# Patient Record
Sex: FEMALE | Race: WHITE | NOT HISPANIC OR LATINO | Employment: UNEMPLOYED | URBAN - METROPOLITAN AREA
[De-identification: names, ages, dates, MRNs, and addresses within clinical notes are randomized per-mention and may not be internally consistent; named-entity substitution may affect disease eponyms.]

---

## 2017-08-24 ENCOUNTER — HISTORICAL (OUTPATIENT)
Dept: INTERNAL MEDICINE | Facility: CLINIC | Age: 37
End: 2017-08-24

## 2017-08-24 LAB
ABS NEUT (OLG): 6.94 X10(3)/MCL (ref 2.1–9.2)
ALBUMIN SERPL-MCNC: 3.4 GM/DL (ref 3.4–5)
ALBUMIN/GLOB SERPL: 1 RATIO (ref 1–2)
ALP SERPL-CCNC: 89 UNIT/L (ref 45–117)
ALT SERPL-CCNC: 25 UNIT/L (ref 12–78)
AST SERPL-CCNC: 13 UNIT/L (ref 15–37)
BASOPHILS # BLD AUTO: 0.03 X10(3)/MCL
BASOPHILS NFR BLD AUTO: 0 % (ref 0–1)
BILIRUB SERPL-MCNC: 0.3 MG/DL (ref 0.2–1)
BILIRUBIN DIRECT+TOT PNL SERPL-MCNC: <0.1 MG/DL
BILIRUBIN DIRECT+TOT PNL SERPL-MCNC: >0.2 MG/DL
BUN SERPL-MCNC: 11 MG/DL (ref 7–18)
CALCIUM SERPL-MCNC: 9.4 MG/DL (ref 8.5–10.1)
CHLORIDE SERPL-SCNC: 103 MMOL/L (ref 98–107)
CHOLEST SERPL-MCNC: 172 MG/DL
CHOLEST/HDLC SERPL: 3.4 {RATIO} (ref 0–4.4)
CO2 SERPL-SCNC: 30 MMOL/L (ref 21–32)
CREAT SERPL-MCNC: 0.8 MG/DL (ref 0.6–1.3)
EOSINOPHIL # BLD AUTO: 0.83 X10(3)/MCL
EOSINOPHIL NFR BLD AUTO: 8 % (ref 0–5)
ERYTHROCYTE [DISTWIDTH] IN BLOOD BY AUTOMATED COUNT: 14.6 % (ref 11.5–14.5)
EST. AVERAGE GLUCOSE BLD GHB EST-MCNC: 111 MG/DL
GLOBULIN SER-MCNC: 4.1 GM/ML (ref 2.3–3.5)
GLUCOSE SERPL-MCNC: 102 MG/DL (ref 74–106)
HBA1C MFR BLD: 5.5 % (ref 4.2–6.3)
HCT VFR BLD AUTO: 40 % (ref 35–46)
HDLC SERPL-MCNC: 50 MG/DL
HGB BLD-MCNC: 12.8 GM/DL (ref 12–16)
IMM GRANULOCYTES # BLD AUTO: 0.02 10*3/UL
IMM GRANULOCYTES NFR BLD AUTO: 0 %
LDLC SERPL CALC-MCNC: 107 MG/DL (ref 0–130)
LYMPHOCYTES # BLD AUTO: 2.51 X10(3)/MCL
LYMPHOCYTES NFR BLD AUTO: 24 % (ref 15–40)
MCH RBC QN AUTO: 23.9 PG (ref 26–34)
MCHC RBC AUTO-ENTMCNC: 32 GM/DL (ref 31–37)
MCV RBC AUTO: 74.8 FL (ref 80–100)
MONOCYTES # BLD AUTO: 0.35 X10(3)/MCL
MONOCYTES NFR BLD AUTO: 3 % (ref 4–12)
NEUTROPHILS # BLD AUTO: 6.94 X10(3)/MCL
NEUTROPHILS NFR BLD AUTO: 65 X10(3)/MCL
PLATELET # BLD AUTO: 303 X10(3)/MCL (ref 130–400)
PMV BLD AUTO: 8.9 FL (ref 7.4–10.4)
POTASSIUM SERPL-SCNC: 3.7 MMOL/L (ref 3.5–5.1)
PROT SERPL-MCNC: 7.5 GM/DL (ref 6.4–8.2)
RBC # BLD AUTO: 5.35 X10(6)/MCL (ref 4–5.2)
SODIUM SERPL-SCNC: 142 MMOL/L (ref 136–145)
T4 FREE SERPL-MCNC: 1.25 NG/DL (ref 0.76–1.46)
TRIGL SERPL-MCNC: 74 MG/DL
TSH SERPL-ACNC: 6.06 MIU/L (ref 0.36–3.74)
VLDLC SERPL CALC-MCNC: 15 MG/DL
WBC # SPEC AUTO: 10.7 X10(3)/MCL (ref 4.5–11)

## 2017-11-17 ENCOUNTER — HISTORICAL (OUTPATIENT)
Dept: LAB | Facility: HOSPITAL | Age: 37
End: 2017-11-17

## 2017-11-17 LAB
ABS NEUT (OLG): 10.22
ALBUMIN SERPL-MCNC: 3.7 GM/DL (ref 3.4–5)
ALBUMIN/GLOB SERPL: 0.9 RATIO (ref 1.1–2)
ALP SERPL-CCNC: 88 UNIT/L (ref 46–116)
ALT SERPL-CCNC: 35 UNIT/L (ref 12–78)
AST SERPL-CCNC: 23 UNIT/L (ref 10–37)
BASOPHILS # BLD AUTO: 0.05 X10(3)/MCL
BASOPHILS NFR BLD AUTO: 0.3 %
BILIRUB SERPL-MCNC: 0.4 MG/DL (ref 0.2–1)
BILIRUBIN DIRECT+TOT PNL SERPL-MCNC: 0.1 MG/DL (ref 0–0.2)
BILIRUBIN DIRECT+TOT PNL SERPL-MCNC: 0.28 MG/DL
BUN SERPL-MCNC: 10 MG/DL (ref 7–18)
CALCIUM SERPL-MCNC: 9.3 MG/DL (ref 8.5–10.1)
CHLORIDE SERPL-SCNC: 102 MMOL/L (ref 98–107)
CHOLEST SERPL-MCNC: 189 MG/DL (ref 50–200)
CHOLEST/HDLC SERPL: 3 {RATIO} (ref 0–5)
CO2 SERPL-SCNC: 30.9 MMOL/L (ref 21–32)
CREAT SERPL-MCNC: 0.76 MG/DL (ref 0.55–1.02)
EOSINOPHIL # BLD AUTO: 1.58 X10(3)/MCL
EOSINOPHIL NFR BLD AUTO: 10.3 %
ERYTHROCYTE [DISTWIDTH] IN BLOOD BY AUTOMATED COUNT: 16 %
EST. AVERAGE GLUCOSE BLD GHB EST-MCNC: 111 MG/DL
GLOBULIN SER-MCNC: 4.1 GM/DL (ref 2.4–3.5)
GLUCOSE SERPL-MCNC: 99 MG/DL (ref 74–106)
HBA1C MFR BLD: 5.5 % (ref 4.5–6.2)
HCT VFR BLD AUTO: 43.8 % (ref 34–46)
HDLC SERPL-MCNC: 59 MG/DL (ref 35–60)
HGB BLD-MCNC: 14.1 GM/DL (ref 11.3–15.4)
IMM GRANULOCYTES # BLD AUTO: 0.04 10*3/UL (ref 0–0.1)
IMM GRANULOCYTES NFR BLD AUTO: 0.3 % (ref 0–1)
LDLC SERPL CALC-MCNC: 111 MG/DL (ref 50–140)
LYMPHOCYTES # BLD AUTO: 2.9 X10(3)/MCL
LYMPHOCYTES NFR BLD AUTO: 18.9 %
MCH RBC QN AUTO: 24.6 PG (ref 27–33)
MCHC RBC AUTO-ENTMCNC: 32.2 GM/DL (ref 32–35)
MCV RBC AUTO: 76.3 FL (ref 81–97)
MONOCYTES # BLD AUTO: 0.52 X10(3)/MCL
MONOCYTES NFR BLD AUTO: 3.4 %
NEUTROPHILS # BLD AUTO: 10.22 X10(3)/MCL
NEUTROPHILS NFR BLD AUTO: 66.8 %
PLATELET # BLD AUTO: 289 X10(3)/MCL (ref 151–368)
PMV BLD AUTO: 9 FL
POTASSIUM SERPL-SCNC: 4.4 MMOL/L (ref 3.5–5.1)
PROT SERPL-MCNC: 7.8 GM/DL (ref 6.4–8.2)
RBC # BLD AUTO: 5.74 X10(6)/MCL (ref 3.9–5)
SODIUM SERPL-SCNC: 140 MMOL/L (ref 136–145)
TRIGL SERPL-MCNC: 95 MG/DL (ref 30–150)
TSH SERPL-ACNC: 2.96 MIU/ML (ref 0.35–3.75)
VLDLC SERPL CALC-MCNC: 19 MG/DL
WBC # SPEC AUTO: 15.31 X10(3)/MCL (ref 3.4–9.2)

## 2017-12-27 ENCOUNTER — HISTORICAL (OUTPATIENT)
Dept: RADIOLOGY | Facility: HOSPITAL | Age: 37
End: 2017-12-27

## 2018-02-23 ENCOUNTER — HISTORICAL (OUTPATIENT)
Dept: ADMINISTRATIVE | Facility: HOSPITAL | Age: 38
End: 2018-02-23

## 2018-02-23 LAB
BUN SERPL-MCNC: 8 MG/DL (ref 7–18)
CALCIUM SERPL-MCNC: 9.5 MG/DL (ref 8.5–10.1)
CHLORIDE SERPL-SCNC: 103 MMOL/L (ref 98–107)
CO2 SERPL-SCNC: 27 MMOL/L (ref 21–32)
CREAT SERPL-MCNC: 0.7 MG/DL (ref 0.6–1.3)
ERYTHROCYTE [DISTWIDTH] IN BLOOD BY AUTOMATED COUNT: 14.6 % (ref 11.5–14.5)
GLUCOSE SERPL-MCNC: 156 MG/DL (ref 74–106)
HCT VFR BLD AUTO: 40.3 % (ref 35–46)
HGB BLD-MCNC: 13 GM/DL (ref 12–16)
MCH RBC QN AUTO: 25.3 PG (ref 26–34)
MCHC RBC AUTO-ENTMCNC: 32.3 GM/DL (ref 31–37)
MCV RBC AUTO: 78.4 FL (ref 80–100)
PLATELET # BLD AUTO: 334 X10(3)/MCL (ref 130–400)
PMV BLD AUTO: 9.6 FL (ref 7.4–10.4)
POTASSIUM SERPL-SCNC: 3.6 MMOL/L (ref 3.5–5.1)
RBC # BLD AUTO: 5.14 X10(6)/MCL (ref 4–5.2)
SODIUM SERPL-SCNC: 138 MMOL/L (ref 136–145)
WBC # SPEC AUTO: 16 X10(3)/MCL (ref 4.5–11)

## 2018-02-26 ENCOUNTER — HISTORICAL (OUTPATIENT)
Dept: SURGERY | Facility: HOSPITAL | Age: 38
End: 2018-02-26

## 2018-02-26 LAB
B-HCG SERPL QL: NEGATIVE
POTASSIUM SERPL-SCNC: 3.7 MMOL/L (ref 3.5–5.1)

## 2019-06-05 ENCOUNTER — HISTORICAL (OUTPATIENT)
Dept: LAB | Facility: HOSPITAL | Age: 39
End: 2019-06-05

## 2019-06-05 LAB
ABS NEUT (OLG): 7.25
ALBUMIN SERPL-MCNC: 3.2 GM/DL (ref 3.4–5)
ALBUMIN/GLOB SERPL: 0.8 RATIO (ref 1.1–2)
ALP SERPL-CCNC: 94 UNIT/L (ref 46–116)
ALT SERPL-CCNC: 28 UNIT/L (ref 12–78)
AST SERPL-CCNC: 14 UNIT/L (ref 10–37)
BASOPHILS # BLD AUTO: 0.03 X10(3)/MCL
BASOPHILS NFR BLD AUTO: 0.3 %
BILIRUB SERPL-MCNC: 0.2 MG/DL (ref 0.2–1)
BILIRUBIN DIRECT+TOT PNL SERPL-MCNC: 0.07 MG/DL (ref 0–0.2)
BILIRUBIN DIRECT+TOT PNL SERPL-MCNC: 0.13 MG/DL
BUN SERPL-MCNC: 9 MG/DL (ref 7–18)
CALCIUM SERPL-MCNC: 9.3 MG/DL (ref 8.5–10.1)
CHLORIDE SERPL-SCNC: 103 MMOL/L (ref 98–107)
CHOLEST SERPL-MCNC: 182 MG/DL (ref 50–200)
CHOLEST/HDLC SERPL: 4 {RATIO} (ref 0–5)
CO2 SERPL-SCNC: 32.7 MMOL/L (ref 21–32)
CREAT SERPL-MCNC: 0.86 MG/DL (ref 0.55–1.02)
EOSINOPHIL # BLD AUTO: 0.54 X10(3)/MCL
EOSINOPHIL NFR BLD AUTO: 5.1 %
ERYTHROCYTE [DISTWIDTH] IN BLOOD BY AUTOMATED COUNT: 15 %
EST. AVERAGE GLUCOSE BLD GHB EST-MCNC: 134 MG/DL
GLOBULIN SER-MCNC: 4.1 GM/DL (ref 2.4–3.5)
GLUCOSE SERPL-MCNC: 118 MG/DL (ref 74–106)
HBA1C MFR BLD: 6.3 % (ref 4.5–6.2)
HCT VFR BLD AUTO: 41.3 % (ref 34–46)
HDLC SERPL-MCNC: 43 MG/DL (ref 35–60)
HGB BLD-MCNC: 12.7 GM/DL (ref 11.3–15.4)
IMM GRANULOCYTES # BLD AUTO: 0.03 10*3/UL (ref 0–0.1)
IMM GRANULOCYTES NFR BLD AUTO: 0.3 % (ref 0–1)
LDLC SERPL CALC-MCNC: 123 MG/DL (ref 50–140)
LYMPHOCYTES # BLD AUTO: 2.33 X10(3)/MCL
LYMPHOCYTES NFR BLD AUTO: 21.8 %
MCH RBC QN AUTO: 23.9 PG (ref 27–33)
MCHC RBC AUTO-ENTMCNC: 30.8 GM/DL (ref 32–35)
MCV RBC AUTO: 77.8 FL (ref 81–97)
MONOCYTES # BLD AUTO: 0.51 X10(3)/MCL
MONOCYTES NFR BLD AUTO: 4.8 %
NEUTROPHILS # BLD AUTO: 7.25 X10(3)/MCL
NEUTROPHILS NFR BLD AUTO: 67.7 %
PLATELET # BLD AUTO: 312 X10(3)/MCL (ref 151–368)
PMV BLD AUTO: 8 FL
POTASSIUM SERPL-SCNC: 3.8 MMOL/L (ref 3.5–5.1)
PROT SERPL-MCNC: 7.3 GM/DL (ref 6.4–8.2)
RBC # BLD AUTO: 5.31 X10(6)/MCL (ref 3.9–5)
SODIUM SERPL-SCNC: 141 MMOL/L (ref 136–145)
TRIGL SERPL-MCNC: 81 MG/DL (ref 30–150)
TSH SERPL-ACNC: 2.85 MIU/ML (ref 0.35–3.75)
VLDLC SERPL CALC-MCNC: 16 MG/DL
WBC # SPEC AUTO: 10.69 X10(3)/MCL (ref 3.4–9.2)

## 2019-12-05 ENCOUNTER — HISTORICAL (OUTPATIENT)
Dept: LAB | Facility: HOSPITAL | Age: 39
End: 2019-12-05

## 2019-12-05 LAB
ABS NEUT (OLG): 9.21
ALBUMIN SERPL-MCNC: 3.5 GM/DL (ref 3.5–5.2)
ALBUMIN/GLOB SERPL: 0.9 RATIO (ref 1.1–2)
ALP SERPL-CCNC: 74 UNIT/L (ref 40–150)
ALT SERPL-CCNC: 31 UNIT/L (ref 0–55)
AST SERPL-CCNC: 28 UNIT/L (ref 5–34)
BASOPHILS # BLD AUTO: 0.02 X10(3)/MCL
BASOPHILS NFR BLD AUTO: 0.2 %
BILIRUB SERPL-MCNC: 0.3 MG/DL
BILIRUBIN DIRECT+TOT PNL SERPL-MCNC: 0.1 MG/DL (ref 0–0.5)
BILIRUBIN DIRECT+TOT PNL SERPL-MCNC: 0.2 MG/DL
BUN SERPL-MCNC: 7 MG/DL (ref 7–18.7)
CALCIUM SERPL-MCNC: 9.5 MG/DL (ref 8.4–10.2)
CHLORIDE SERPL-SCNC: 106 MMOL/L (ref 98–107)
CHOLEST SERPL-MCNC: 160 MG/DL
CHOLEST/HDLC SERPL: 5 {RATIO} (ref 0–5)
CO2 SERPL-SCNC: 27 MEQ/L (ref 22–29)
CREAT SERPL-MCNC: 0.69 MG/DL (ref 0.55–1.02)
EOSINOPHIL # BLD AUTO: 0.37 X10(3)/MCL
EOSINOPHIL NFR BLD AUTO: 2.9 %
ERYTHROCYTE [DISTWIDTH] IN BLOOD BY AUTOMATED COUNT: 16 %
EST. AVERAGE GLUCOSE BLD GHB EST-MCNC: 117 MG/DL
GLOBULIN SER-MCNC: 3.7 GM/DL (ref 2.4–3.5)
GLUCOSE SERPL-MCNC: 74 MG/DL (ref 74–100)
HBA1C MFR BLD: 5.7 % (ref 4–6)
HCT VFR BLD AUTO: 40.4 % (ref 34–46)
HDLC SERPL-MCNC: 35 MG/DL
HGB BLD-MCNC: 12.8 GM/DL (ref 11.3–15.4)
IMM GRANULOCYTES # BLD AUTO: 0.03 10*3/UL (ref 0–0.1)
IMM GRANULOCYTES NFR BLD AUTO: 0.2 % (ref 0–1)
LDLC SERPL CALC-MCNC: 87 MG/DL (ref 50–140)
LYMPHOCYTES # BLD AUTO: 2.36 X10(3)/MCL
LYMPHOCYTES NFR BLD AUTO: 18.6 %
MCH RBC QN AUTO: 25.1 PG (ref 27–33)
MCHC RBC AUTO-ENTMCNC: 31.7 GM/DL (ref 32–35)
MCV RBC AUTO: 79.2 FL (ref 81–97)
MONOCYTES # BLD AUTO: 0.68 X10(3)/MCL
MONOCYTES NFR BLD AUTO: 5.4 %
NEUTROPHILS # BLD AUTO: 9.21 X10(3)/MCL
NEUTROPHILS NFR BLD AUTO: 72.7 %
PLATELET # BLD AUTO: 304 X10(3)/MCL (ref 140–450)
PMV BLD AUTO: 9 FL
POTASSIUM SERPL-SCNC: 3.5 MMOL/L (ref 3.5–5.1)
PROT SERPL-MCNC: 7.2 GM/DL (ref 6.4–8.3)
RBC # BLD AUTO: 5.1 X10(6)/MCL (ref 3.9–5)
SODIUM SERPL-SCNC: 142 MMOL/L (ref 136–145)
TRIGL SERPL-MCNC: 189 MG/DL (ref 37–140)
TSH SERPL-ACNC: 1.72 UIU/ML (ref 0.35–4.94)
VLDLC SERPL CALC-MCNC: 38 MG/DL
WBC # SPEC AUTO: 12.67 X10(3)/MCL (ref 3.4–9.2)

## 2019-12-17 ENCOUNTER — HISTORICAL (OUTPATIENT)
Dept: RADIOLOGY | Facility: HOSPITAL | Age: 39
End: 2019-12-17

## 2019-12-23 ENCOUNTER — HISTORICAL (OUTPATIENT)
Dept: RESPIRATORY THERAPY | Facility: HOSPITAL | Age: 39
End: 2019-12-23

## 2020-04-30 ENCOUNTER — HISTORICAL (OUTPATIENT)
Dept: RADIOLOGY | Facility: HOSPITAL | Age: 40
End: 2020-04-30

## 2020-05-04 ENCOUNTER — HISTORICAL (OUTPATIENT)
Dept: RADIOLOGY | Facility: HOSPITAL | Age: 40
End: 2020-05-04

## 2020-09-08 ENCOUNTER — HISTORICAL (OUTPATIENT)
Dept: ADMINISTRATIVE | Facility: HOSPITAL | Age: 40
End: 2020-09-08

## 2020-09-08 LAB — TSH SERPL-ACNC: 2.37 MIU/L (ref 0.36–3.74)

## 2020-09-15 ENCOUNTER — HISTORICAL (OUTPATIENT)
Dept: RESPIRATORY THERAPY | Facility: HOSPITAL | Age: 40
End: 2020-09-15

## 2020-09-22 LAB
HUMAN PAPILLOMAVIRUS (HPV): NORMAL
PAP RECOMMENDATION EXT: NORMAL
PAP SMEAR: NORMAL

## 2020-11-10 ENCOUNTER — HISTORICAL (OUTPATIENT)
Dept: LAB | Facility: HOSPITAL | Age: 40
End: 2020-11-10

## 2020-11-10 LAB
ABS NEUT (OLG): 7.03
ALBUMIN SERPL-MCNC: 3.7 GM/DL (ref 3.5–5)
ALBUMIN/GLOB SERPL: 0.9 RATIO (ref 1.1–2)
ALP SERPL-CCNC: 87 UNIT/L (ref 40–150)
ALT SERPL-CCNC: 32 UNIT/L (ref 0–55)
AST SERPL-CCNC: 27 UNIT/L (ref 5–34)
BASOPHILS # BLD AUTO: 0.02 X10(3)/MCL
BASOPHILS NFR BLD AUTO: 0.2 %
BILIRUB SERPL-MCNC: 0.3 MG/DL
BILIRUBIN DIRECT+TOT PNL SERPL-MCNC: 0.1 MG/DL
BILIRUBIN DIRECT+TOT PNL SERPL-MCNC: 0.2 MG/DL (ref 0–0.5)
BUN SERPL-MCNC: 10 MG/DL (ref 7–18.7)
CALCIUM SERPL-MCNC: 9.6 MG/DL (ref 8.4–10.2)
CHLORIDE SERPL-SCNC: 106 MMOL/L (ref 98–107)
CHOLEST SERPL-MCNC: 164 MG/DL
CHOLEST/HDLC SERPL: 4 {RATIO} (ref 0–5)
CO2 SERPL-SCNC: 26 MEQ/L (ref 22–29)
CREAT SERPL-MCNC: 0.76 MG/DL (ref 0.55–1.02)
EOSINOPHIL # BLD AUTO: 0.3 X10(3)/MCL
EOSINOPHIL NFR BLD AUTO: 3 %
ERYTHROCYTE [DISTWIDTH] IN BLOOD BY AUTOMATED COUNT: 16 %
EST. AVERAGE GLUCOSE BLD GHB EST-MCNC: 114 MG/DL
GLOBULIN SER-MCNC: 4.1 GM/DL (ref 2.4–3.5)
GLUCOSE SERPL-MCNC: 110 MG/DL (ref 74–100)
HBA1C MFR BLD: 5.6 % (ref 4–6)
HCT VFR BLD AUTO: 38.1 % (ref 34–46)
HDLC SERPL-MCNC: 41 MG/DL (ref 35–60)
HGB BLD-MCNC: 11.8 GM/DL (ref 11.3–15.4)
IMM GRANULOCYTES # BLD AUTO: 0.03 10*3/UL (ref 0–0.1)
IMM GRANULOCYTES NFR BLD AUTO: 0.3 % (ref 0–1)
LDLC SERPL CALC-MCNC: 101 MG/DL (ref 50–140)
LYMPHOCYTES # BLD AUTO: 2 X10(3)/MCL
LYMPHOCYTES NFR BLD AUTO: 20.3 %
MCH RBC QN AUTO: 24.4 PG (ref 27–33)
MCHC RBC AUTO-ENTMCNC: 31 GM/DL (ref 32–35)
MCV RBC AUTO: 78.7 FL (ref 81–97)
MONOCYTES # BLD AUTO: 0.48 X10(3)/MCL
MONOCYTES NFR BLD AUTO: 4.9 %
NEUTROPHILS # BLD AUTO: 7.03 X10(3)/MCL
NEUTROPHILS NFR BLD AUTO: 71.3 %
PLATELET # BLD AUTO: 351 X10(3)/MCL (ref 140–450)
PMV BLD AUTO: 8 FL
POTASSIUM SERPL-SCNC: 4 MMOL/L (ref 3.5–5.1)
PROT SERPL-MCNC: 7.8 GM/DL (ref 6.4–8.3)
RBC # BLD AUTO: 4.84 X10(6)/MCL (ref 3.9–5)
SODIUM SERPL-SCNC: 142 MMOL/L (ref 136–145)
TRIGL SERPL-MCNC: 109 MG/DL (ref 37–140)
VLDLC SERPL CALC-MCNC: 22 MG/DL
WBC # SPEC AUTO: 9.86 X10(3)/MCL (ref 3.4–9.2)

## 2021-03-22 ENCOUNTER — HISTORICAL (OUTPATIENT)
Dept: LAB | Facility: HOSPITAL | Age: 41
End: 2021-03-22

## 2021-03-22 LAB
ABS NEUT (OLG): 10.21
ABS NEUT (OLG): 6.94
ALBUMIN SERPL-MCNC: 3.5 GM/DL (ref 3.5–5)
ALBUMIN/GLOB SERPL: 0.9 RATIO (ref 1.1–2)
ALP SERPL-CCNC: 86 UNIT/L (ref 40–150)
ALT SERPL-CCNC: 17 UNIT/L (ref 0–55)
AST SERPL-CCNC: 17 UNIT/L (ref 5–34)
BASOPHILS # BLD AUTO: 0.01 X10(3)/MCL
BASOPHILS # BLD AUTO: 0.02 X10(3)/MCL
BASOPHILS NFR BLD AUTO: 0.1 %
BASOPHILS NFR BLD AUTO: 0.2 %
BILIRUB SERPL-MCNC: 0.3 MG/DL
BILIRUBIN DIRECT+TOT PNL SERPL-MCNC: 0.1 MG/DL (ref 0–0.5)
BILIRUBIN DIRECT+TOT PNL SERPL-MCNC: 0.2 MG/DL
BUN SERPL-MCNC: 12 MG/DL (ref 7–18.7)
CALCIUM SERPL-MCNC: 9.5 MG/DL (ref 8.4–10.2)
CHLORIDE SERPL-SCNC: 104 MMOL/L (ref 98–107)
CHOLEST SERPL-MCNC: 165 MG/DL
CHOLEST/HDLC SERPL: 4 {RATIO} (ref 0–5)
CO2 SERPL-SCNC: 30 MEQ/L (ref 22–29)
CREAT SERPL-MCNC: 0.67 MG/DL (ref 0.55–1.02)
EOSINOPHIL # BLD AUTO: 0.22 X10(3)/MCL
EOSINOPHIL # BLD AUTO: 0.23 X10(3)/MCL
EOSINOPHIL NFR BLD AUTO: 1.6 %
EOSINOPHIL NFR BLD AUTO: 2.3 %
ERYTHROCYTE [DISTWIDTH] IN BLOOD BY AUTOMATED COUNT: 17 %
ERYTHROCYTE [DISTWIDTH] IN BLOOD BY AUTOMATED COUNT: 18 %
EST. AVERAGE GLUCOSE BLD GHB EST-MCNC: 114 MG/DL
FERRITIN SERPL-MCNC: 73.55 NG/ML (ref 4.63–204)
GLOBULIN SER-MCNC: 4.1 GM/DL (ref 2.4–3.5)
GLUCOSE SERPL-MCNC: 117 MG/DL (ref 74–100)
HBA1C MFR BLD: 5.6 % (ref 4–6)
HCT VFR BLD AUTO: 37.9 % (ref 34–46)
HCT VFR BLD AUTO: 40.2 % (ref 34–46)
HDLC SERPL-MCNC: 47 MG/DL (ref 35–60)
HGB BLD-MCNC: 11.4 GM/DL (ref 11.3–15.4)
HGB BLD-MCNC: 12.4 GM/DL (ref 11.3–15.4)
IMM GRANULOCYTES # BLD AUTO: 0.02 10*3/UL (ref 0–0.1)
IMM GRANULOCYTES # BLD AUTO: 0.03 10*3/UL (ref 0–0.1)
IMM GRANULOCYTES NFR BLD AUTO: 0.1 % (ref 0–1)
IMM GRANULOCYTES NFR BLD AUTO: 0.3 % (ref 0–1)
IRON SATN MFR SERPL: 8 % (ref 20–50)
IRON SERPL-MCNC: 22 UG/DL (ref 50–170)
LDLC SERPL CALC-MCNC: 98 MG/DL (ref 50–140)
LYMPHOCYTES # BLD AUTO: 2.2 X10(3)/MCL
LYMPHOCYTES # BLD AUTO: 2.46 X10(3)/MCL
LYMPHOCYTES NFR BLD AUTO: 18.3 %
LYMPHOCYTES NFR BLD AUTO: 22.3 %
MCH RBC QN AUTO: 23.1 PG (ref 27–33)
MCH RBC QN AUTO: 23.4 PG (ref 27–33)
MCHC RBC AUTO-ENTMCNC: 30.1 GM/DL (ref 32–35)
MCHC RBC AUTO-ENTMCNC: 30.8 GM/DL (ref 32–35)
MCV RBC AUTO: 76 FL (ref 81–97)
MCV RBC AUTO: 76.9 FL (ref 81–97)
MONOCYTES # BLD AUTO: 0.43 X10(3)/MCL
MONOCYTES # BLD AUTO: 0.52 X10(3)/MCL
MONOCYTES NFR BLD AUTO: 3.9 %
MONOCYTES NFR BLD AUTO: 4.4 %
NEUTROPHILS # BLD AUTO: 10.21 X10(3)/MCL
NEUTROPHILS # BLD AUTO: 6.94 X10(3)/MCL
NEUTROPHILS NFR BLD AUTO: 70.5 %
NEUTROPHILS NFR BLD AUTO: 76 %
PLATELET # BLD AUTO: 359 X10(3)/MCL (ref 140–450)
PLATELET # BLD AUTO: 360 X10(3)/MCL (ref 140–450)
PMV BLD AUTO: 8 FL
PMV BLD AUTO: 9 FL
POTASSIUM SERPL-SCNC: 4.3 MMOL/L (ref 3.5–5.1)
PROT SERPL-MCNC: 7.6 GM/DL (ref 6.4–8.3)
RBC # BLD AUTO: 4.93 X10(6)/MCL (ref 3.9–5)
RBC # BLD AUTO: 5.29 X10(6)/MCL (ref 3.9–5)
SODIUM SERPL-SCNC: 143 MMOL/L (ref 136–145)
TIBC SERPL-MCNC: 271 UG/DL (ref 70–310)
TIBC SERPL-MCNC: 293 UG/DL (ref 250–450)
TRANSFERRIN SERPL-MCNC: 247 MG/DL (ref 180–382)
TRIGL SERPL-MCNC: 102 MG/DL (ref 37–140)
VLDLC SERPL CALC-MCNC: 20 MG/DL
WBC # SPEC AUTO: 13.44 X10(3)/MCL (ref 3.4–9.2)
WBC # SPEC AUTO: 9.85 X10(3)/MCL (ref 3.4–9.2)

## 2021-07-15 LAB — CRC RECOMMENDATION EXT: NORMAL

## 2021-09-30 ENCOUNTER — HISTORICAL (OUTPATIENT)
Dept: LAB | Facility: HOSPITAL | Age: 41
End: 2021-09-30

## 2021-09-30 LAB
ABS NEUT (OLG): 9.49
ALBUMIN SERPL-MCNC: 3.5 GM/DL (ref 3.5–5)
ALBUMIN/GLOB SERPL: 0.9 RATIO (ref 1.1–2)
ALP SERPL-CCNC: 81 UNIT/L (ref 40–150)
ALT SERPL-CCNC: 18 UNIT/L (ref 0–55)
AST SERPL-CCNC: 10 UNIT/L (ref 5–34)
BASOPHILS # BLD AUTO: 0.02 X10(3)/MCL
BASOPHILS NFR BLD AUTO: 0.2 %
BILIRUB SERPL-MCNC: 0.2 MG/DL
BILIRUBIN DIRECT+TOT PNL SERPL-MCNC: 0.1 MG/DL
BILIRUBIN DIRECT+TOT PNL SERPL-MCNC: 0.1 MG/DL (ref 0–0.5)
BUN SERPL-MCNC: 11 MG/DL (ref 7–18.7)
CALCIUM SERPL-MCNC: 9.6 MG/DL (ref 8.4–10.2)
CHLORIDE SERPL-SCNC: 103 MMOL/L (ref 98–107)
CHOLEST SERPL-MCNC: 156 MG/DL
CHOLEST/HDLC SERPL: 3 {RATIO} (ref 0–5)
CO2 SERPL-SCNC: 28 MEQ/L (ref 22–29)
CREAT SERPL-MCNC: 0.72 MG/DL (ref 0.55–1.02)
EOSINOPHIL # BLD AUTO: 0.04 X10(3)/MCL
EOSINOPHIL NFR BLD AUTO: 0.3 %
ERYTHROCYTE [DISTWIDTH] IN BLOOD BY AUTOMATED COUNT: 18 %
EST. AVERAGE GLUCOSE BLD GHB EST-MCNC: 123 MG/DL
FERRITIN SERPL-MCNC: 91.96 NG/ML (ref 4.63–204)
GLOBULIN SER-MCNC: 4 GM/DL (ref 2.4–3.5)
GLUCOSE SERPL-MCNC: 177 MG/DL (ref 74–100)
HBA1C MFR BLD: 5.9 % (ref 4–6)
HCT VFR BLD AUTO: 40.6 % (ref 34–46)
HDLC SERPL-MCNC: 51 MG/DL (ref 35–60)
HGB BLD-MCNC: 12.5 GM/DL (ref 11.3–15.4)
IMM GRANULOCYTES # BLD AUTO: 0.07 10*3/UL (ref 0–0.1)
IMM GRANULOCYTES NFR BLD AUTO: 0.6 % (ref 0–1)
IRON SATN MFR SERPL: 9 % (ref 20–50)
IRON SERPL-MCNC: 23 UG/DL (ref 50–170)
LDLC SERPL CALC-MCNC: 77 MG/DL (ref 50–140)
LYMPHOCYTES # BLD AUTO: 1.57 X10(3)/MCL
LYMPHOCYTES NFR BLD AUTO: 13.5 %
MCH RBC QN AUTO: 24.6 PG (ref 27–33)
MCHC RBC AUTO-ENTMCNC: 30.8 GM/DL (ref 32–35)
MCV RBC AUTO: 79.8 FL (ref 81–97)
MONOCYTES # BLD AUTO: 0.47 X10(3)/MCL
MONOCYTES NFR BLD AUTO: 4 %
NEUTROPHILS # BLD AUTO: 9.49 X10(3)/MCL
NEUTROPHILS NFR BLD AUTO: 81.4 %
PLATELET # BLD AUTO: 348 X10(3)/MCL (ref 140–450)
PMV BLD AUTO: 8 FL
POTASSIUM SERPL-SCNC: 3.9 MMOL/L (ref 3.5–5.1)
PROT SERPL-MCNC: 7.5 GM/DL (ref 6.4–8.3)
RBC # BLD AUTO: 5.09 X10(6)/MCL (ref 3.9–5)
SODIUM SERPL-SCNC: 140 MMOL/L (ref 136–145)
TIBC SERPL-MCNC: 237 UG/DL (ref 70–310)
TIBC SERPL-MCNC: 260 UG/DL (ref 250–450)
TRANSFERRIN SERPL-MCNC: 234 MG/DL (ref 180–382)
TRIGL SERPL-MCNC: 141 MG/DL (ref 37–140)
VLDLC SERPL CALC-MCNC: 28 MG/DL
WBC # SPEC AUTO: 11.66 X10(3)/MCL (ref 3.4–9.2)

## 2021-10-11 ENCOUNTER — HISTORICAL (OUTPATIENT)
Dept: RADIOLOGY | Facility: HOSPITAL | Age: 41
End: 2021-10-11

## 2022-01-06 ENCOUNTER — HISTORICAL (OUTPATIENT)
Dept: LAB | Facility: HOSPITAL | Age: 42
End: 2022-01-06

## 2022-01-06 LAB
ABS NEUT (OLG): 6.99
BASOPHILS # BLD AUTO: 0.02 X10(3)/MCL
BASOPHILS NFR BLD AUTO: 0.2 %
EOSINOPHIL # BLD AUTO: 0.34 X10(3)/MCL
EOSINOPHIL NFR BLD AUTO: 3.4 %
ERYTHROCYTE [DISTWIDTH] IN BLOOD BY AUTOMATED COUNT: 17 %
FERRITIN SERPL-MCNC: 140.98 NG/ML (ref 4.63–204)
HCT VFR BLD AUTO: 39.9 % (ref 34–46)
HGB BLD-MCNC: 12.3 GM/DL (ref 11.3–15.4)
IMM GRANULOCYTES # BLD AUTO: 0.04 10*3/UL (ref 0–0.1)
IMM GRANULOCYTES NFR BLD AUTO: 0.4 % (ref 0–1)
IRON SATN MFR SERPL: 17 % (ref 20–50)
IRON SERPL-MCNC: 40 UG/DL (ref 50–170)
LYMPHOCYTES # BLD AUTO: 1.96 X10(3)/MCL
LYMPHOCYTES NFR BLD AUTO: 19.8 %
MCH RBC QN AUTO: 24.8 PG (ref 27–33)
MCHC RBC AUTO-ENTMCNC: 30.8 GM/DL (ref 32–35)
MCV RBC AUTO: 80.6 FL (ref 81–97)
MONOCYTES # BLD AUTO: 0.55 X10(3)/MCL
MONOCYTES NFR BLD AUTO: 5.6 %
NEUTROPHILS # BLD AUTO: 6.99 X10(3)/MCL
NEUTROPHILS NFR BLD AUTO: 70.6 %
PLATELET # BLD AUTO: 337 X10(3)/MCL (ref 140–450)
PMV BLD AUTO: 9 FL
RBC # BLD AUTO: 4.95 X10(6)/MCL (ref 3.9–5)
TIBC SERPL-MCNC: 195 UG/DL (ref 70–310)
TIBC SERPL-MCNC: 235 UG/DL (ref 250–450)
TRANSFERRIN SERPL-MCNC: 213 MG/DL (ref 180–382)
WBC # SPEC AUTO: 9.9 X10(3)/MCL (ref 3.4–9.2)

## 2022-04-04 ENCOUNTER — HISTORICAL (OUTPATIENT)
Dept: LAB | Facility: HOSPITAL | Age: 42
End: 2022-04-04

## 2022-04-04 LAB
ABS NEUT (OLG): 7.77
BASOPHILS # BLD AUTO: 0.01 10*3/UL
BASOPHILS NFR BLD AUTO: 0.1 %
BUN SERPL-MCNC: 7 MG/DL (ref 7–18.7)
CALCIUM SERPL-MCNC: 9.3 MG/DL (ref 8.7–10.5)
CHLORIDE SERPL-SCNC: 104 MMOL/L (ref 98–107)
CO2 SERPL-SCNC: 29 MMOL/L (ref 22–29)
CREAT SERPL-MCNC: 0.7 MG/DL (ref 0.55–1.02)
CREAT/UREA NIT SERPL: 10
EOSINOPHIL # BLD AUTO: 0.23 10*3/UL
EOSINOPHIL NFR BLD AUTO: 2.2 %
ERYTHROCYTE [DISTWIDTH] IN BLOOD BY AUTOMATED COUNT: 16 %
EST. AVERAGE GLUCOSE BLD GHB EST-MCNC: 131 MG/DL
GLUCOSE SERPL-MCNC: 101 MG/DL (ref 74–100)
HBA1C MFR BLD: 6.2 % (ref 4–6)
HCT VFR BLD AUTO: 38.2 % (ref 34–46)
HEMOLYSIS INTERF INDEX SERPL-ACNC: 2
HGB BLD-MCNC: 12.2 G/DL (ref 11.3–15.4)
ICTERIC INTERF INDEX SERPL-ACNC: 0
IMM GRANULOCYTES # BLD AUTO: 0.02 10*3/UL (ref 0–0.1)
IMM GRANULOCYTES NFR BLD AUTO: 0.2 % (ref 0–1)
IRON SATN MFR SERPL: 16 % (ref 20–50)
IRON SERPL-MCNC: 36 UG/DL (ref 50–170)
LIPEMIC INTERF INDEX SERPL-ACNC: -3
LYMPHOCYTES # BLD AUTO: 1.79 10*3/UL
LYMPHOCYTES NFR BLD AUTO: 17.2 %
MANUAL DIFF? (OHS): NO
MCH RBC QN AUTO: 26.1 PG (ref 27–33)
MCHC RBC AUTO-ENTMCNC: 31.9 G/DL (ref 32–35)
MCV RBC AUTO: 81.8 FL (ref 81–97)
MONOCYTES # BLD AUTO: 0.56 10*3/UL
MONOCYTES NFR BLD AUTO: 5.4 %
NEUTROPHILS # BLD AUTO: 7.77 10*3/UL
NEUTROPHILS NFR BLD AUTO: 74.9 %
PLATELET # BLD AUTO: 274 10*3/UL (ref 140–450)
PMV BLD AUTO: 9 FL
POTASSIUM SERPL-SCNC: 4.2 MMOL/L (ref 3.5–5.1)
RBC # BLD AUTO: 4.67 10*6/UL (ref 3.9–5)
SODIUM SERPL-SCNC: 143 MMOL/L (ref 136–145)
TIBC SERPL-MCNC: 189 UG/DL (ref 70–310)
TIBC SERPL-MCNC: 225 UG/DL (ref 250–450)
TRANSFERRIN SERPL-MCNC: 208 MG/DL (ref 180–382)
WBC # SPEC AUTO: 10.38 10*3/UL (ref 3.4–9.2)

## 2022-04-11 ENCOUNTER — HISTORICAL (OUTPATIENT)
Dept: ADMINISTRATIVE | Facility: HOSPITAL | Age: 42
End: 2022-04-11
Payer: MEDICAID

## 2022-04-25 DIAGNOSIS — D72.829 LEUKOCYTOSIS, UNSPECIFIED TYPE: Primary | ICD-10-CM

## 2022-04-27 VITALS
WEIGHT: 270.06 LBS | SYSTOLIC BLOOD PRESSURE: 106 MMHG | DIASTOLIC BLOOD PRESSURE: 67 MMHG | OXYGEN SATURATION: 98 % | HEIGHT: 60 IN | BODY MASS INDEX: 53.02 KG/M2

## 2022-04-29 NOTE — OP NOTE
DATE OF SURGERY:    02/26/2018    ATTENDING PHYSICIAN:  Kenyatta Workman MD    PREOPERATIVE DIAGNOSES:    1. Chronic ethmoidal sinusitis.  2. Chronic maxillary sinusitis.  3. Chronic frontal sinusitis.  4. Nasal polyposis.  5. Septal deviation.  6. Bilateral inferior turbinate hypertrophy.    PROCEDURES PERFORMED:    1. Septoplasty to include submucous resection of the bony cartilaginous septum.  2. Bilateral maxillary antrostomy with removal of contents.  3. Bilateral total ethmoidectomy.  4. Bilateral sphenoidotomy with removal of contents.  5. Bilateral frontal sinusotomy with placement of drug eluting stents.  6. Bilateral inferior turbinoplasty.    RESIDENT SURGEON: Trip Anthony MD    ASSISTANT SURGEON:  Cam Antoine MD    ANESTHESIA:  General endotracheal anesthesia.    ESTIMATED BLOOD LOSS:  200 cc    COMPLICATIONS:  None.    SPECIMENS:    1. Right nasal contents.  2. Left nasal contents.    FINDINGS:    1. There was diffuse nasal polyposis involving the right and left nasal cavities and extension up into the middle meatus.  2. Polyps were sent for routine histopathology, but there was no obvious evidence of other abnormal pathology.  3. There was a rightward septal deviation obstructing the right middle meatal access, and an endoscopic septoplasty was achieved.  4. Drug-eluting stents were placed in the bilateral frontal sinus outflow tracts.    INDICATION FOR PROCEDURE:  The patient is a 37-year-old female with a history of chronic sinus issues and a CT scan demonstrating diffuse nasal polyposis.  Of note, she has hypersensitivity reactions and has a known history of asthma which is poorly controlled.  Given these finding on her CT scan and endoscopic examination in the clinic, we discussed the options for management.  She has failed multiple medical management trials and has diffuse nasal polyps.  She is consented for operative intervention today.  All risks, benefits, and alternatives were  reviewed in great detail prior to proceeding in the holding area, and she elected to proceed.    PROCEDURE IN DETAIL:  After appropriate witnessed informed consent was obtained, the patient was taken down to the operating room and laid in the supine position.  General endotracheal anesthesia was induced without complication.  The bed remained straight.  Before proceeding any further, a full time-out was performed identifying the correct patient and the operative site.  Then we examined the nasal cavity.  We injected the nasal septum and the inferior turbinates with the use of 1% lidocaine with 1:100,000 epinephrine.  Bilateral nasal cavities were packed with epinephrine-soaked cottonoid pledgets.  We set up the navigation device and the Bell Boardz navigation system, and they were found to be functioning appropriately.  The patient was then prepped and draped in the usual sterile fashion.  We first began with examination of the right nasal cavity.  There were large nasal polyps extending out of the middle meatus and into the nasal cavity.  These were sampled with a Jennifer forceps and then sent for routine histopathology.  The same was performed on the left nasal cavity, again with large nasal polyps extending out of the middle meatus.  These were sampled again with a Jennifer forceps and sent for routine histopathology.  We first began with septoplasty given the limited access to the right nasal cavity.  We made a hemitransfixion incision on the left-hand side and dissected down to the level of the cartilage.  Then raising submucoperichondrial and submucoperiosteal flaps on the left-hand side, we identified an L strut about 1.5 cm to 2 cm from the caudal septum.  This was then incised with the use of a 15 blade, and a [place] elevator was used to complete the incision, then raising the flap on the other side submucoperichondrially and submucoperiosteally.  This was elevated all the way posteriorly to the vomer.  Then  a large piece of cartilage which was deviated to the right was removed using a Cony swivel knife.  Using gentle Luther-Abreu scissors, we then removed a large portion of the vomer and perpendicular plate of the ethmoid bone which was also deviated.  Once we evaluated this further, we had great reduction.  Luther-Abreu rongeurs were used to remove a little bit more of the dorsal septum caudally.  Then we turned our attention to the right nasal cavity.  The middle meatus was accessed after using a microdebrider to remove some of the nasal polyps in the nasal cavity.  Then we identified the uncinate process.  The uncinate was bisected with the use of a backbiter, and the superior portion of the uncinate was removed with the up-cutting instrument.  The remainder of the inferior portion of the uncinate was removed with microdebrider, and the posterior fontanelle was dilated and removed with straight-cutting instruments.  Ethmoid bulla or what remained of it was taken down with microdebrider until we identified the lamina papyracea laterally and the basal lamella of the middle turbinate posteriorly.  We then entered the basal lamella low and medial after identifying the roof of the maxillary sinus.  Then posterior ethmoidectomy was completed using up-cutting instrumentation as well as microdebrider.  We identified the superior turbinate, and the inferior 1/3 of the superior turbinate was removed to identify the sphenoid os.  The sphenoid was then entered with a curette and then downfractured.  The face of the sphenoid, although nondominant on this side, was removed with the Kerrison punch.  Then identifying the sphenoid skull base, we performed dissection from a posterior-to-anterior direction with the use of microdebrider, taking care not to violate the skull base.  Once this had been adequately achieved, we then packed the middle meatus using epinephrine-soaked cottonoid pledgets, and we turned our  attention to the opposite side.  The same procedure was performed to remove the nasal polyps in the nasal cavity up into the middle meatus.  We identified the uncinate process and took it down with a backbiter.  The posterior fontanelle was dilated and removed with a straight through-cutting instrument.  The remainder of the uncinate was removed using microdebrider.  We identified ethmoid bulla and took this down to the level of the medial orbital wall, and then we identified the basal lamella of the middle turbinate.  The basal lamella was then penetrated with the use of a curette, and a posterior ethmoidectomy was achieved using up-cutting instruments and microdebrider.  We identified the superior turbinate.  The inferior third of the superior turbinate was removed with a straight-cutting instrument.  We identified the sphenoid os.  This was entered with a J curette and downfractured.  The face of the sphenoid was removed with a Kerrison, and we identified the sphenoid skull base.  Then dissecting from a posterior-to-anterior direction, we removed all partitions up to the level of the skull base and out laterally toward the level of the lamina papyracea.  Now that this had been achieved, we then packed the middle meatus with the use of epinephrine-soaked cottonoid pledgets.  We then changed to the 70-degree telescope and turned our attention back to the right-hand side.  We identified the frontal sinus outflow tract and removed any polypoid mucosa with the use of the Rad 40.  Then using a mini-Hosemann, we took down the frontal sinus ostia more anteriorly at the frontal beak.  We took this down widely to make a large oval frontal sinusotomy, and then looking up into the frontal sinus we saw no evidence of polyps or further disease.  We did the same on the left-hand side, dissecting in the frontal sinus outflow tract and removing any partitions.  The Hosemann was used to enlarge the frontal sinusotomy.  After  adequate frontal sinusotomies, we then placed Propel stents on both sides, and the middle meatus was packed with the use of NasoPore.  The inferior turbinates were then reduced with the use of a microdebrider for submucous resection.  We began on the right-hand side and took down the axilla of the inferior turbinate with a microdebrider.  We then raised a medial flap all the way posteriorly to the mulberry turbinate.  The periosteum was incised with a 15 blade, and a Werner elevator was used to elevate this medial flap in a submucoperiosteal plane.  Then the inferior turbinate bone and the lateral mucosa were removed with the curved iris scissors.  Then the medial flap was then laid back down in position, and we bipolared the posterior aspect of the inferior turbinate.  We then turned our attention to the opposite side.  The same procedure was performed where we took down the axilla of the inferior turbinate.  We raised a medial flap and then elevated this with a [place] elevator.  Then the inferior turbinate bone and lateral mucosa were removed with the curved iris scissors.  The posterior aspect of the inferior turbinate and the posterior inferior turbinate artery were secured with bipolar cautery.  After this had been achieved, the medial flap was then placed back into position.  The nasal cavity was diffusely irrigated out and suctioned.  The patient tolerated the procedure very well, and there were no complications.  The septal flaps were closed with interrupted 4-0 plain gut, and the hemitransfixion incision was closed with a 5-0 chromic in simple, interrupted fashion.  She tolerated the procedure.  There were no complications.       Dr. Springer was present and available for all portions of the procedure.        ______________________________  Trip Anthony MD    ______________________________  MD WENDI Luna/HOLLISL  DD:  02/27/2018  Time:  05:48PM  DT:  02/27/2018  Time:  09:54PM  Job #:   [id number]

## 2022-05-24 ENCOUNTER — LAB VISIT (OUTPATIENT)
Dept: LAB | Facility: HOSPITAL | Age: 42
End: 2022-05-24
Attending: INTERNAL MEDICINE
Payer: MEDICAID

## 2022-05-24 DIAGNOSIS — D72.829 LEUKOCYTOSIS, UNSPECIFIED TYPE: ICD-10-CM

## 2022-05-24 LAB
ALBUMIN SERPL-MCNC: 3.4 GM/DL (ref 3.5–5)
ALBUMIN/GLOB SERPL: 0.9 RATIO (ref 1.1–2)
ALP SERPL-CCNC: 83 UNIT/L (ref 40–150)
ALT SERPL-CCNC: 30 UNIT/L (ref 0–55)
AST SERPL-CCNC: 24 UNIT/L (ref 5–34)
BASOPHILS # BLD AUTO: 0.03 X10(3)/MCL (ref 0–0.2)
BASOPHILS NFR BLD AUTO: 0.3 %
BILIRUBIN DIRECT+TOT PNL SERPL-MCNC: 0.4 MG/DL
BUN SERPL-MCNC: 12 MG/DL (ref 7–18.7)
CALCIUM SERPL-MCNC: 9.1 MG/DL (ref 8.4–10.2)
CHLORIDE SERPL-SCNC: 105 MMOL/L (ref 98–107)
CO2 SERPL-SCNC: 28 MMOL/L (ref 22–29)
CREAT SERPL-MCNC: 0.7 MG/DL (ref 0.55–1.02)
EOSINOPHIL # BLD AUTO: 0.31 X10(3)/MCL (ref 0–0.9)
EOSINOPHIL NFR BLD AUTO: 3.2 %
ERYTHROCYTE [DISTWIDTH] IN BLOOD BY AUTOMATED COUNT: 15.4 % (ref 11.5–17)
FERRITIN SERPL-MCNC: 229.96 NG/ML (ref 4.63–204)
GLOBULIN SER-MCNC: 3.9 GM/DL (ref 2.4–3.5)
GLUCOSE SERPL-MCNC: 107 MG/DL (ref 74–100)
HCT VFR BLD AUTO: 39.5 % (ref 37–47)
HGB BLD-MCNC: 12 GM/DL (ref 12–16)
IMM GRANULOCYTES # BLD AUTO: 0.05 X10(3)/MCL (ref 0–0.02)
IMM GRANULOCYTES NFR BLD AUTO: 0.5 % (ref 0–0.43)
IRON SATN MFR SERPL: 21 % (ref 20–50)
IRON SERPL-MCNC: 46 UG/DL (ref 50–170)
LYMPHOCYTES # BLD AUTO: 2.05 X10(3)/MCL (ref 0.6–4.6)
LYMPHOCYTES NFR BLD AUTO: 21 %
MCH RBC QN AUTO: 25.5 PG (ref 27–31)
MCHC RBC AUTO-ENTMCNC: 30.4 MG/DL (ref 33–36)
MCV RBC AUTO: 83.9 FL (ref 80–94)
MONOCYTES # BLD AUTO: 0.49 X10(3)/MCL (ref 0.1–1.3)
MONOCYTES NFR BLD AUTO: 5 %
NEUTROPHILS # BLD AUTO: 6.8 X10(3)/MCL (ref 2.1–9.2)
NEUTROPHILS NFR BLD AUTO: 70 %
NRBC BLD AUTO-RTO: 0 %
PLATELET # BLD AUTO: 282 X10(3)/MCL (ref 130–400)
PMV BLD AUTO: 9.1 FL (ref 9.4–12.4)
POTASSIUM SERPL-SCNC: 4.3 MMOL/L (ref 3.5–5.1)
PROT SERPL-MCNC: 7.3 GM/DL (ref 6.4–8.3)
RBC # BLD AUTO: 4.71 X10(6)/MCL (ref 4.2–5.4)
SODIUM SERPL-SCNC: 142 MMOL/L (ref 136–145)
TIBC SERPL-MCNC: 169 UG/DL (ref 70–310)
TIBC SERPL-MCNC: 215 UG/DL (ref 250–450)
TRANSFERRIN SERPL-MCNC: 193 MG/DL (ref 180–382)
WBC # SPEC AUTO: 9.8 X10(3)/MCL (ref 4.5–11.5)

## 2022-05-24 PROCEDURE — 83540 ASSAY OF IRON: CPT

## 2022-05-24 PROCEDURE — 85025 COMPLETE CBC W/AUTO DIFF WBC: CPT

## 2022-05-24 PROCEDURE — 36415 COLL VENOUS BLD VENIPUNCTURE: CPT

## 2022-05-24 PROCEDURE — 80053 COMPREHEN METABOLIC PANEL: CPT

## 2022-05-24 PROCEDURE — 82728 ASSAY OF FERRITIN: CPT

## 2022-05-24 RX ORDER — LOSARTAN POTASSIUM 50 MG/1
1 TABLET ORAL DAILY
COMMUNITY
Start: 2022-01-31 | End: 2023-02-02 | Stop reason: SDUPTHER

## 2022-05-24 RX ORDER — PANTOPRAZOLE SODIUM 40 MG/1
40 TABLET, DELAYED RELEASE ORAL DAILY
COMMUNITY
Start: 2021-09-30 | End: 2023-10-13 | Stop reason: SDUPTHER

## 2022-05-24 RX ORDER — FERROUS GLUCONATE 324(38)MG
1 TABLET ORAL 2 TIMES DAILY
COMMUNITY
Start: 2022-04-21 | End: 2023-03-20

## 2022-05-24 RX ORDER — IPRATROPIUM BROMIDE AND ALBUTEROL SULFATE 2.5; .5 MG/3ML; MG/3ML
3 SOLUTION RESPIRATORY (INHALATION) 4 TIMES DAILY
COMMUNITY
Start: 2021-09-25 | End: 2022-07-19

## 2022-05-24 RX ORDER — TIOTROPIUM BROMIDE 18 UG/1
2 CAPSULE ORAL; RESPIRATORY (INHALATION) DAILY
COMMUNITY
Start: 2022-05-09 | End: 2022-11-02

## 2022-05-24 RX ORDER — ALBUTEROL SULFATE 90 UG/1
2 AEROSOL, METERED RESPIRATORY (INHALATION) EVERY 4 HOURS PRN
COMMUNITY
Start: 2022-04-21 | End: 2022-09-26

## 2022-05-24 RX ORDER — BUDESONIDE AND FORMOTEROL FUMARATE DIHYDRATE 160; 4.5 UG/1; UG/1
2 AEROSOL RESPIRATORY (INHALATION) 2 TIMES DAILY
COMMUNITY
Start: 2022-04-01 | End: 2023-04-24

## 2022-06-15 ENCOUNTER — OFFICE VISIT (OUTPATIENT)
Dept: HEMATOLOGY/ONCOLOGY | Facility: CLINIC | Age: 42
End: 2022-06-15
Payer: MEDICAID

## 2022-06-15 VITALS
RESPIRATION RATE: 18 BRPM | DIASTOLIC BLOOD PRESSURE: 63 MMHG | OXYGEN SATURATION: 95 % | HEART RATE: 75 BPM | HEIGHT: 62 IN | BODY MASS INDEX: 50.35 KG/M2 | SYSTOLIC BLOOD PRESSURE: 128 MMHG | TEMPERATURE: 98 F | WEIGHT: 273.63 LBS

## 2022-06-15 DIAGNOSIS — D72.829 LEUKOCYTOSIS, UNSPECIFIED TYPE: Primary | ICD-10-CM

## 2022-06-15 PROCEDURE — 1159F PR MEDICATION LIST DOCUMENTED IN MEDICAL RECORD: ICD-10-PCS | Mod: CPTII,,, | Performed by: INTERNAL MEDICINE

## 2022-06-15 PROCEDURE — 99212 PR OFFICE/OUTPT VISIT, EST, LEVL II, 10-19 MIN: ICD-10-PCS | Mod: ,,, | Performed by: INTERNAL MEDICINE

## 2022-06-15 PROCEDURE — 3074F SYST BP LT 130 MM HG: CPT | Mod: CPTII,,, | Performed by: INTERNAL MEDICINE

## 2022-06-15 PROCEDURE — 1159F MED LIST DOCD IN RCRD: CPT | Mod: CPTII,,, | Performed by: INTERNAL MEDICINE

## 2022-06-15 PROCEDURE — 3074F PR MOST RECENT SYSTOLIC BLOOD PRESSURE < 130 MM HG: ICD-10-PCS | Mod: CPTII,,, | Performed by: INTERNAL MEDICINE

## 2022-06-15 PROCEDURE — 3008F BODY MASS INDEX DOCD: CPT | Mod: CPTII,,, | Performed by: INTERNAL MEDICINE

## 2022-06-15 PROCEDURE — 3078F DIAST BP <80 MM HG: CPT | Mod: CPTII,,, | Performed by: INTERNAL MEDICINE

## 2022-06-15 PROCEDURE — 4010F PR ACE/ARB THEARPY RXD/TAKEN: ICD-10-PCS | Mod: CPTII,,, | Performed by: INTERNAL MEDICINE

## 2022-06-15 PROCEDURE — 3078F PR MOST RECENT DIASTOLIC BLOOD PRESSURE < 80 MM HG: ICD-10-PCS | Mod: CPTII,,, | Performed by: INTERNAL MEDICINE

## 2022-06-15 PROCEDURE — 4010F ACE/ARB THERAPY RXD/TAKEN: CPT | Mod: CPTII,,, | Performed by: INTERNAL MEDICINE

## 2022-06-15 PROCEDURE — 3008F PR BODY MASS INDEX (BMI) DOCUMENTED: ICD-10-PCS | Mod: CPTII,,, | Performed by: INTERNAL MEDICINE

## 2022-06-15 PROCEDURE — 99212 OFFICE O/P EST SF 10 MIN: CPT | Mod: ,,, | Performed by: INTERNAL MEDICINE

## 2022-06-15 NOTE — PROGRESS NOTES
[facility]    PATIENT: Mellissa Ann Milligan  MRN: [id number]  DATE: 6/15/2022      Diagnosis:   1. Leukocytosis, unspecified type        Chief Complaint: Abdominal Pain        Heme/Onc History:   History of Present Illness:  Pt with a history of iron deficiency anemia which is slowly improving on oral iron. She was found to have esophageal erosions on EGD and a normal colonoscopy. She was also noted to have a leucocytosis, but this has not resolved with treatment of her iron deficiency. She notes back pain and hip pain which is chronic, and this limits her ability to exercise. She denies joint pain or swelling. Denies morning stiffness. She notes poor dentition, and many of her teeth are broken off at the gumline. No significant dental pain.    . Leucocytosis D72.829 Pt with mild leucocytosis. Possibly related to iron deficiency (reactive), but doubt, as this has not improved with iron supplementation. Will have her continue her oral iron. Suspect leucocytosis is related to obesity, but could also be due to chronic inflammation/infection from dental disease. Will check CRP. Discussed need for dental care and for weight loss for inproving general health. F/U 1 month.      Subjective:    Interval History: Ms. Milligan returns for follow up of a mild leucocytosis.  On repeat, her WBC was normal.  Iron was slightly under the normal range, but her % sat was normal and ferritin was elevated. No new problems.     Past Medical History:   Past Medical History:   Diagnosis Date    Essential (primary) hypertension     Leukocytosis     Mild intermittent asthma, uncomplicated     Morbid obesity     Nasal congestion     Sleep apnea, unspecified        Past Surgical HIstory:   Past Surgical History:   Procedure Laterality Date     SECTION      CHOLECYSTECTOMY      COLONOSCOPY      EGD, WITH BALLOON DILATION      FESS, WITH DACRYOCYSTORHINOSTOMY      NASAL TURBINATE REDUCTION       SINUS SURGERY         Family History:   Family History   Problem Relation Age of Onset    Diabetes Mother        Social History:  reports that she has never smoked. She has never used smokeless tobacco. She reports that she does not drink alcohol and does not use drugs.    Allergies:  Review of patient's allergies indicates:  No Known Allergies    Medications:  Current Outpatient Medications   Medication Sig Dispense Refill    albuterol (PROVENTIL/VENTOLIN HFA) 90 mcg/actuation inhaler Inhale 2 puffs into the lungs every 4 (four) hours as needed.      albuterol-ipratropium (DUO-NEB) 2.5 mg-0.5 mg/3 mL nebulizer solution Take 3 mLs by nebulization 4 (four) times daily.      ferrous gluconate (FERGON) 324 MG tablet Take 1 tablet by mouth 2 (two) times a day.      losartan (COZAAR) 50 MG tablet Take 1 tablet by mouth once daily.      pantoprazole (PROTONIX) 40 MG tablet Take 40 mg by mouth once daily.      SPIRIVA WITH HANDIHALER 18 mcg inhalation capsule Inhale 2 capsules into the lungs once daily.      SYMBICORT 160-4.5 mcg/actuation HFAA Inhale 2 puffs into the lungs 2 (two) times daily.       No current facility-administered medications for this visit.       Review of Systems   Constitutional: Negative for appetite change and unexpected weight change.   HENT: Negative for mouth sores.    Eyes: Negative for visual disturbance.   Respiratory: Negative for cough and shortness of breath.    Cardiovascular: Negative for chest pain.   Gastrointestinal: Positive for abdominal pain. Negative for diarrhea.        Abd pain is menstrual cramping.    Genitourinary: Negative for frequency.   Musculoskeletal: Negative for back pain.   Skin: Negative for rash.   Neurological: Negative for headaches.   Hematological: Negative for adenopathy.   Psychiatric/Behavioral: The patient is not nervous/anxious.        Objective:      Vitals:   Vitals:    06/15/22 1307   BP: 128/63   BP Location: Right arm   Patient Position:  "Sitting   BP Method: Large (Automatic)   Pulse: 75   Resp: 18   Temp: 98.3 °F (36.8 °C)   TempSrc: Oral   SpO2: 95%   Weight: 124.1 kg (273 lb 9.6 oz)   Height: 5' 1.5" (1.562 m)     BMI: Body mass index is 50.86 kg/m².    Physical Exam  Vitals reviewed.   Constitutional:       Appearance: She is obese.   Neurological:      Mental Status: She is alert.         Laboratory Data:      Imaging:   Assessment/Plan:       1. Leukocytosis, unspecified type    WBC is now normal.  Suspect it runs slightly high or on the high side of normal due to dental inflammation/infection and obesity.  No evidence of hematologic malignancy.  F/U with PCP.  I appreciate the opportunity to evaluate Venus.             Gisel Olguin MD    "

## 2022-07-18 ENCOUNTER — HOSPITAL ENCOUNTER (EMERGENCY)
Facility: HOSPITAL | Age: 42
Discharge: HOME | End: 2022-07-18
Attending: FAMILY MEDICINE
Payer: MEDICAID

## 2022-07-18 VITALS
OXYGEN SATURATION: 99 % | RESPIRATION RATE: 22 BRPM | HEIGHT: 60 IN | BODY MASS INDEX: 53.01 KG/M2 | HEART RATE: 80 BPM | WEIGHT: 270 LBS | TEMPERATURE: 100 F | SYSTOLIC BLOOD PRESSURE: 111 MMHG | DIASTOLIC BLOOD PRESSURE: 87 MMHG

## 2022-07-18 DIAGNOSIS — R05.9 COUGH: ICD-10-CM

## 2022-07-18 DIAGNOSIS — J21.0 RSV (ACUTE BRONCHIOLITIS DUE TO RESPIRATORY SYNCYTIAL VIRUS): Primary | ICD-10-CM

## 2022-07-18 LAB
FLUAV AG UPPER RESP QL IA.RAPID: NOT DETECTED
FLUBV AG UPPER RESP QL IA.RAPID: NOT DETECTED
SARS-COV-2 RNA RESP QL NAA+PROBE: NOT DETECTED

## 2022-07-18 PROCEDURE — 99284 EMERGENCY DEPT VISIT MOD MDM: CPT | Mod: 25

## 2022-07-18 PROCEDURE — 87636 SARSCOV2 & INF A&B AMP PRB: CPT | Performed by: FAMILY MEDICINE

## 2022-07-18 RX ORDER — METHYLPREDNISOLONE 4 MG/1
TABLET ORAL
Qty: 1 EACH | Refills: 0 | Status: SHIPPED | OUTPATIENT
Start: 2022-07-18 | End: 2022-08-08

## 2022-07-18 NOTE — ED NOTES
Pt has an ongoing history of asthma.  Stated that she started having SOB and cough on Friday.  Called primary care Dr, but was not able to be seen.  Pt states SOB upon exertion.  Has been using breathing treatments at home over the weekend.

## 2022-07-18 NOTE — ED PROVIDER NOTES
Encounter Date: 2022       History     Chief Complaint   Patient presents with    Cough    COVID-19 Concerns     Cough and congestion started Friday. Wants to get tested for covid     This patient is a 41-year-old female who comes in with upper respiratory symptoms, nasal congestion, cough that started 3 days ago.  She states that she might have COVID and she wants to be tested.  Patient denies fever, denies nausea vomiting or diarrhea.    The history is provided by the patient.   Cough  This is a new problem. The current episode started several days ago. The problem occurs constantly. The problem has been unchanged. The cough is productive of sputum. There has been no fever. The fever has been present for 3 to 4 days. Associated symptoms include rhinorrhea. She has tried nothing for the symptoms. She is not a smoker.     Review of patient's allergies indicates:  No Known Allergies  Past Medical History:   Diagnosis Date    Essential (primary) hypertension     Leukocytosis     Mild intermittent asthma, uncomplicated     Morbid obesity     Nasal congestion     Sleep apnea, unspecified      Past Surgical History:   Procedure Laterality Date     SECTION      CHOLECYSTECTOMY      COLONOSCOPY      EGD, WITH BALLOON DILATION      FESS, WITH DACRYOCYSTORHINOSTOMY      NASAL TURBINATE REDUCTION      SINUS SURGERY       Family History   Problem Relation Age of Onset    Diabetes Mother      Social History     Tobacco Use    Smoking status: Never Smoker    Smokeless tobacco: Never Used   Substance Use Topics    Alcohol use: Never    Drug use: Never     Review of Systems   Constitutional: Negative.    HENT: Positive for rhinorrhea.    Respiratory: Positive for cough.    Cardiovascular: Negative.    Endocrine: Negative.    Neurological: Negative.    Psychiatric/Behavioral: Negative.    All other systems reviewed and are negative.      Physical Exam     Initial Vitals [22 1112]   BP Pulse  Resp Temp SpO2   (!) 140/67 101 (!) 22 99.8 °F (37.7 °C) 97 %      MAP       --         Physical Exam    Nursing note and vitals reviewed.  Constitutional: She appears well-developed.   HENT:   Head: Normocephalic.   Nose: Rhinorrhea present. Right sinus exhibits maxillary sinus tenderness. Left sinus exhibits maxillary sinus tenderness.   Mouth/Throat: Posterior oropharyngeal erythema present.   Eyes: Pupils are equal, round, and reactive to light.   Neck:   Normal range of motion.  Cardiovascular: Normal rate, regular rhythm and normal heart sounds.   Pulmonary/Chest: Breath sounds normal.   Abdominal: Abdomen is soft.   Musculoskeletal:         General: Normal range of motion.      Cervical back: Normal range of motion.     Neurological: She is alert and oriented to person, place, and time.   Skin: Skin is warm and dry.   Psychiatric: She has a normal mood and affect.         ED Course   Procedures  Labs Reviewed   COVID/FLU A&B PCR - Normal          Imaging Results          X-Ray Chest PA And Lateral (Final result)  Result time 07/18/22 12:42:36    Final result by Christopher Muñoz MD (07/18/22 12:42:36)                 Impression:      No acute cardiopulmonary process identified.      Electronically signed by: Christopher Muñoz  Date:    07/18/2022  Time:    12:42             Narrative:    EXAMINATION:  XR CHEST PA AND LATERAL    CLINICAL HISTORY:  Cough, unspecified    TECHNIQUE:  Two-view    COMPARISON:  April 30, 2020.    FINDINGS:  Cardiopericardial silhouette is within normal limits.  Hyperinflated lungs are without dense focal or segmental consolidation, overt congestion, pleural effusion or pneumothorax.                                 Medications - No data to display  Medical Decision Making:   Initial Assessment:   Upper respiratory symptoms  Differential Diagnosis:   COVID, flu, RSV  Clinical Tests:   Lab Tests: Ordered and Reviewed  ED Management:  Patient swab came back positive for RSV                       Clinical Impression:   Final diagnoses:  [R05.9] Cough  [J21.0] RSV (acute bronchiolitis due to respiratory syncytial virus) (Primary)          ED Disposition Condition    Discharge Stable        ED Prescriptions     Medication Sig Dispense Start Date End Date Auth. Provider    methylPREDNISolone (MEDROL DOSEPACK) 4 mg tablet Sig: Take as directed 1 each 7/18/2022 8/8/2022 Christa Huddleston MD        Follow-up Information     Follow up With Specialties Details Why Contact Info    Doris Ramirez MD Family Medicine Schedule an appointment as soon as possible for a visit in 2 days  [address] [facility] [address]  [phone]             Christa Huddleston MD  07/18/22 2275

## 2022-09-21 ENCOUNTER — OFFICE VISIT (OUTPATIENT)
Dept: FAMILY MEDICINE | Facility: CLINIC | Age: 42
End: 2022-09-21
Payer: MEDICAID

## 2022-09-21 VITALS — HEIGHT: 60 IN | BODY MASS INDEX: 53.01 KG/M2

## 2022-09-21 DIAGNOSIS — R09.81 SINUS CONGESTION: Primary | ICD-10-CM

## 2022-09-21 PROBLEM — J45.909 ASTHMA: Status: ACTIVE | Noted: 2022-09-21

## 2022-09-21 PROBLEM — E66.01 MORBID OBESITY: Status: ACTIVE | Noted: 2022-09-21

## 2022-09-21 PROBLEM — G47.33 OBSTRUCTIVE SLEEP APNEA SYNDROME: Status: ACTIVE | Noted: 2022-09-21

## 2022-09-21 PROBLEM — I10 HYPERTENSION: Status: ACTIVE | Noted: 2022-09-21

## 2022-09-21 PROBLEM — E66.2 EXTREME OBESITY WITH ALVEOLAR HYPOVENTILATION: Status: ACTIVE | Noted: 2022-09-21

## 2022-09-21 PROCEDURE — 1159F MED LIST DOCD IN RCRD: CPT | Mod: CPTII,95,, | Performed by: FAMILY MEDICINE

## 2022-09-21 PROCEDURE — 1160F PR REVIEW ALL MEDS BY PRESCRIBER/CLIN PHARMACIST DOCUMENTED: ICD-10-PCS | Mod: CPTII,95,, | Performed by: FAMILY MEDICINE

## 2022-09-21 PROCEDURE — 99213 PR OFFICE/OUTPT VISIT, EST, LEVL III, 20-29 MIN: ICD-10-PCS | Mod: 95,,, | Performed by: FAMILY MEDICINE

## 2022-09-21 PROCEDURE — 1159F PR MEDICATION LIST DOCUMENTED IN MEDICAL RECORD: ICD-10-PCS | Mod: CPTII,95,, | Performed by: FAMILY MEDICINE

## 2022-09-21 PROCEDURE — 99213 OFFICE O/P EST LOW 20 MIN: CPT | Mod: 95,,, | Performed by: FAMILY MEDICINE

## 2022-09-21 PROCEDURE — 4010F PR ACE/ARB THEARPY RXD/TAKEN: ICD-10-PCS | Mod: CPTII,95,, | Performed by: FAMILY MEDICINE

## 2022-09-21 PROCEDURE — 4010F ACE/ARB THERAPY RXD/TAKEN: CPT | Mod: CPTII,95,, | Performed by: FAMILY MEDICINE

## 2022-09-21 PROCEDURE — 3008F PR BODY MASS INDEX (BMI) DOCUMENTED: ICD-10-PCS | Mod: CPTII,95,, | Performed by: FAMILY MEDICINE

## 2022-09-21 PROCEDURE — 3008F BODY MASS INDEX DOCD: CPT | Mod: CPTII,95,, | Performed by: FAMILY MEDICINE

## 2022-09-21 PROCEDURE — 1160F RVW MEDS BY RX/DR IN RCRD: CPT | Mod: CPTII,95,, | Performed by: FAMILY MEDICINE

## 2022-09-21 RX ORDER — FLUTICASONE PROPIONATE 50 MCG
2 SPRAY, SUSPENSION (ML) NASAL DAILY
COMMUNITY
Start: 2022-09-06 | End: 2023-10-13 | Stop reason: SDUPTHER

## 2022-09-21 RX ORDER — AMOXICILLIN AND CLAVULANATE POTASSIUM 875; 125 MG/1; MG/1
1 TABLET, FILM COATED ORAL EVERY 12 HOURS
Qty: 14 TABLET | Refills: 0 | Status: SHIPPED | OUTPATIENT
Start: 2022-09-21 | End: 2022-09-28

## 2022-09-21 NOTE — PROGRESS NOTES
Patient ID: [id number]     Chief Complaint: Sinus Problem and Cough      HPI:     This is a telemedicine note. Patient was treated using telemedicine, real time audio and video, according to [facility] protocols. I, Sebastian Davila DO, conducted the visit from the [facility]. The patient participated in the visit at a non-[facility] location selected by the patient, identified below. I am licensed in the state where the patient stated they are located. The patient stated that they understood and accepted the privacy and security risks to their information at their location. This visit is not recorded.    Patient was located at the patient's home.       Mellissa Ann Milligan is a 41 y.o. female here today for a telemedicine visit.   Sinus congestion started 3 weeks ago. Went to [facility] in [place]. Was given steroid shot and possibly an antibiotic shot also. Prescribed Flonase and a Z pack. Answers submitted by the patient for this visit:  Review of Systems Questionnaire (Submitted on 2022)  activity change: No  unexpected weight change: No  rhinorrhea: No  trouble swallowing: No  visual disturbance: No  chest tightness: No  polyuria: No  difficulty urinating: No  menstrual problem: No  joint swelling: No  arthralgias: No  confusion: No  dysphoric mood: No        ----------------------------  Essential (primary) hypertension  Leukocytosis  Mild intermittent asthma, uncomplicated  Morbid obesity  Nasal congestion  Sleep apnea, unspecified     Past Surgical History:   Procedure Laterality Date     SECTION      CHOLECYSTECTOMY      COLONOSCOPY      EGD, WITH BALLOON DILATION      FESS, WITH DACRYOCYSTORHINOSTOMY      NASAL TURBINATE REDUCTION      SINUS SURGERY         Review of patient's allergies indicates:  No Known Allergies    Outpatient Medications Marked as Taking for the 22 encounter (Office Visit) with Sebastian Davila DO   Medication Sig Dispense Refill    albuterol  "(PROVENTIL/VENTOLIN HFA) 90 mcg/actuation inhaler Inhale 2 puffs into the lungs every 4 (four) hours as needed.      albuterol-ipratropium (DUO-NEB) 2.5 mg-0.5 mg/3 mL nebulizer solution INHALE 1 VIAL VIA NEBULIZER 4 TIMES A  mL 0    ferrous gluconate (FERGON) 324 MG tablet Take 1 tablet by mouth 2 (two) times a day.      fluticasone propionate (FLONASE) 50 mcg/actuation nasal spray 2 sprays by Each Nostril route once daily.      losartan (COZAAR) 50 MG tablet Take 1 tablet by mouth once daily.      pantoprazole (PROTONIX) 40 MG tablet Take 40 mg by mouth once daily.      SPIRIVA WITH HANDIHALER 18 mcg inhalation capsule Inhale 2 capsules into the lungs once daily.      SYMBICORT 160-4.5 mcg/actuation HFAA Inhale 2 puffs into the lungs 2 (two) times daily.         Social History     Socioeconomic History    Marital status:    Tobacco Use    Smoking status: Never    Smokeless tobacco: Never   Substance and Sexual Activity    Alcohol use: Never    Drug use: Never        Family History   Problem Relation Age of Onset    Diabetes Mother     COPD Mother         Patient Care Team:  Doris Ramirez MD as PCP - General (Family Medicine)      Subjective:       Review of Systems   Constitutional:  Negative for chills and fever.   HENT:  Positive for congestion. Negative for sore throat.    Respiratory:  Positive for shortness of breath. Negative for cough and wheezing.    Cardiovascular:  Negative for chest pain.   Gastrointestinal:  Negative for abdominal pain, diarrhea, heartburn and nausea.   Neurological:  Positive for headaches.     See HPI for details  All Other ROS: Negative except as stated in HPI.       Objective:     Ht 4' 11.84" (1.52 m)   BMI 53.01 kg/m²     Physical Exam    Physical Exam: LIMITED DUE TO TELEMEDICINE RESTRICTIONS.  General: Alert and oriented, No acute distress.  Head: Normocephalic, Atraumatic.  Eye: Sclera non-icteric.  Neck/Thyroid:  Full range of motion.  Respiratory: " Non-labored respirations, Symmetrical chest wall expansion.  Musculoskeletal: Normal range of motion.  Integumentary:  No visible suspicious lesions or rashes. No diaphoresis.   Neurologic: No focal deficits  Psychiatric: Normal interaction, Coherent speech, Euthymic mood, Appropriate affect       Assessment:       ICD-10-CM ICD-9-CM   1. Sinus congestion  R09.81 478.19        Plan:     1. Sinus congestion  - amoxicillin-clavulanate 875-125mg (AUGMENTIN) 875-125 mg per tablet; Take 1 tablet by mouth every 12 (twelve) hours. for 7 days  Dispense: 14 tablet; Refill: 0  -Recommended OTC medications for sinus congestion. Will hold off on steroids at this time given patient received steroid shot recently. Advised her to take home COVID test to rule out. Cautioned patient on potential GI upset with augmentin.   Medication List with Changes/Refills   New Medications    AMOXICILLIN-CLAVULANATE 875-125MG (AUGMENTIN) 875-125 MG PER TABLET    Take 1 tablet by mouth every 12 (twelve) hours. for 7 days       Start Date: 9/21/2022 End Date: 9/28/2022   Current Medications    ALBUTEROL (PROVENTIL/VENTOLIN HFA) 90 MCG/ACTUATION INHALER    Inhale 2 puffs into the lungs every 4 (four) hours as needed.       Start Date: 4/21/2022 End Date: --    ALBUTEROL-IPRATROPIUM (DUO-NEB) 2.5 MG-0.5 MG/3 ML NEBULIZER SOLUTION    INHALE 1 VIAL VIA NEBULIZER 4 TIMES A DAY       Start Date: 7/19/2022 End Date: --    FERROUS GLUCONATE (FERGON) 324 MG TABLET    Take 1 tablet by mouth 2 (two) times a day.       Start Date: 4/21/2022 End Date: --    FLUTICASONE PROPIONATE (FLONASE) 50 MCG/ACTUATION NASAL SPRAY    2 sprays by Each Nostril route once daily.       Start Date: 9/6/2022  End Date: --    LOSARTAN (COZAAR) 50 MG TABLET    Take 1 tablet by mouth once daily.       Start Date: 1/31/2022 End Date: --    PANTOPRAZOLE (PROTONIX) 40 MG TABLET    Take 40 mg by mouth once daily.       Start Date: 9/30/2021 End Date: --    SPIRIVA WITH HANDIHALER 18 MCG  INHALATION CAPSULE    Inhale 2 capsules into the lungs once daily.       Start Date: 5/9/2022  End Date: --    SYMBICORT 160-4.5 MCG/ACTUATION HFAA    Inhale 2 puffs into the lungs 2 (two) times daily.       Start Date: 4/1/2022  End Date: --          Follow up in about 2 weeks (around 10/5/2022), or if symptoms worsen or fail to improve. In addition to their scheduled follow up, the patient has also been instructed to follow up on as needed basis.       Video Time Documentation:  Spent 10 minutes with patient face to face discussed health concerns. More than 50% of this time was spent in counseling and coordination of care.

## 2022-09-23 DIAGNOSIS — J45.909 ASTHMA, UNSPECIFIED ASTHMA SEVERITY, UNSPECIFIED WHETHER COMPLICATED, UNSPECIFIED WHETHER PERSISTENT: Primary | ICD-10-CM

## 2022-09-26 RX ORDER — ALBUTEROL SULFATE 90 UG/1
AEROSOL, METERED RESPIRATORY (INHALATION)
Qty: 18 G | Refills: 5 | Status: SHIPPED | OUTPATIENT
Start: 2022-09-26 | End: 2023-06-09

## 2022-09-28 DIAGNOSIS — D50.9 IRON DEFICIENCY ANEMIA, UNSPECIFIED IRON DEFICIENCY ANEMIA TYPE: ICD-10-CM

## 2022-09-28 DIAGNOSIS — Z00.00 WELLNESS EXAMINATION: Primary | ICD-10-CM

## 2022-09-28 DIAGNOSIS — E61.1 DIETARY IRON DEFICIENCY: ICD-10-CM

## 2022-09-28 DIAGNOSIS — E66.01 MORBID OBESITY: ICD-10-CM

## 2022-09-28 DIAGNOSIS — J45.909 ASTHMA, UNSPECIFIED ASTHMA SEVERITY, UNSPECIFIED WHETHER COMPLICATED, UNSPECIFIED WHETHER PERSISTENT: ICD-10-CM

## 2022-09-28 DIAGNOSIS — I10 HYPERTENSION, UNSPECIFIED TYPE: ICD-10-CM

## 2022-09-28 DIAGNOSIS — G47.33 OSA ON CPAP: ICD-10-CM

## 2022-09-28 DIAGNOSIS — E66.2 OBESITY HYPOVENTILATION SYNDROME: ICD-10-CM

## 2022-09-28 DIAGNOSIS — D72.829 LEUKOCYTOSIS, UNSPECIFIED TYPE: ICD-10-CM

## 2022-09-28 DIAGNOSIS — R73.03 PREDIABETES: ICD-10-CM

## 2022-10-04 ENCOUNTER — LAB VISIT (OUTPATIENT)
Dept: LAB | Facility: HOSPITAL | Age: 42
End: 2022-10-04
Attending: FAMILY MEDICINE
Payer: MEDICAID

## 2022-10-04 DIAGNOSIS — D72.829 LEUKOCYTOSIS, UNSPECIFIED TYPE: ICD-10-CM

## 2022-10-04 DIAGNOSIS — I10 HYPERTENSION, UNSPECIFIED TYPE: ICD-10-CM

## 2022-10-04 DIAGNOSIS — J45.909 ASTHMA, UNSPECIFIED ASTHMA SEVERITY, UNSPECIFIED WHETHER COMPLICATED, UNSPECIFIED WHETHER PERSISTENT: ICD-10-CM

## 2022-10-04 DIAGNOSIS — G47.33 OSA ON CPAP: ICD-10-CM

## 2022-10-04 DIAGNOSIS — R73.03 PREDIABETES: ICD-10-CM

## 2022-10-04 DIAGNOSIS — Z00.00 WELLNESS EXAMINATION: ICD-10-CM

## 2022-10-04 DIAGNOSIS — D50.9 IRON DEFICIENCY ANEMIA, UNSPECIFIED IRON DEFICIENCY ANEMIA TYPE: ICD-10-CM

## 2022-10-04 DIAGNOSIS — E61.1 DIETARY IRON DEFICIENCY: ICD-10-CM

## 2022-10-04 DIAGNOSIS — E66.01 MORBID OBESITY: ICD-10-CM

## 2022-10-04 DIAGNOSIS — E66.2 OBESITY HYPOVENTILATION SYNDROME: ICD-10-CM

## 2022-10-04 LAB
ALBUMIN SERPL-MCNC: 3.5 GM/DL (ref 3.5–5)
ALBUMIN/GLOB SERPL: 0.9 RATIO (ref 1.1–2)
ALP SERPL-CCNC: 82 UNIT/L (ref 40–150)
ALT SERPL-CCNC: 42 UNIT/L (ref 0–55)
AST SERPL-CCNC: 35 UNIT/L (ref 5–34)
BASOPHILS # BLD AUTO: 0.05 X10(3)/MCL (ref 0–0.2)
BASOPHILS NFR BLD AUTO: 0.5 %
BILIRUBIN DIRECT+TOT PNL SERPL-MCNC: 0.3 MG/DL
BUN SERPL-MCNC: 7 MG/DL (ref 7–18.7)
CALCIUM SERPL-MCNC: 9.4 MG/DL (ref 8.4–10.2)
CHLORIDE SERPL-SCNC: 106 MMOL/L (ref 98–107)
CHOLEST SERPL-MCNC: 167 MG/DL
CHOLEST/HDLC SERPL: 5 {RATIO} (ref 0–5)
CO2 SERPL-SCNC: 28 MMOL/L (ref 22–29)
CREAT SERPL-MCNC: 0.66 MG/DL (ref 0.55–1.02)
DEPRECATED CALCIDIOL+CALCIFEROL SERPL-MC: 17.2 NG/ML (ref 30–80)
EOSINOPHIL # BLD AUTO: 0.85 X10(3)/MCL (ref 0–0.9)
EOSINOPHIL NFR BLD AUTO: 8.6 %
ERYTHROCYTE [DISTWIDTH] IN BLOOD BY AUTOMATED COUNT: 15.4 % (ref 11.5–17)
EST. AVERAGE GLUCOSE BLD GHB EST-MCNC: 119.8 MG/DL
FERRITIN SERPL-MCNC: 282.53 NG/ML (ref 4.63–204)
GFR SERPLBLD CREATININE-BSD FMLA CKD-EPI: >60 MLS/MIN/1.73/M2
GLOBULIN SER-MCNC: 3.7 GM/DL (ref 2.4–3.5)
GLUCOSE SERPL-MCNC: 85 MG/DL (ref 74–100)
HBA1C MFR BLD: 5.8 %
HCT VFR BLD AUTO: 39.6 % (ref 37–47)
HDLC SERPL-MCNC: 37 MG/DL (ref 35–60)
HGB BLD-MCNC: 12.7 GM/DL (ref 12–16)
IMM GRANULOCYTES # BLD AUTO: 0.04 X10(3)/MCL (ref 0–0.04)
IMM GRANULOCYTES NFR BLD AUTO: 0.4 %
IRON SATN MFR SERPL: 20 % (ref 20–50)
IRON SERPL-MCNC: 45 UG/DL (ref 50–170)
LDLC SERPL CALC-MCNC: 100 MG/DL (ref 50–140)
LYMPHOCYTES # BLD AUTO: 2.58 X10(3)/MCL (ref 0.6–4.6)
LYMPHOCYTES NFR BLD AUTO: 26.1 %
MCH RBC QN AUTO: 26.8 PG (ref 27–31)
MCHC RBC AUTO-ENTMCNC: 32.1 MG/DL (ref 33–36)
MCV RBC AUTO: 83.5 FL (ref 80–94)
MONOCYTES # BLD AUTO: 0.49 X10(3)/MCL (ref 0.1–1.3)
MONOCYTES NFR BLD AUTO: 4.9 %
NEUTROPHILS # BLD AUTO: 5.9 X10(3)/MCL (ref 2.1–9.2)
NEUTROPHILS NFR BLD AUTO: 59.5 %
NRBC BLD AUTO-RTO: 0 %
PLATELET # BLD AUTO: 219 X10(3)/MCL (ref 130–400)
PMV BLD AUTO: 8.9 FL (ref 7.4–10.4)
POTASSIUM SERPL-SCNC: 3.6 MMOL/L (ref 3.5–5.1)
PROT SERPL-MCNC: 7.2 GM/DL (ref 6.4–8.3)
RBC # BLD AUTO: 4.74 X10(6)/MCL (ref 4.2–5.4)
SODIUM SERPL-SCNC: 143 MMOL/L (ref 136–145)
TIBC SERPL-MCNC: 179 UG/DL (ref 70–310)
TIBC SERPL-MCNC: 224 UG/DL (ref 250–450)
TRANSFERRIN SERPL-MCNC: 205 MG/DL (ref 180–382)
TRIGL SERPL-MCNC: 151 MG/DL (ref 37–140)
TSH SERPL-ACNC: 2.1 UIU/ML (ref 0.35–4.94)
VIT B12 SERPL-MCNC: 776 PG/ML (ref 213–816)
VLDLC SERPL CALC-MCNC: 30 MG/DL
WBC # SPEC AUTO: 9.9 X10(3)/MCL (ref 4.5–11.5)

## 2022-10-04 PROCEDURE — 84443 ASSAY THYROID STIM HORMONE: CPT

## 2022-10-04 PROCEDURE — 83036 HEMOGLOBIN GLYCOSYLATED A1C: CPT

## 2022-10-04 PROCEDURE — 80061 LIPID PANEL: CPT

## 2022-10-04 PROCEDURE — 83540 ASSAY OF IRON: CPT

## 2022-10-04 PROCEDURE — 82607 VITAMIN B-12: CPT

## 2022-10-04 PROCEDURE — 82306 VITAMIN D 25 HYDROXY: CPT

## 2022-10-04 PROCEDURE — 80053 COMPREHEN METABOLIC PANEL: CPT

## 2022-10-04 PROCEDURE — 85025 COMPLETE CBC W/AUTO DIFF WBC: CPT

## 2022-10-04 PROCEDURE — 82728 ASSAY OF FERRITIN: CPT

## 2022-10-04 PROCEDURE — 36415 COLL VENOUS BLD VENIPUNCTURE: CPT

## 2022-10-07 ENCOUNTER — OFFICE VISIT (OUTPATIENT)
Dept: FAMILY MEDICINE | Facility: CLINIC | Age: 42
End: 2022-10-07
Payer: MEDICAID

## 2022-10-07 VITALS
HEART RATE: 68 BPM | TEMPERATURE: 97 F | WEIGHT: 266.38 LBS | SYSTOLIC BLOOD PRESSURE: 117 MMHG | DIASTOLIC BLOOD PRESSURE: 78 MMHG | RESPIRATION RATE: 20 BRPM | HEIGHT: 60 IN | OXYGEN SATURATION: 98 % | BODY MASS INDEX: 52.3 KG/M2

## 2022-10-07 DIAGNOSIS — Z12.31 BREAST CANCER SCREENING BY MAMMOGRAM: ICD-10-CM

## 2022-10-07 DIAGNOSIS — J45.909 ASTHMA, UNSPECIFIED ASTHMA SEVERITY, UNSPECIFIED WHETHER COMPLICATED, UNSPECIFIED WHETHER PERSISTENT: ICD-10-CM

## 2022-10-07 DIAGNOSIS — Z23 FLU VACCINE NEED: ICD-10-CM

## 2022-10-07 DIAGNOSIS — E61.1 DIETARY IRON DEFICIENCY: ICD-10-CM

## 2022-10-07 DIAGNOSIS — Z00.00 ROUTINE GENERAL MEDICAL EXAMINATION AT A HEALTH CARE FACILITY: Primary | ICD-10-CM

## 2022-10-07 DIAGNOSIS — I10 PRIMARY HYPERTENSION: ICD-10-CM

## 2022-10-07 PROCEDURE — 3074F PR MOST RECENT SYSTOLIC BLOOD PRESSURE < 130 MM HG: ICD-10-PCS | Mod: CPTII,,, | Performed by: FAMILY MEDICINE

## 2022-10-07 PROCEDURE — 3008F BODY MASS INDEX DOCD: CPT | Mod: CPTII,,, | Performed by: FAMILY MEDICINE

## 2022-10-07 PROCEDURE — 99396 PR PREVENTIVE VISIT,EST,40-64: ICD-10-PCS | Mod: 25,,, | Performed by: FAMILY MEDICINE

## 2022-10-07 PROCEDURE — 1159F MED LIST DOCD IN RCRD: CPT | Mod: CPTII,,, | Performed by: FAMILY MEDICINE

## 2022-10-07 PROCEDURE — 1160F PR REVIEW ALL MEDS BY PRESCRIBER/CLIN PHARMACIST DOCUMENTED: ICD-10-PCS | Mod: CPTII,,, | Performed by: FAMILY MEDICINE

## 2022-10-07 PROCEDURE — 3074F SYST BP LT 130 MM HG: CPT | Mod: CPTII,,, | Performed by: FAMILY MEDICINE

## 2022-10-07 PROCEDURE — 4010F ACE/ARB THERAPY RXD/TAKEN: CPT | Mod: CPTII,,, | Performed by: FAMILY MEDICINE

## 2022-10-07 PROCEDURE — 90471 FLU VACCINE (QUAD) GREATER THAN OR EQUAL TO 3YO PRESERVATIVE FREE IM: ICD-10-PCS | Mod: ,,, | Performed by: FAMILY MEDICINE

## 2022-10-07 PROCEDURE — 99396 PREV VISIT EST AGE 40-64: CPT | Mod: 25,,, | Performed by: FAMILY MEDICINE

## 2022-10-07 PROCEDURE — 1159F PR MEDICATION LIST DOCUMENTED IN MEDICAL RECORD: ICD-10-PCS | Mod: CPTII,,, | Performed by: FAMILY MEDICINE

## 2022-10-07 PROCEDURE — 3008F PR BODY MASS INDEX (BMI) DOCUMENTED: ICD-10-PCS | Mod: CPTII,,, | Performed by: FAMILY MEDICINE

## 2022-10-07 PROCEDURE — 90686 IIV4 VACC NO PRSV 0.5 ML IM: CPT | Mod: ,,, | Performed by: FAMILY MEDICINE

## 2022-10-07 PROCEDURE — 1160F RVW MEDS BY RX/DR IN RCRD: CPT | Mod: CPTII,,, | Performed by: FAMILY MEDICINE

## 2022-10-07 PROCEDURE — 90471 IMMUNIZATION ADMIN: CPT | Mod: ,,, | Performed by: FAMILY MEDICINE

## 2022-10-07 PROCEDURE — 3078F PR MOST RECENT DIASTOLIC BLOOD PRESSURE < 80 MM HG: ICD-10-PCS | Mod: CPTII,,, | Performed by: FAMILY MEDICINE

## 2022-10-07 PROCEDURE — 90686 FLU VACCINE (QUAD) GREATER THAN OR EQUAL TO 3YO PRESERVATIVE FREE IM: ICD-10-PCS | Mod: ,,, | Performed by: FAMILY MEDICINE

## 2022-10-07 PROCEDURE — 3078F DIAST BP <80 MM HG: CPT | Mod: CPTII,,, | Performed by: FAMILY MEDICINE

## 2022-10-07 PROCEDURE — 4010F PR ACE/ARB THEARPY RXD/TAKEN: ICD-10-PCS | Mod: CPTII,,, | Performed by: FAMILY MEDICINE

## 2022-10-07 NOTE — PROGRESS NOTES
Patient ID: [id number]     Chief Complaint: Annual Exam        HPI:     Mellissa Ann Milligan is a 41 y.o. female here today for an annual wellness visit. Reviewed and discussed lab results. She still reports sinus congestion.     ----------------------------  Asthma  Dietary iron deficiency  Essential (primary) hypertension  DOMINIQUE (iron deficiency anemia)  Leukocytosis  Morbid obesity  Nasal congestion  Obesity hypoventilation syndrome  Obstructive sleep apnea  JOSEPH on CPAP  Prediabetes     Past Surgical History:   Procedure Laterality Date     SECTION      CHOLECYSTECTOMY      COLONOSCOPY      EGD, WITH BALLOON DILATION      FESS, WITH DACRYOCYSTORHINOSTOMY      NASAL TURBINATE REDUCTION      SINUS SURGERY         Review of patient's allergies indicates:  No Known Allergies    Outpatient Medications Marked as Taking for the 10/7/22 encounter (Office Visit) with Sebastian Davila, DO   Medication Sig Dispense Refill    albuterol (PROVENTIL/VENTOLIN HFA) 90 mcg/actuation inhaler INHALE 2 PUFFS EVERY 4 HOURS AS NEEDED FOR WHEEZING 18 g 5    albuterol-ipratropium (DUO-NEB) 2.5 mg-0.5 mg/3 mL nebulizer solution INHALE 1 VIAL VIA NEBULIZER 4 TIMES A  mL 0    ferrous gluconate (FERGON) 324 MG tablet Take 1 tablet by mouth 2 (two) times a day.      fluticasone propionate (FLONASE) 50 mcg/actuation nasal spray 2 sprays by Each Nostril route once daily.      losartan (COZAAR) 50 MG tablet Take 1 tablet by mouth once daily.      pantoprazole (PROTONIX) 40 MG tablet Take 40 mg by mouth once daily.      SPIRIVA WITH HANDIHALER 18 mcg inhalation capsule Inhale 2 capsules into the lungs once daily.      SYMBICORT 160-4.5 mcg/actuation HFAA Inhale 2 puffs into the lungs 2 (two) times daily.         Social History     Socioeconomic History    Marital status:    Tobacco Use    Smoking status: Never    Smokeless tobacco: Never   Substance and Sexual Activity    Alcohol use: Never    Drug use: Never        Family  "History   Problem Relation Age of Onset    Diabetes Mother     COPD Mother         Patient Care Team:  Sebastian Davila DO as PCP - General (Family Medicine)       Subjective:     Review of Systems   Constitutional:  Negative for fever.   HENT:  Positive for congestion. Negative for sinus pain and sore throat.    Respiratory:  Positive for shortness of breath. Negative for cough and wheezing.    Cardiovascular:  Negative for chest pain.   Gastrointestinal:  Negative for abdominal pain, constipation, diarrhea, heartburn and nausea.     See HPI for details    All Other ROS: Negative except as stated in HPI.       Objective:     /78   Pulse 68   Temp 97.2 °F (36.2 °C) (Tympanic)   Resp 20   Ht 4' 11.84" (1.52 m)   Wt 120.8 kg (266 lb 6.4 oz)   LMP 10/04/2022 (Exact Date)   SpO2 98%   BMI 52.30 kg/m²     Physical Exam  Vitals reviewed.   Constitutional:       General: She is not in acute distress.     Appearance: She is obese.   HENT:      Right Ear: Tympanic membrane, ear canal and external ear normal.      Left Ear: Tympanic membrane, ear canal and external ear normal.      Mouth/Throat:      Mouth: Mucous membranes are moist.      Pharynx: Oropharynx is clear. No oropharyngeal exudate or posterior oropharyngeal erythema.   Cardiovascular:      Rate and Rhythm: Normal rate and regular rhythm.      Heart sounds: No murmur heard.    No friction rub. No gallop.   Pulmonary:      Effort: No respiratory distress.      Breath sounds: No wheezing, rhonchi or rales.   Musculoskeletal:         General: No swelling, tenderness or deformity.      Cervical back: No tenderness.      Right lower leg: No edema.      Left lower leg: No edema.   Lymphadenopathy:      Cervical: No cervical adenopathy.   Skin:     General: Skin is warm and dry.      Findings: No lesion or rash.   Neurological:      General: No focal deficit present.      Mental Status: She is alert.   Psychiatric:         Mood and Affect: Mood normal. "         Assessment:       ICD-10-CM ICD-9-CM   1. Routine general medical examination at a health care facility  Z00.00 V70.0   2. Flu vaccine need  Z23 V04.81   3. Breast cancer screening by mammogram  Z12.31 V76.12   4. Dietary iron deficiency  E61.1 269.8   5. Primary hypertension  I10 401.9   6. Asthma, unspecified asthma severity, unspecified whether complicated, unspecified whether persistent  J45.909 493.90        Plan:         Health Maintenance Topics with due status: Not Due       Topic Last Completion Date    TETANUS VACCINE 09/27/2020        Cervical Cancer Screening - Will check prior records for previous pap smear.     Breast Cancer Screening - Last Mammogram on 10/11/21 . Ordered, encouraged patient to complete before end of year.     Eye Exam - Recommend annually.    Dental Exam - Recommend biannual exams.     Vaccinations -   Immunization History   Administered Date(s) Administered    COVID-19, MRNA, LN-S, PF (Pfizer) (Purple Cap) 03/27/2021, 04/17/2021    Influenza - Quadrivalent 09/22/2020, 09/30/2021    Influenza - Quadrivalent - MDCK - PF 10/14/2019    Influenza - Quadrivalent - PF *Preferred* (6 months and older) 11/15/2002, 10/07/2022    Influenza - Trivalent - PF (ADULT) 11/15/2002, 10/01/2019    Pneumococcal Polysaccharide - 23 Valent 12/17/2019, 09/30/2021    Tdap 09/27/2020            1. Routine general medical examination at a health care facility    2. Flu vaccine need  - Influenza - Quadrivalent *Preferred* (6 months+) (PF)    3. Breast cancer screening by mammogram  - Mammo Digital Screening Bilat w/ [facility]; Future    4. Dietary iron deficiency  -recommended patient take morning iron supplement prior to other medications as the pantoprazole may interfere with absorption of the iron.   5. Primary hypertension  -well controlled on current medications  6. Asthma, unspecified asthma severity, unspecified whether complicated, unspecified whether persistent  -patient reports shortness of  breath but lungs sound clear on auscultation. Suspect sinus congestion is contributing to symptoms. Recommended Fluticasone and nasal saline spray      Medication List with Changes/Refills   Current Medications    ALBUTEROL (PROVENTIL/VENTOLIN HFA) 90 MCG/ACTUATION INHALER    INHALE 2 PUFFS EVERY 4 HOURS AS NEEDED FOR WHEEZING       Start Date: 9/26/2022 End Date: --    ALBUTEROL-IPRATROPIUM (DUO-NEB) 2.5 MG-0.5 MG/3 ML NEBULIZER SOLUTION    INHALE 1 VIAL VIA NEBULIZER 4 TIMES A DAY       Start Date: 7/19/2022 End Date: --    FERROUS GLUCONATE (FERGON) 324 MG TABLET    Take 1 tablet by mouth 2 (two) times a day.       Start Date: 4/21/2022 End Date: --    FLUTICASONE PROPIONATE (FLONASE) 50 MCG/ACTUATION NASAL SPRAY    2 sprays by Each Nostril route once daily.       Start Date: 9/6/2022  End Date: --    LOSARTAN (COZAAR) 50 MG TABLET    Take 1 tablet by mouth once daily.       Start Date: 1/31/2022 End Date: --    PANTOPRAZOLE (PROTONIX) 40 MG TABLET    Take 40 mg by mouth once daily.       Start Date: 9/30/2021 End Date: --    SPIRIVA WITH HANDIHALER 18 MCG INHALATION CAPSULE    Inhale 2 capsules into the lungs once daily.       Start Date: 5/9/2022  End Date: --    SYMBICORT 160-4.5 MCG/ACTUATION HFAA    Inhale 2 puffs into the lungs 2 (two) times daily.       Start Date: 4/1/2022  End Date: --          The patient's Health Maintenance was reviewed and the following appears to be due at this time:   Health Maintenance Due   Topic Date Due    Hepatitis C Screening  Never done    Cervical Cancer Screening  Never done    HIV Screening  Never done    COVID-19 Vaccine (3 - Booster for Pfizer series) 06/12/2021    Pneumococcal Vaccines (Age 0-64) (2 - PCV) 09/30/2022    Mammogram  10/11/2022         Follow up in about 6 months (around 4/7/2023) for Follow up. In addition to their scheduled follow up, the patient has also been instructed to follow up on as needed basis.

## 2022-10-12 ENCOUNTER — DOCUMENTATION ONLY (OUTPATIENT)
Dept: ADMINISTRATIVE | Facility: HOSPITAL | Age: 42
End: 2022-10-12
Payer: MEDICAID

## 2022-10-19 ENCOUNTER — TELEPHONE (OUTPATIENT)
Dept: FAMILY MEDICINE | Facility: CLINIC | Age: 42
End: 2022-10-19
Payer: MEDICAID

## 2022-10-20 ENCOUNTER — HOSPITAL ENCOUNTER (OUTPATIENT)
Dept: RADIOLOGY | Facility: HOSPITAL | Age: 42
Discharge: HOME | End: 2022-10-20
Attending: FAMILY MEDICINE
Payer: MEDICAID

## 2022-10-20 DIAGNOSIS — R09.81 SINUS CONGESTION: Primary | ICD-10-CM

## 2022-10-20 DIAGNOSIS — Z12.31 BREAST CANCER SCREENING BY MAMMOGRAM: ICD-10-CM

## 2022-10-20 PROCEDURE — 77067 SCR MAMMO BI INCL CAD: CPT | Mod: TC

## 2022-10-20 PROCEDURE — 77063 BREAST TOMOSYNTHESIS BI: CPT | Mod: 26,,, | Performed by: RADIOLOGY

## 2022-10-20 PROCEDURE — 77063 MAMMO DIGITAL SCREENING BILAT WITH TOMO: ICD-10-PCS | Mod: 26,,, | Performed by: RADIOLOGY

## 2022-10-20 PROCEDURE — 77067 SCR MAMMO BI INCL CAD: CPT | Mod: 26,,, | Performed by: RADIOLOGY

## 2022-10-20 PROCEDURE — 77067 MAMMO DIGITAL SCREENING BILAT WITH TOMO: ICD-10-PCS | Mod: 26,,, | Performed by: RADIOLOGY

## 2022-11-02 DIAGNOSIS — J45.909 ASTHMA, UNSPECIFIED ASTHMA SEVERITY, UNSPECIFIED WHETHER COMPLICATED, UNSPECIFIED WHETHER PERSISTENT: Primary | ICD-10-CM

## 2022-11-02 RX ORDER — TIOTROPIUM BROMIDE 18 UG/1
CAPSULE ORAL; RESPIRATORY (INHALATION)
Qty: 30 CAPSULE | Refills: 6 | Status: SHIPPED | OUTPATIENT
Start: 2022-11-02 | End: 2023-06-26

## 2022-11-09 ENCOUNTER — HOSPITAL ENCOUNTER (EMERGENCY)
Facility: HOSPITAL | Age: 42
Discharge: HOME | End: 2022-11-09
Attending: FAMILY MEDICINE
Payer: MEDICAID

## 2022-11-09 VITALS
HEART RATE: 85 BPM | WEIGHT: 216 LBS | HEIGHT: 59 IN | RESPIRATION RATE: 18 BRPM | BODY MASS INDEX: 43.55 KG/M2 | SYSTOLIC BLOOD PRESSURE: 162 MMHG | OXYGEN SATURATION: 96 % | TEMPERATURE: 98 F | DIASTOLIC BLOOD PRESSURE: 97 MMHG
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
## 2022-11-09 DIAGNOSIS — L08.9 INFECTED INSECT BITE OF FINGER, INITIAL ENCOUNTER: Primary | ICD-10-CM

## 2022-11-09 DIAGNOSIS — S60.469A INFECTED INSECT BITE OF FINGER, INITIAL ENCOUNTER: Primary | ICD-10-CM

## 2022-11-09 PROCEDURE — 99284 EMERGENCY DEPT VISIT MOD MDM: CPT

## 2022-11-09 RX ORDER — MUPIROCIN 20 MG/G
OINTMENT TOPICAL 3 TIMES DAILY
Qty: 22 G | Refills: 0 | Status: SHIPPED | OUTPATIENT
Start: 2022-11-09 | End: 2022-11-19

## 2022-11-09 RX ORDER — KETOROLAC TROMETHAMINE 10 MG/1
10 TABLET, FILM COATED ORAL EVERY 6 HOURS
Qty: 20 TABLET | Refills: 0 | Status: SHIPPED | OUTPATIENT
Start: 2022-11-09 | End: 2022-11-14

## 2022-11-09 RX ORDER — SULFAMETHOXAZOLE AND TRIMETHOPRIM 800; 160 MG/1; MG/1
1 TABLET ORAL 2 TIMES DAILY
Qty: 14 TABLET | Refills: 0 | Status: SHIPPED | OUTPATIENT
Start: 2022-11-09 | End: 2022-11-16

## 2022-11-09 NOTE — ED PROVIDER NOTES
Encounter Date: 2022       History     Chief Complaint   Patient presents with    Abscess     Right middle finger red, swollen area     This patient is a 42-year-old female who comes in with a red, swollen right middle finger.  She states that she was bit by an insect yesterday and she woke up this morning with the swelling and the redness.    The history is provided by the patient.   Abscess   This is a new problem. The current episode started yesterday. The problem occurs rarely. The problem has been gradually worsening. The abscess is present on the right fingers. The pain is at a severity of 6/10. The abscess is characterized by redness and painfulness. There were no sick contacts.   Review of patient's allergies indicates:  No Known Allergies  Past Medical History:   Diagnosis Date    Asthma     Dietary iron deficiency     Essential (primary) hypertension     DOMINIQUE (iron deficiency anemia)     Leukocytosis     Morbid obesity     Nasal congestion     Obesity hypoventilation syndrome     Obstructive sleep apnea     JOSEPH on CPAP     Prediabetes      Past Surgical History:   Procedure Laterality Date     SECTION      CHOLECYSTECTOMY      COLONOSCOPY      EGD, WITH BALLOON DILATION      FESS, WITH DACRYOCYSTORHINOSTOMY      NASAL TURBINATE REDUCTION      SINUS SURGERY       Family History   Problem Relation Age of Onset    Diabetes Mother     COPD Mother      Social History     Tobacco Use    Smoking status: Never    Smokeless tobacco: Never   Substance Use Topics    Alcohol use: Never    Drug use: Never     Review of Systems   Constitutional: Negative.    HENT: Negative.     Respiratory: Negative.     Cardiovascular: Negative.    Endocrine: Negative.    Skin:         Right middle finger pain, redness and swelling   Neurological: Negative.    Psychiatric/Behavioral: Negative.     All other systems reviewed and are negative.    Physical Exam     Initial Vitals [22 1036]   BP Pulse Resp Temp SpO2   (!)  162/97 85 18 98.3 °F (36.8 °C) 96 %      MAP       --         Physical Exam    Nursing note and vitals reviewed.  Constitutional: She appears well-developed.   HENT:   Head: Normocephalic.   Eyes: Pupils are equal, round, and reactive to light.   Neck:   Normal range of motion.  Cardiovascular:  Normal rate, regular rhythm and normal heart sounds.           Pulmonary/Chest: Breath sounds normal.   Abdominal: Abdomen is soft.   Musculoskeletal:         General: Normal range of motion.      Left hand: Normal.        Hands:       Cervical back: Normal range of motion.      Comments: This patient is a 42-year-old female, pain and redness and swelling right middle finger.  There is visible insect bite with mild induration and surrounding cellulitis and erythema     Neurological: She is alert and oriented to person, place, and time.   Skin: Skin is warm and dry.   Psychiatric: She has a normal mood and affect.       ED Course   Procedures  Labs Reviewed - No data to display       Imaging Results    None          Medications - No data to display  Medical Decision Making:   Initial Assessment:   42-year-old female with right middle finger swelling and redness  Differential Diagnosis:   Infected insect bite  ED Management:  Patient was given outpatient Bactrim, Bactroban and ketorolac                        Clinical Impression:   Final diagnoses:  [S60.469A, L08.9, W57.XXXA] Infected insect bite of finger, initial encounter (Primary)        ED Disposition Condition    Discharge Stable          ED Prescriptions       Medication Sig Dispense Start Date End Date Auth. Provider    mupirocin (BACTROBAN) 2 % ointment Apply topically 3 (three) times daily. for 10 days 22 g 11/9/2022 11/19/2022 Christa Huddleston MD    sulfamethoxazole-trimethoprim 800-160mg (BACTRIM DS) 800-160 mg Tab Take 1 tablet by mouth 2 (two) times daily. for 7 days 14 tablet 11/9/2022 11/16/2022 Christa Huddleston MD    ketorolac (TORADOL) 10  mg tablet Take 1 tablet (10 mg total) by mouth every 6 (six) hours. for 5 days 20 tablet 11/9/2022 11/14/2022 Christa Huddleston MD          Follow-up Information       Follow up With Specialties Details Why Contact Info    Sebastian Davila DO Family Medicine, Hospitalist Schedule an appointment as soon as possible for a visit   [address] [address]  [phone]               Christa Huddleston MD  11/09/22 1815

## 2022-11-09 NOTE — ED NOTES
Pt to Ed with c/o pain in 2nd finger on R hand. Swelling and redness noted, pt has limited ROM. Pulses noted distal to injury.. Pt denies any trauma.

## 2022-11-15 ENCOUNTER — OFFICE VISIT (OUTPATIENT)
Dept: FAMILY MEDICINE | Facility: CLINIC | Age: 42
End: 2022-11-15
Payer: MEDICAID

## 2022-11-15 ENCOUNTER — LAB VISIT (OUTPATIENT)
Dept: LAB | Facility: HOSPITAL | Age: 42
End: 2022-11-15
Attending: FAMILY MEDICINE
Payer: MEDICAID

## 2022-11-15 VITALS
BODY MASS INDEX: 53.59 KG/M2 | OXYGEN SATURATION: 98 % | RESPIRATION RATE: 20 BRPM | TEMPERATURE: 99 F | DIASTOLIC BLOOD PRESSURE: 74 MMHG | SYSTOLIC BLOOD PRESSURE: 136 MMHG | WEIGHT: 265.81 LBS | HEART RATE: 100 BPM | HEIGHT: 59 IN

## 2022-11-15 DIAGNOSIS — L02.511 ABSCESS OF FINGER OF RIGHT HAND: ICD-10-CM

## 2022-11-15 DIAGNOSIS — J45.909 ASTHMA, UNSPECIFIED ASTHMA SEVERITY, UNSPECIFIED WHETHER COMPLICATED, UNSPECIFIED WHETHER PERSISTENT: Primary | ICD-10-CM

## 2022-11-15 DIAGNOSIS — L02.511 ABSCESS OF FINGER OF RIGHT HAND: Primary | ICD-10-CM

## 2022-11-15 PROBLEM — D72.829 LEUKOCYTOSIS: Status: ACTIVE | Noted: 2022-11-15

## 2022-11-15 PROBLEM — R09.81 NASAL CONGESTION: Status: ACTIVE | Noted: 2022-11-15

## 2022-11-15 PROCEDURE — 3075F PR MOST RECENT SYSTOLIC BLOOD PRESS GE 130-139MM HG: ICD-10-PCS | Mod: CPTII,,, | Performed by: FAMILY MEDICINE

## 2022-11-15 PROCEDURE — 1159F MED LIST DOCD IN RCRD: CPT | Mod: CPTII,,, | Performed by: FAMILY MEDICINE

## 2022-11-15 PROCEDURE — 3078F DIAST BP <80 MM HG: CPT | Mod: CPTII,,, | Performed by: FAMILY MEDICINE

## 2022-11-15 PROCEDURE — 1160F RVW MEDS BY RX/DR IN RCRD: CPT | Mod: CPTII,,, | Performed by: FAMILY MEDICINE

## 2022-11-15 PROCEDURE — 99214 PR OFFICE/OUTPT VISIT, EST, LEVL IV, 30-39 MIN: ICD-10-PCS | Mod: ,,, | Performed by: FAMILY MEDICINE

## 2022-11-15 PROCEDURE — 3008F PR BODY MASS INDEX (BMI) DOCUMENTED: ICD-10-PCS | Mod: CPTII,,, | Performed by: FAMILY MEDICINE

## 2022-11-15 PROCEDURE — 99214 OFFICE O/P EST MOD 30 MIN: CPT | Mod: ,,, | Performed by: FAMILY MEDICINE

## 2022-11-15 PROCEDURE — 1160F PR REVIEW ALL MEDS BY PRESCRIBER/CLIN PHARMACIST DOCUMENTED: ICD-10-PCS | Mod: CPTII,,, | Performed by: FAMILY MEDICINE

## 2022-11-15 PROCEDURE — 87070 CULTURE OTHR SPECIMN AEROBIC: CPT

## 2022-11-15 PROCEDURE — 4010F PR ACE/ARB THEARPY RXD/TAKEN: ICD-10-PCS | Mod: CPTII,,, | Performed by: FAMILY MEDICINE

## 2022-11-15 PROCEDURE — 1159F PR MEDICATION LIST DOCUMENTED IN MEDICAL RECORD: ICD-10-PCS | Mod: CPTII,,, | Performed by: FAMILY MEDICINE

## 2022-11-15 PROCEDURE — 3008F BODY MASS INDEX DOCD: CPT | Mod: CPTII,,, | Performed by: FAMILY MEDICINE

## 2022-11-15 PROCEDURE — 3078F PR MOST RECENT DIASTOLIC BLOOD PRESSURE < 80 MM HG: ICD-10-PCS | Mod: CPTII,,, | Performed by: FAMILY MEDICINE

## 2022-11-15 PROCEDURE — 4010F ACE/ARB THERAPY RXD/TAKEN: CPT | Mod: CPTII,,, | Performed by: FAMILY MEDICINE

## 2022-11-15 PROCEDURE — 3075F SYST BP GE 130 - 139MM HG: CPT | Mod: CPTII,,, | Performed by: FAMILY MEDICINE

## 2022-11-15 RX ORDER — AMOXICILLIN AND CLAVULANATE POTASSIUM 875; 125 MG/1; MG/1
1 TABLET, FILM COATED ORAL 2 TIMES DAILY
COMMUNITY
Start: 2022-11-14 | End: 2023-04-06

## 2022-11-15 RX ORDER — IPRATROPIUM BROMIDE AND ALBUTEROL SULFATE 2.5; .5 MG/3ML; MG/3ML
3 SOLUTION RESPIRATORY (INHALATION) 4 TIMES DAILY
Qty: 180 ML | Refills: 5 | Status: SHIPPED | OUTPATIENT
Start: 2022-11-15 | End: 2023-01-17 | Stop reason: SDUPTHER

## 2022-11-15 NOTE — PROGRESS NOTES
Patient ID: [id number]     Chief Complaint: Wound Infection (finger)        HPI:     Mellissa Ann Milligan is a 42 y.o. female here today for Wound Infection (finger). Patient does not recall what exactly bit her finger.Went to ER last Wednesday (22). The bite occurred on 22. Was given Bactrim and mupirocin by ER. Recently saw ENT for chronic sinus congestion and was started on an antibiotic by them also and started that antibiotic on 22.         ----------------------------  Asthma  Chronic sinusitis, unspecified  Dietary iron deficiency  Essential (primary) hypertension  Eustachian tube dysfunction, bilateral  DOMINIQUE (iron deficiency anemia)  Leukocytosis  Morbid obesity  Nasal congestion  Nasal polyps  Obesity hypoventilation syndrome  Obstructive sleep apnea  JOSEPH on CPAP  Prediabetes     Past Surgical History:   Procedure Laterality Date     SECTION      CHOLECYSTECTOMY      COLONOSCOPY      EGD, WITH BALLOON DILATION      FESS, WITH DACRYOCYSTORHINOSTOMY      NASAL TURBINATE REDUCTION      SINUS SURGERY         Review of patient's allergies indicates:  No Known Allergies    Outpatient Medications Marked as Taking for the 11/15/22 encounter (Office Visit) with Sebastian Davila, DO   Medication Sig Dispense Refill    albuterol (PROVENTIL/VENTOLIN HFA) 90 mcg/actuation inhaler INHALE 2 PUFFS EVERY 4 HOURS AS NEEDED FOR WHEEZING 18 g 5    albuterol-ipratropium (DUO-NEB) 2.5 mg-0.5 mg/3 mL nebulizer solution Take 3 mLs by nebulization 4 (four) times daily. Rescue 180 mL 5    amoxicillin-clavulanate 875-125mg (AUGMENTIN) 875-125 mg per tablet Take 1 tablet by mouth 2 (two) times daily.      ferrous gluconate (FERGON) 324 MG tablet Take 1 tablet by mouth 2 (two) times a day.      fluticasone propionate (FLONASE) 50 mcg/actuation nasal spray 2 sprays by Each Nostril route once daily.      losartan (COZAAR) 50 MG tablet Take 1 tablet by mouth once daily.      mupirocin (BACTROBAN) 2 % ointment  "Apply topically 3 (three) times daily. for 10 days 22 g 0    pantoprazole (PROTONIX) 40 MG tablet Take 40 mg by mouth once daily.      SPIRIVA WITH HANDIHALER 18 mcg inhalation capsule INHALE 2 INHALATIONS OF ONE CAPSULE ONCE DAILY 30 capsule 6    sulfamethoxazole-trimethoprim 800-160mg (BACTRIM DS) 800-160 mg Tab Take 1 tablet by mouth 2 (two) times daily. for 7 days 14 tablet 0    SYMBICORT 160-4.5 mcg/actuation HFAA Inhale 2 puffs into the lungs 2 (two) times daily.         Social History     Socioeconomic History    Marital status:    Tobacco Use    Smoking status: Never    Smokeless tobacco: Never   Substance and Sexual Activity    Alcohol use: Never    Drug use: Never        Family History   Problem Relation Age of Onset    Diabetes Mother     COPD Mother         Patient Care Team:  Sebastian Davila DO as PCP - General (Family Medicine)  Thee Raymond MD as Consulting Physician (Otolaryngology)     Subjective:     Review of Systems   Constitutional:  Negative for chills and fever.   Cardiovascular:  Negative for chest pain.   Gastrointestinal:  Negative for abdominal pain and diarrhea.   Skin:         Abscess on right middle finger.      See HPI for details  All Other ROS: Negative except as stated in HPI.       Objective:     /74   Pulse 100   Temp 98.8 °F (37.1 °C) (Tympanic)   Resp 20   Ht 4' 10.66" (1.49 m)   Wt 120.6 kg (265 lb 12.8 oz)   LMP 11/06/2022   SpO2 98%   BMI 54.31 kg/m²     Physical Exam  Vitals reviewed.   Constitutional:       General: She is not in acute distress.     Appearance: Normal appearance.   Cardiovascular:      Rate and Rhythm: Normal rate and regular rhythm.      Heart sounds: No murmur heard.    No friction rub. No gallop.   Pulmonary:      Effort: No respiratory distress.      Breath sounds: No wheezing, rhonchi or rales.   Musculoskeletal:         General: No swelling, tenderness or deformity.      Right lower leg: No edema.      Left lower leg: " No edema.   Skin:     General: Skin is warm and dry.      Findings: Lesion present. No rash.      Comments: Abscess of right middle finger with surrounding erythema. Located at proximal phalange. Overlying skin breakdown.    Neurological:      General: No focal deficit present.      Mental Status: She is alert.   Psychiatric:         Mood and Affect: Mood normal.       Assessment/Plan:     1. Abscess of finger of right hand  -     Ambulatory referral/consult to General Surgery; Future; Expected date: 11/22/2022    -Expressed purulent fluid from abscess and sent for culture. Advised patient to continue antibiotics as previously prescribed. Report to ER if infection spreads proximately or if she begins to lose sensation in finger. Will refer to General surgery for further evaluation due to location and resistance to initial antibiotic treatment. Concern for possible tendon involvement.   Follow up:     Follow up if symptoms worsen or fail to improve. In addition to their scheduled follow up, the patient has also been instructed to follow up on as needed basis.

## 2022-11-18 LAB — BACTERIA SPEC CULT: ABNORMAL

## 2022-11-18 RX ORDER — SULFAMETHOXAZOLE AND TRIMETHOPRIM 800; 160 MG/1; MG/1
1 TABLET ORAL 2 TIMES DAILY
Qty: 20 TABLET | Refills: 0 | Status: SHIPPED | OUTPATIENT
Start: 2022-11-18 | End: 2022-11-28

## 2023-01-17 DIAGNOSIS — J45.909 ASTHMA, UNSPECIFIED ASTHMA SEVERITY, UNSPECIFIED WHETHER COMPLICATED, UNSPECIFIED WHETHER PERSISTENT: ICD-10-CM

## 2023-01-17 RX ORDER — IPRATROPIUM BROMIDE AND ALBUTEROL SULFATE 2.5; .5 MG/3ML; MG/3ML
3 SOLUTION RESPIRATORY (INHALATION) 4 TIMES DAILY
Qty: 180 ML | Refills: 5 | Status: SHIPPED | OUTPATIENT
Start: 2023-01-17 | End: 2023-11-06

## 2023-02-01 DIAGNOSIS — J45.909 ASTHMA, UNSPECIFIED ASTHMA SEVERITY, UNSPECIFIED WHETHER COMPLICATED, UNSPECIFIED WHETHER PERSISTENT: Primary | ICD-10-CM

## 2023-02-02 ENCOUNTER — TELEPHONE (OUTPATIENT)
Dept: FAMILY MEDICINE | Facility: CLINIC | Age: 43
End: 2023-02-02
Payer: MEDICAID

## 2023-02-02 DIAGNOSIS — I10 HYPERTENSION, UNSPECIFIED TYPE: Primary | ICD-10-CM

## 2023-02-02 RX ORDER — LOSARTAN POTASSIUM 50 MG/1
50 TABLET ORAL DAILY
Qty: 30 TABLET | Refills: 5 | Status: SHIPPED | OUTPATIENT
Start: 2023-02-02 | End: 2023-07-24

## 2023-02-02 NOTE — TELEPHONE ENCOUNTER
----- Message from Waleska Obrien sent at 2/2/2023 11:15 AM CST -----  Regarding: REFILL  [facility]  losartan (COZAAR) 50 MG tablet

## 2023-04-06 ENCOUNTER — DOCUMENTATION ONLY (OUTPATIENT)
Dept: FAMILY MEDICINE | Facility: CLINIC | Age: 43
End: 2023-04-06

## 2023-04-06 ENCOUNTER — OFFICE VISIT (OUTPATIENT)
Dept: FAMILY MEDICINE | Facility: CLINIC | Age: 43
End: 2023-04-06
Payer: MEDICAID

## 2023-04-06 VITALS
TEMPERATURE: 97 F | HEART RATE: 80 BPM | SYSTOLIC BLOOD PRESSURE: 123 MMHG | WEIGHT: 272 LBS | BODY MASS INDEX: 54.84 KG/M2 | DIASTOLIC BLOOD PRESSURE: 79 MMHG | RESPIRATION RATE: 20 BRPM | HEIGHT: 59 IN | OXYGEN SATURATION: 98 %

## 2023-04-06 DIAGNOSIS — J45.50 SEVERE PERSISTENT ASTHMA WITHOUT COMPLICATION: Chronic | ICD-10-CM

## 2023-04-06 DIAGNOSIS — I10 PRIMARY HYPERTENSION: Primary | Chronic | ICD-10-CM

## 2023-04-06 PROBLEM — L02.511 ABSCESS OF FINGER OF RIGHT HAND: Status: ACTIVE | Noted: 2023-04-06

## 2023-04-06 PROCEDURE — 3078F DIAST BP <80 MM HG: CPT | Mod: CPTII,,, | Performed by: FAMILY MEDICINE

## 2023-04-06 PROCEDURE — 1160F RVW MEDS BY RX/DR IN RCRD: CPT | Mod: CPTII,,, | Performed by: FAMILY MEDICINE

## 2023-04-06 PROCEDURE — 99214 OFFICE O/P EST MOD 30 MIN: CPT | Mod: ,,, | Performed by: FAMILY MEDICINE

## 2023-04-06 PROCEDURE — 1159F PR MEDICATION LIST DOCUMENTED IN MEDICAL RECORD: ICD-10-PCS | Mod: CPTII,,, | Performed by: FAMILY MEDICINE

## 2023-04-06 PROCEDURE — 3078F PR MOST RECENT DIASTOLIC BLOOD PRESSURE < 80 MM HG: ICD-10-PCS | Mod: CPTII,,, | Performed by: FAMILY MEDICINE

## 2023-04-06 PROCEDURE — 99214 PR OFFICE/OUTPT VISIT, EST, LEVL IV, 30-39 MIN: ICD-10-PCS | Mod: ,,, | Performed by: FAMILY MEDICINE

## 2023-04-06 PROCEDURE — 1159F MED LIST DOCD IN RCRD: CPT | Mod: CPTII,,, | Performed by: FAMILY MEDICINE

## 2023-04-06 PROCEDURE — 1160F PR REVIEW ALL MEDS BY PRESCRIBER/CLIN PHARMACIST DOCUMENTED: ICD-10-PCS | Mod: CPTII,,, | Performed by: FAMILY MEDICINE

## 2023-04-06 PROCEDURE — 4010F ACE/ARB THERAPY RXD/TAKEN: CPT | Mod: CPTII,,, | Performed by: FAMILY MEDICINE

## 2023-04-06 PROCEDURE — 4010F PR ACE/ARB THEARPY RXD/TAKEN: ICD-10-PCS | Mod: CPTII,,, | Performed by: FAMILY MEDICINE

## 2023-04-06 PROCEDURE — 3074F SYST BP LT 130 MM HG: CPT | Mod: CPTII,,, | Performed by: FAMILY MEDICINE

## 2023-04-06 PROCEDURE — 3008F PR BODY MASS INDEX (BMI) DOCUMENTED: ICD-10-PCS | Mod: CPTII,,, | Performed by: FAMILY MEDICINE

## 2023-04-06 PROCEDURE — 3008F BODY MASS INDEX DOCD: CPT | Mod: CPTII,,, | Performed by: FAMILY MEDICINE

## 2023-04-06 PROCEDURE — 3074F PR MOST RECENT SYSTOLIC BLOOD PRESSURE < 130 MM HG: ICD-10-PCS | Mod: CPTII,,, | Performed by: FAMILY MEDICINE

## 2023-04-06 NOTE — PROGRESS NOTES
Patient ID: [id number]     Chief Complaint: Follow-up        HPI:     Mellissa Ann Milligan is a 42 y.o. female here today for Follow-up No other complaints today.         ----------------------------  Asthma  Chronic sinusitis, unspecified  Dietary iron deficiency  Essential (primary) hypertension  Eustachian tube dysfunction, bilateral  DOMINIQUE (iron deficiency anemia)  Leukocytosis  Morbid obesity  Nasal congestion  Nasal polyps  Obesity hypoventilation syndrome  Obstructive sleep apnea  JOSEPH on CPAP  Prediabetes     Past Surgical History:   Procedure Laterality Date     SECTION      CHOLECYSTECTOMY      COLONOSCOPY  07/15/2021    Dr Ricky MCDANIEL, WITH BALLOON DILATION      FESS, WITH DACRYOCYSTORHINOSTOMY      NASAL TURBINATE REDUCTION      SINUS SURGERY         Review of patient's allergies indicates:  No Known Allergies    Outpatient Medications Marked as Taking for the 23 encounter (Office Visit) with Sebastian Davila, DO   Medication Sig Dispense Refill    albuterol (PROVENTIL/VENTOLIN HFA) 90 mcg/actuation inhaler INHALE 2 PUFFS EVERY 4 HOURS AS NEEDED FOR WHEEZING 18 g 5    albuterol-ipratropium (DUO-NEB) 2.5 mg-0.5 mg/3 mL nebulizer solution Take 3 mLs by nebulization 4 (four) times daily. Rescue 180 mL 5    ferrous gluconate (FERGON) 324 MG tablet TAKE 1 TABLET BY MOUTH TWICE A DAY 60 tablet 5    fluticasone propionate (FLONASE) 50 mcg/actuation nasal spray 2 sprays by Each Nostril route once daily.      losartan (COZAAR) 50 MG tablet Take 1 tablet (50 mg total) by mouth once daily. 30 tablet 5    nebulizer accessories Kit 1 kit by Misc.(Non-Drug; Combo Route) route as needed. 1 kit 0    pantoprazole (PROTONIX) 40 MG tablet Take 40 mg by mouth once daily.      SPIRIVA WITH HANDIHALER 18 mcg inhalation capsule INHALE 2 INHALATIONS OF ONE CAPSULE ONCE DAILY 30 capsule 6    SYMBICORT 160-4.5 mcg/actuation HFAA Inhale 2 puffs into the lungs 2 (two) times daily.         Social History  "    Socioeconomic History    Marital status:    Tobacco Use    Smoking status: Never    Smokeless tobacco: Never   Substance and Sexual Activity    Alcohol use: Never    Drug use: Never        Family History   Problem Relation Age of Onset    Diabetes Mother     COPD Mother         Patient Care Team:  Sebastian Davila DO as PCP - General (Family Medicine)  Thee Raymond MD as Consulting Physician (Otolaryngology)  Ricky Hale MD as Consulting Physician (Gastroenterology)     Subjective:     Review of Systems   Constitutional:  Negative for chills and fever.   HENT:  Negative for congestion.    Respiratory:  Positive for shortness of breath. Negative for cough and wheezing.    Cardiovascular:  Negative for chest pain.   Gastrointestinal:  Negative for constipation and diarrhea.   Neurological:  Negative for dizziness and headaches.     See HPI for details  All Other ROS: Negative except as stated in HPI.       Objective:     /79   Pulse 80   Temp 97.3 °F (36.3 °C) (Tympanic)   Resp 20   Ht 4' 10.66" (1.49 m)   Wt 123.4 kg (272 lb)   LMP 03/05/2023   SpO2 98%   BMI 55.57 kg/m²     Physical Exam  Vitals reviewed.   Constitutional:       General: She is not in acute distress.     Appearance: Normal appearance.   Cardiovascular:      Rate and Rhythm: Normal rate and regular rhythm.      Heart sounds: No murmur heard.    No friction rub. No gallop.   Pulmonary:      Effort: No respiratory distress.      Breath sounds: No wheezing, rhonchi or rales.   Musculoskeletal:         General: No swelling, tenderness or deformity.      Right lower leg: No edema.      Left lower leg: No edema.   Skin:     General: Skin is warm and dry.      Findings: No lesion or rash.   Neurological:      General: No focal deficit present.      Mental Status: She is alert.   Psychiatric:         Mood and Affect: Mood normal.       Assessment/Plan:     1. Primary hypertension  well controlled on current prescription " medication    2. Severe persistent asthma without complication  -     Ambulatory referral/consult to Pulmonology; Future; Expected date: 04/06/2023          Follow up:     Follow up in about 6 months (around 10/6/2023) for Wellness. In addition to their scheduled follow up, the patient has also been instructed to follow up on as needed basis.

## 2023-04-22 DIAGNOSIS — J45.50 SEVERE PERSISTENT ASTHMA WITHOUT COMPLICATION: Primary | ICD-10-CM

## 2023-04-24 RX ORDER — BUDESONIDE AND FORMOTEROL FUMARATE DIHYDRATE 160; 4.5 UG/1; UG/1
AEROSOL RESPIRATORY (INHALATION)
Qty: 10.2 G | Refills: 5 | Status: SHIPPED | OUTPATIENT
Start: 2023-04-24 | End: 2023-10-23

## 2023-04-28 ENCOUNTER — HOSPITAL ENCOUNTER (EMERGENCY)
Facility: HOSPITAL | Age: 43
Discharge: HOME | End: 2023-04-28
Attending: STUDENT IN AN ORGANIZED HEALTH CARE EDUCATION/TRAINING PROGRAM
Payer: MEDICAID

## 2023-04-28 VITALS
SYSTOLIC BLOOD PRESSURE: 137 MMHG | HEART RATE: 74 BPM | BODY MASS INDEX: 52.41 KG/M2 | HEIGHT: 59 IN | TEMPERATURE: 98 F | DIASTOLIC BLOOD PRESSURE: 94 MMHG | RESPIRATION RATE: 18 BRPM | WEIGHT: 260 LBS | OXYGEN SATURATION: 96 %

## 2023-04-28 DIAGNOSIS — L03.114 CELLULITIS OF LEFT UPPER EXTREMITY: ICD-10-CM

## 2023-04-28 DIAGNOSIS — Z76.89 ENCOUNTER FOR INCISION AND DRAINAGE PROCEDURE: Primary | ICD-10-CM

## 2023-04-28 DIAGNOSIS — L02.91 ABSCESS: ICD-10-CM

## 2023-04-28 LAB
ALBUMIN SERPL-MCNC: 3.4 G/DL (ref 3.5–5)
ALBUMIN/GLOB SERPL: 0.9 RATIO (ref 1.1–2)
ALP SERPL-CCNC: 87 UNIT/L (ref 40–150)
ALT SERPL-CCNC: 28 UNIT/L (ref 0–55)
AST SERPL-CCNC: 20 UNIT/L (ref 5–34)
BASOPHILS # BLD AUTO: 0.03 X10(3)/MCL (ref 0–0.2)
BASOPHILS NFR BLD AUTO: 0.3 %
BILIRUBIN DIRECT+TOT PNL SERPL-MCNC: 0.3 MG/DL
BUN SERPL-MCNC: 11 MG/DL (ref 7–18.7)
CALCIUM SERPL-MCNC: 9.4 MG/DL (ref 8.4–10.2)
CHLORIDE SERPL-SCNC: 107 MMOL/L (ref 98–107)
CO2 SERPL-SCNC: 28 MMOL/L (ref 22–29)
CREAT SERPL-MCNC: 0.74 MG/DL (ref 0.55–1.02)
EOSINOPHIL # BLD AUTO: 0.24 X10(3)/MCL (ref 0–0.9)
EOSINOPHIL NFR BLD AUTO: 2.1 %
ERYTHROCYTE [DISTWIDTH] IN BLOOD BY AUTOMATED COUNT: 14.6 % (ref 11.5–17)
GFR SERPLBLD CREATININE-BSD FMLA CKD-EPI: >60 MLS/MIN/1.73/M2
GLOBULIN SER-MCNC: 3.9 GM/DL (ref 2.4–3.5)
GLUCOSE SERPL-MCNC: 105 MG/DL (ref 74–100)
HCT VFR BLD AUTO: 41.5 % (ref 37–47)
HGB BLD-MCNC: 13.1 G/DL (ref 12–16)
IMM GRANULOCYTES # BLD AUTO: 0.04 X10(3)/MCL (ref 0–0.04)
IMM GRANULOCYTES NFR BLD AUTO: 0.3 %
LYMPHOCYTES # BLD AUTO: 2.22 X10(3)/MCL (ref 0.6–4.6)
LYMPHOCYTES NFR BLD AUTO: 19.4 %
MCH RBC QN AUTO: 25.9 PG (ref 27–31)
MCHC RBC AUTO-ENTMCNC: 31.6 G/DL (ref 33–36)
MCV RBC AUTO: 82 FL (ref 80–94)
MONOCYTES # BLD AUTO: 0.48 X10(3)/MCL (ref 0.1–1.3)
MONOCYTES NFR BLD AUTO: 4.2 %
NEUTROPHILS # BLD AUTO: 8.46 X10(3)/MCL (ref 2.1–9.2)
NEUTROPHILS NFR BLD AUTO: 73.7 %
NRBC BLD AUTO-RTO: 0 %
PLATELET # BLD AUTO: 256 X10(3)/MCL (ref 130–400)
PMV BLD AUTO: 8.9 FL (ref 7.4–10.4)
POTASSIUM SERPL-SCNC: 4.1 MMOL/L (ref 3.5–5.1)
PROT SERPL-MCNC: 7.3 GM/DL (ref 6.4–8.3)
RBC # BLD AUTO: 5.06 X10(6)/MCL (ref 4.2–5.4)
SODIUM SERPL-SCNC: 144 MMOL/L (ref 136–145)
WBC # SPEC AUTO: 11.5 X10(3)/MCL (ref 4.5–11.5)

## 2023-04-28 PROCEDURE — 80053 COMPREHEN METABOLIC PANEL: CPT | Performed by: STUDENT IN AN ORGANIZED HEALTH CARE EDUCATION/TRAINING PROGRAM

## 2023-04-28 PROCEDURE — 85025 COMPLETE CBC W/AUTO DIFF WBC: CPT | Performed by: STUDENT IN AN ORGANIZED HEALTH CARE EDUCATION/TRAINING PROGRAM

## 2023-04-28 PROCEDURE — 10061 I&D ABSCESS COMP/MULTIPLE: CPT

## 2023-04-28 PROCEDURE — 99283 EMERGENCY DEPT VISIT LOW MDM: CPT | Mod: 25

## 2023-04-28 PROCEDURE — 25000003 PHARM REV CODE 250: Performed by: STUDENT IN AN ORGANIZED HEALTH CARE EDUCATION/TRAINING PROGRAM

## 2023-04-28 RX ORDER — BACITRACIN ZINC 500 UNIT/G
1 OINTMENT (GRAM) TOPICAL
Status: DISCONTINUED | OUTPATIENT
Start: 2023-04-28 | End: 2023-04-28 | Stop reason: HOSPADM

## 2023-04-28 RX ORDER — SULFAMETHOXAZOLE AND TRIMETHOPRIM 800; 160 MG/1; MG/1
1 TABLET ORAL 2 TIMES DAILY
Qty: 14 TABLET | Refills: 0 | Status: SHIPPED | OUTPATIENT
Start: 2023-04-28 | End: 2023-05-05

## 2023-04-28 RX ORDER — SULFAMETHOXAZOLE AND TRIMETHOPRIM 800; 160 MG/1; MG/1
1 TABLET ORAL
Status: COMPLETED | OUTPATIENT
Start: 2023-04-28 | End: 2023-04-28

## 2023-04-28 RX ORDER — HYDROCODONE BITARTRATE AND ACETAMINOPHEN 5; 325 MG/1; MG/1
1 TABLET ORAL
Status: COMPLETED | OUTPATIENT
Start: 2023-04-28 | End: 2023-04-28

## 2023-04-28 RX ADMIN — SULFAMETHOXAZOLE AND TRIMETHOPRIM 1 TABLET: 800; 160 TABLET ORAL at 07:04

## 2023-04-28 RX ADMIN — HYDROCODONE BITARTRATE AND ACETAMINOPHEN 1 TABLET: 5; 325 TABLET ORAL at 08:04

## 2023-04-28 RX ADMIN — Medication 3 ML: at 07:04

## 2023-04-29 NOTE — DISCHARGE INSTRUCTIONS
Please follow-up with the primary care physician within next 2 days for wound recheck.  If you have any new or concerning symptoms as discussed, please return to the emergency department immediately.

## 2023-04-29 NOTE — ED PROVIDER NOTES
Encounter Date: 2023       History     Chief Complaint   Patient presents with    Abscess     Abscess x2 to left forearm     Patient is a 42-year-old female with past medical history of asthma, dietary iron deficiency, primary hypertension, iron deficiency anemia, morbid obesity, JOSEPH, and prediabetes that presents due to abscesses x2 has been no the patient's left distal forearm as well as left proximal forearm.  Said he has been present over the past week.  Denies any spontaneous drainage.  She does state that she has a history of MRSA infection in the past without significant complication.  Denies any fevers or chills.  No reported nausea or vomiting.  No abdominal pain.  Denies any specific close sick contacts.  She said that she does have dogs and cats at home but denied any scratching to the area.  ROS otherwise negative.      Review of patient's allergies indicates:  No Known Allergies  Past Medical History:   Diagnosis Date    Asthma     Chronic sinusitis, unspecified     Dietary iron deficiency     Essential (primary) hypertension     Eustachian tube dysfunction, bilateral     DOMINIQUE (iron deficiency anemia)     Leukocytosis     Morbid obesity     Nasal congestion     Nasal polyps     Obesity hypoventilation syndrome     Obstructive sleep apnea     JOSEPH on CPAP     Prediabetes      Past Surgical History:   Procedure Laterality Date     SECTION      CHOLECYSTECTOMY      COLONOSCOPY  07/15/2021    Dr Ricky Hale    EGD, WITH BALLOON DILATION      FESS, WITH DACRYOCYSTORHINOSTOMY      NASAL TURBINATE REDUCTION      SINUS SURGERY       Family History   Problem Relation Age of Onset    Diabetes Mother     COPD Mother      Social History     Tobacco Use    Smoking status: Never    Smokeless tobacco: Never   Substance Use Topics    Alcohol use: Never    Drug use: Never     Review of Systems   Constitutional:  Negative for fever.   Musculoskeletal:  Negative for arthralgias.   Skin:  Positive for color  change and wound. Negative for rash.   All other systems reviewed and are negative.    Physical Exam     Initial Vitals [04/28/23 1849]   BP Pulse Resp Temp SpO2   (!) 170/85 73 18 98.4 °F (36.9 °C) 96 %      MAP       --         Physical Exam    Constitutional: She appears well-developed and well-nourished. She is not diaphoretic. No distress.   HENT:   Head: Normocephalic and atraumatic.   Eyes: Conjunctivae are normal. Pupils are equal, round, and reactive to light.   Neck: Neck supple.   Normal range of motion.  Cardiovascular:  Normal rate and regular rhythm.           Pulmonary/Chest: Breath sounds normal. No respiratory distress.   Abdominal: Abdomen is soft. She exhibits no distension. There is no abdominal tenderness.   Musculoskeletal:         General: Normal range of motion.      Cervical back: Normal range of motion and neck supple.     Neurological: She is alert and oriented to person, place, and time.   Skin: Capillary refill takes less than 2 seconds. Abscess noted. No rash noted. There is erythema.   Abscess x2 to left forearm.  Notable for erythema with induration.  Does not appear to have spontaneous drainage discharge at this time.  Does not track deeper into forearm compartment spaces.  Patient has full range of motion at the joints without any joint tenderness.  2+ pulse radial.  No sensory or motor deficits.       ED Course   I & D - Incision and Drainage    Date/Time: 4/28/2023 7:38 PM  Location procedure was performed: [facility] EMERGENCY DEPARTMENT  Performed by: Kieran Griffiths MD  Authorized by: Kieran Griffiths MD   Assisting provider: Kieran Griffiths MD  Pre-operative diagnosis: cutaneous abscess  Post-operative diagnosis: cutaneous abscess s/p I&D  Consent Done: Yes  Consent: Verbal consent obtained.  Risks and benefits: risks, benefits and alternatives were discussed  Consent given by: patient  Required items: required blood products, implants, devices, and special  equipment available  Type: abscess  Body area: upper extremity  Location details: left arm  Anesthesia: see MAR for details    Anesthesia:  Local Anesthetic: LET (lido,epi,tetracaine)  Anesthetic total: 2 mL    Patient sedated: no  Description of findings: purulent drainage from distal L forearm abscess   Scalpel size: 11  Incision type: single straight  Incision depth: subcutaneous and dermal  Complexity: complex  Drainage: pus and bloody  Drainage amount: moderate  Wound treatment: incision, wound left open, drainage and deloculation  Technical procedures used: I and D with deloculation with hemostats  Significant surgical tasks conducted by the assistant(s): none  Complications: No  Estimated blood loss (mL): 0.5  Specimens: No  Implants: No  Patient tolerance: Patient tolerated the procedure well with no immediate complications    Incision depth: subcutaneous and dermal      I & D - Incision and Drainage    Date/Time: 4/29/2023 12:54 AM  Location procedure was performed: [facility] EMERGENCY DEPARTMENT  Performed by: Kieran Griffiths MD  Authorized by: Kieran Griffiths MD   Assisting provider: Kieran Griffiths MD  Pre-operative diagnosis: cutaneous abscess  Post-operative diagnosis: cutaneous abscess s/p I&D  Consent Done: Yes  Consent: Verbal consent obtained.  Risks and benefits: risks, benefits and alternatives were discussed  Consent given by: patient  Required items: required blood products, implants, devices, and special equipment available  Type: abscess  Body area: upper extremity  Location details: left arm  Anesthesia: see MAR for details    Anesthesia:  Local Anesthetic: LET (lido,epi,tetracaine)  Anesthetic total: 2 mL    Patient sedated: no  Description of findings: sanguinous/purulent drainage from proximal L forearm abscess   Scalpel size: 11  Incision type: single straight  Incision depth: subcutaneous and dermal  Complexity: complex  Drainage: pus and bloody  Drainage amount:  scant  Wound treatment: incision, drainage, wound left open, deloculation and expression of material  Packing material: none  Technical procedures used: I and D with deloculation with hemostats  Significant surgical tasks conducted by the assistant(s): none  Complications: No  Estimated blood loss (mL): 2  Specimens: No  Implants: No  Patient tolerance: Patient tolerated the procedure well with no immediate complications    Incision depth: subcutaneous and dermal      Labs Reviewed   COMPREHENSIVE METABOLIC PANEL - Abnormal; Notable for the following components:       Result Value    Glucose Level 105 (*)     Albumin Level 3.4 (*)     Globulin 3.9 (*)     Albumin/Globulin Ratio 0.9 (*)     All other components within normal limits   CBC WITH DIFFERENTIAL - Abnormal; Notable for the following components:    MCH 25.9 (*)     MCHC 31.6 (*)     All other components within normal limits   CBC W/ AUTO DIFFERENTIAL    Narrative:     The following orders were created for panel order CBC auto differential.  Procedure                               Abnormality         Status                     ---------                               -----------         ------                     CBC with Differential[[id number]]        Abnormal            Final result                 Please view results for these tests on the individual orders.          Imaging Results    None          Medications   LIDOcaine-racepinep-TETRAcaine 4-0.05-0.5 % Gel 3 mL (3 mLs Topical (Top) Given 4/28/23 1934)   sulfamethoxazole-trimethoprim 800-160mg per tablet 1 tablet (1 tablet Oral Given 4/28/23 1954)   HYDROcodone-acetaminophen 5-325 mg per tablet 1 tablet (1 tablet Oral Given 4/28/23 2050)     Medical Decision Making:   Initial Assessment:   Patient presents with signs and symptoms of simple abscess x2 left forearm.  Does not appear to be any deeper space infection.  Anesthetized at bedside and incision and drainage performed.  Patient has no other  clinical signs or symptoms of sepsis or other significant pathology.  Differential Diagnosis:   Cellulitis, abscess, Bartonella, sporotrichosis  ED Management:  Patient presented with cellulitis and evidence of abscess x2 left forearm.  Complex Incision and drainage performed at bedside in the emergency department.  Decision to cover for outpatient cellulitis well especially given the erythematous appearance of the more distal abscess.  Purulence was expressed from the wound.  Patient otherwise well-appearing.  No clinical signs or symptoms of sepsis or otherwise severe infection.  Given return precautions for worsening of condition.  Agreeable with plan.                        Clinical Impression:   Final diagnoses:  [Z76.89] Encounter for incision and drainage procedure (Primary)  [L02.91] Abscess  [L03.114] Cellulitis of left upper extremity        ED Disposition Condition    Discharge Stable          ED Prescriptions       Medication Sig Dispense Start Date End Date Auth. Provider    sulfamethoxazole-trimethoprim 800-160mg (BACTRIM DS) 800-160 mg Tab Take 1 tablet by mouth 2 (two) times daily. for 7 days 14 tablet 4/28/2023 5/5/2023 Kieran Griffiths MD          Follow-up Information       Follow up With Specialties Details Why Contact Info    Sebastian Davila DO Family Medicine, Hospitalist Schedule an appointment as soon as possible for a visit   [address] [facility] [address]  [phone]      [facility] Emergency Medicine Go to  As needed, If symptoms worsen [address] [facility] [id number] [phone]             Kieran Griffiths MD  04/29/23 0100

## 2023-04-29 NOTE — ED NOTES
Pt presents with abscess to the L FA and elbow for the last week. Pain 3/10. Reports hx of similar abcess with hx of staph

## 2023-07-24 DIAGNOSIS — I10 HYPERTENSION, UNSPECIFIED TYPE: ICD-10-CM

## 2023-07-24 RX ORDER — LOSARTAN POTASSIUM 50 MG/1
TABLET ORAL
Qty: 30 TABLET | Refills: 5 | Status: SHIPPED | OUTPATIENT
Start: 2023-07-24 | End: 2024-01-16

## 2023-10-03 ENCOUNTER — LAB VISIT (OUTPATIENT)
Dept: LAB | Facility: HOSPITAL | Age: 43
End: 2023-10-03
Attending: FAMILY MEDICINE
Payer: MEDICAID

## 2023-10-03 DIAGNOSIS — R73.03 PREDIABETES: ICD-10-CM

## 2023-10-03 DIAGNOSIS — Z00.00 WELLNESS EXAMINATION: ICD-10-CM

## 2023-10-03 DIAGNOSIS — E66.2 OBESITY HYPOVENTILATION SYNDROME: ICD-10-CM

## 2023-10-03 DIAGNOSIS — D50.9 IRON DEFICIENCY ANEMIA, UNSPECIFIED IRON DEFICIENCY ANEMIA TYPE: ICD-10-CM

## 2023-10-03 DIAGNOSIS — I10 PRIMARY HYPERTENSION: ICD-10-CM

## 2023-10-03 DIAGNOSIS — Z00.00 WELLNESS EXAMINATION: Primary | ICD-10-CM

## 2023-10-03 LAB
ALBUMIN SERPL-MCNC: 3.6 G/DL (ref 3.5–5)
ALBUMIN/GLOB SERPL: 0.9 RATIO (ref 1.1–2)
ALP SERPL-CCNC: 84 UNIT/L (ref 40–150)
ALT SERPL-CCNC: 37 UNIT/L (ref 0–55)
AST SERPL-CCNC: 34 UNIT/L (ref 5–34)
BASOPHILS # BLD AUTO: 0.02 X10(3)/MCL
BASOPHILS NFR BLD AUTO: 0.2 %
BILIRUB SERPL-MCNC: 0.5 MG/DL
BUN SERPL-MCNC: 11 MG/DL (ref 7–18.7)
CALCIUM SERPL-MCNC: 9.8 MG/DL (ref 8.4–10.2)
CHLORIDE SERPL-SCNC: 105 MMOL/L (ref 98–107)
CHOLEST SERPL-MCNC: 159 MG/DL
CHOLEST/HDLC SERPL: 5 {RATIO} (ref 0–5)
CO2 SERPL-SCNC: 28 MMOL/L (ref 22–29)
CREAT SERPL-MCNC: 0.74 MG/DL (ref 0.55–1.02)
DEPRECATED CALCIDIOL+CALCIFEROL SERPL-MC: 30.1 NG/ML (ref 30–80)
EOSINOPHIL # BLD AUTO: 0.15 X10(3)/MCL (ref 0–0.9)
EOSINOPHIL NFR BLD AUTO: 1.5 %
ERYTHROCYTE [DISTWIDTH] IN BLOOD BY AUTOMATED COUNT: 14.4 % (ref 11.5–17)
EST. AVERAGE GLUCOSE BLD GHB EST-MCNC: 111.2 MG/DL
FERRITIN SERPL-MCNC: 358.08 NG/ML (ref 4.63–204)
GFR SERPLBLD CREATININE-BSD FMLA CKD-EPI: >60 MLS/MIN/1.73/M2
GLOBULIN SER-MCNC: 3.9 GM/DL (ref 2.4–3.5)
GLUCOSE SERPL-MCNC: 111 MG/DL (ref 74–100)
HBA1C MFR BLD: 5.5 %
HCT VFR BLD AUTO: 42.2 % (ref 37–47)
HDLC SERPL-MCNC: 34 MG/DL (ref 35–60)
HGB BLD-MCNC: 13.5 G/DL (ref 12–16)
IMM GRANULOCYTES # BLD AUTO: 0.03 X10(3)/MCL (ref 0–0.04)
IMM GRANULOCYTES NFR BLD AUTO: 0.3 %
IRON SATN MFR SERPL: 24 % (ref 20–50)
IRON SERPL-MCNC: 57 UG/DL (ref 50–170)
LDLC SERPL CALC-MCNC: 107 MG/DL (ref 50–140)
LYMPHOCYTES # BLD AUTO: 2.04 X10(3)/MCL (ref 0.6–4.6)
LYMPHOCYTES NFR BLD AUTO: 20.1 %
MCH RBC QN AUTO: 26.3 PG (ref 27–31)
MCHC RBC AUTO-ENTMCNC: 32 G/DL (ref 33–36)
MCV RBC AUTO: 82.1 FL (ref 80–94)
MONOCYTES # BLD AUTO: 0.5 X10(3)/MCL (ref 0.1–1.3)
MONOCYTES NFR BLD AUTO: 4.9 %
NEUTROPHILS # BLD AUTO: 7.42 X10(3)/MCL (ref 2.1–9.2)
NEUTROPHILS NFR BLD AUTO: 73 %
NRBC BLD AUTO-RTO: 0 %
PLATELET # BLD AUTO: 264 X10(3)/MCL (ref 130–400)
PMV BLD AUTO: 9.6 FL (ref 7.4–10.4)
POTASSIUM SERPL-SCNC: 4 MMOL/L (ref 3.5–5.1)
PROT SERPL-MCNC: 7.5 GM/DL (ref 6.4–8.3)
RBC # BLD AUTO: 5.14 X10(6)/MCL (ref 4.2–5.4)
SODIUM SERPL-SCNC: 141 MMOL/L (ref 136–145)
TIBC SERPL-MCNC: 179 UG/DL (ref 70–310)
TIBC SERPL-MCNC: 236 UG/DL (ref 250–450)
TRANSFERRIN SERPL-MCNC: 192 MG/DL (ref 180–382)
TRIGL SERPL-MCNC: 92 MG/DL (ref 37–140)
TSH SERPL-ACNC: 1.31 UIU/ML (ref 0.35–4.94)
VIT B12 SERPL-MCNC: 672 PG/ML (ref 213–816)
VLDLC SERPL CALC-MCNC: 18 MG/DL
WBC # SPEC AUTO: 10.16 X10(3)/MCL (ref 4.5–11.5)

## 2023-10-03 PROCEDURE — 85025 COMPLETE CBC W/AUTO DIFF WBC: CPT

## 2023-10-03 PROCEDURE — 83550 IRON BINDING TEST: CPT

## 2023-10-03 PROCEDURE — 83036 HEMOGLOBIN GLYCOSYLATED A1C: CPT

## 2023-10-03 PROCEDURE — 84443 ASSAY THYROID STIM HORMONE: CPT

## 2023-10-03 PROCEDURE — 82306 VITAMIN D 25 HYDROXY: CPT

## 2023-10-03 PROCEDURE — 80061 LIPID PANEL: CPT

## 2023-10-03 PROCEDURE — 82728 ASSAY OF FERRITIN: CPT

## 2023-10-03 PROCEDURE — 82607 VITAMIN B-12: CPT

## 2023-10-03 PROCEDURE — 80053 COMPREHEN METABOLIC PANEL: CPT

## 2023-10-03 PROCEDURE — 36415 COLL VENOUS BLD VENIPUNCTURE: CPT

## 2023-10-13 ENCOUNTER — OFFICE VISIT (OUTPATIENT)
Dept: FAMILY MEDICINE | Facility: CLINIC | Age: 43
End: 2023-10-13
Payer: MEDICAID

## 2023-10-13 VITALS
OXYGEN SATURATION: 97 % | WEIGHT: 262 LBS | RESPIRATION RATE: 20 BRPM | TEMPERATURE: 97 F | DIASTOLIC BLOOD PRESSURE: 80 MMHG | BODY MASS INDEX: 52.82 KG/M2 | HEART RATE: 82 BPM | SYSTOLIC BLOOD PRESSURE: 138 MMHG | HEIGHT: 59 IN

## 2023-10-13 DIAGNOSIS — Z00.00 WELL ADULT EXAM: Primary | ICD-10-CM

## 2023-10-13 DIAGNOSIS — Z12.31 BREAST CANCER SCREENING BY MAMMOGRAM: ICD-10-CM

## 2023-10-13 DIAGNOSIS — Z23 FLU VACCINE NEED: ICD-10-CM

## 2023-10-13 DIAGNOSIS — E66.01 MORBID OBESITY: ICD-10-CM

## 2023-10-13 DIAGNOSIS — I10 PRIMARY HYPERTENSION: ICD-10-CM

## 2023-10-13 DIAGNOSIS — K21.9 GASTROESOPHAGEAL REFLUX DISEASE, UNSPECIFIED WHETHER ESOPHAGITIS PRESENT: ICD-10-CM

## 2023-10-13 DIAGNOSIS — R09.81 NASAL CONGESTION: ICD-10-CM

## 2023-10-13 DIAGNOSIS — D50.9 IRON DEFICIENCY ANEMIA, UNSPECIFIED IRON DEFICIENCY ANEMIA TYPE: ICD-10-CM

## 2023-10-13 PROCEDURE — 1160F PR REVIEW ALL MEDS BY PRESCRIBER/CLIN PHARMACIST DOCUMENTED: ICD-10-PCS | Mod: CPTII,,,

## 2023-10-13 PROCEDURE — 3008F BODY MASS INDEX DOCD: CPT | Mod: CPTII,,,

## 2023-10-13 PROCEDURE — 3075F PR MOST RECENT SYSTOLIC BLOOD PRESS GE 130-139MM HG: ICD-10-PCS | Mod: CPTII,,,

## 2023-10-13 PROCEDURE — 3079F PR MOST RECENT DIASTOLIC BLOOD PRESSURE 80-89 MM HG: ICD-10-PCS | Mod: CPTII,,,

## 2023-10-13 PROCEDURE — 3079F DIAST BP 80-89 MM HG: CPT | Mod: CPTII,,,

## 2023-10-13 PROCEDURE — 1159F MED LIST DOCD IN RCRD: CPT | Mod: CPTII,,,

## 2023-10-13 PROCEDURE — 1159F PR MEDICATION LIST DOCUMENTED IN MEDICAL RECORD: ICD-10-PCS | Mod: CPTII,,,

## 2023-10-13 PROCEDURE — 3075F SYST BP GE 130 - 139MM HG: CPT | Mod: CPTII,,,

## 2023-10-13 PROCEDURE — 1160F RVW MEDS BY RX/DR IN RCRD: CPT | Mod: CPTII,,,

## 2023-10-13 PROCEDURE — 4010F PR ACE/ARB THEARPY RXD/TAKEN: ICD-10-PCS | Mod: CPTII,,,

## 2023-10-13 PROCEDURE — 3044F PR MOST RECENT HEMOGLOBIN A1C LEVEL <7.0%: ICD-10-PCS | Mod: CPTII,,,

## 2023-10-13 PROCEDURE — 99396 PREV VISIT EST AGE 40-64: CPT | Mod: 25,,,

## 2023-10-13 PROCEDURE — 90471 FLU VACCINE (QUAD) GREATER THAN OR EQUAL TO 3YO PRESERVATIVE FREE IM: ICD-10-PCS | Mod: ,,,

## 2023-10-13 PROCEDURE — 99396 PR PREVENTIVE VISIT,EST,40-64: ICD-10-PCS | Mod: 25,,,

## 2023-10-13 PROCEDURE — 90686 FLU VACCINE (QUAD) GREATER THAN OR EQUAL TO 3YO PRESERVATIVE FREE IM: ICD-10-PCS | Mod: ,,,

## 2023-10-13 PROCEDURE — 3008F PR BODY MASS INDEX (BMI) DOCUMENTED: ICD-10-PCS | Mod: CPTII,,,

## 2023-10-13 PROCEDURE — 3044F HG A1C LEVEL LT 7.0%: CPT | Mod: CPTII,,,

## 2023-10-13 PROCEDURE — 4010F ACE/ARB THERAPY RXD/TAKEN: CPT | Mod: CPTII,,,

## 2023-10-13 PROCEDURE — 90471 IMMUNIZATION ADMIN: CPT | Mod: ,,,

## 2023-10-13 PROCEDURE — 90686 IIV4 VACC NO PRSV 0.5 ML IM: CPT | Mod: ,,,

## 2023-10-13 RX ORDER — PANTOPRAZOLE SODIUM 40 MG/1
40 TABLET, DELAYED RELEASE ORAL DAILY
Qty: 30 TABLET | Refills: 11 | Status: SHIPPED | OUTPATIENT
Start: 2023-10-13 | End: 2024-10-12

## 2023-10-13 RX ORDER — FLUTICASONE PROPIONATE 50 MCG
2 SPRAY, SUSPENSION (ML) NASAL DAILY
Qty: 9.9 ML | Refills: 5 | Status: SHIPPED | OUTPATIENT
Start: 2023-10-13 | End: 2023-12-14 | Stop reason: SDUPTHER

## 2023-10-13 NOTE — ASSESSMENT & PLAN NOTE
Reviewed last readings and BP has improved.    Continue losartan 50 mg daily.     Low Sodium Diet (DASH Diet - Less than 2 grams of sodium per day).  Monitor blood pressure daily and log. Report consistent numbers greater than 140/90.  Maintain healthy weight with goal BMI <30. Exercise 30 minutes per day, 5 days per week.

## 2023-10-13 NOTE — ASSESSMENT & PLAN NOTE
Avoid Irritants and allergens.  Wash hands frequently to prevent common colds.  Drink plenty of fluids to thin mucous and/or use humidifier.    Consider NeilMed Saline Sinus Rinse BID.

## 2023-10-13 NOTE — ASSESSMENT & PLAN NOTE
Continue Protonix 40 mg daily.     Avoid spicy, acidic, fried foods and alcohol.  Eat 2-3 hours before going to bed.  Avoid tight clothing, chew food thoroughly.  Reduce caffeine intake, avoid soda.

## 2023-10-13 NOTE — PROGRESS NOTES
Patient ID: [id number]     Chief Complaint: Annual Exam and numbness in bottom of feet      HPI:     Mellissa Ann Milligan is a 43 y.o. female here today for an annual wellness visit. Reviewed and discussed lab results.  Overall she feels well. She is having some nasal congestion, but says that the weather change is probably the cause. No other complaints today.     Past Medical History:   Diagnosis Date    Asthma     Chronic sinusitis, unspecified     Dietary iron deficiency     Essential (primary) hypertension     Eustachian tube dysfunction, bilateral     DOMINIQUE (iron deficiency anemia)     Leukocytosis     Morbid obesity     Nasal congestion     Nasal polyps     Obesity hypoventilation syndrome     Obstructive sleep apnea     JOSEPH on CPAP     Prediabetes         Past Surgical History:   Procedure Laterality Date     SECTION      CHOLECYSTECTOMY      COLONOSCOPY  07/15/2021    Dr Ricky Hale    ESOPHAGOGASTRODUODENOSCOPY  2021    Dr Ricky Hale    FESS, WITH DACRYOCYSTORHINOSTOMY      NASAL TURBINATE REDUCTION      SINUS SURGERY          Social History     Socioeconomic History    Marital status:    Tobacco Use    Smoking status: Never    Smokeless tobacco: Never   Substance and Sexual Activity    Alcohol use: Never    Drug use: Never    Sexual activity: Not Currently     Social Determinants of Health     Financial Resource Strain: Medium Risk (10/13/2023)    Overall Financial Resource Strain (CARDIA)     Difficulty of Paying Living Expenses: Somewhat hard   Food Insecurity: No Food Insecurity (10/13/2023)    Hunger Vital Sign     Worried About Running Out of Food in the Last Year: Never true     Ran Out of Food in the Last Year: Never true   Transportation Needs: No Transportation Needs (10/13/2023)    PRAPARE - Transportation     Lack of Transportation (Medical): No     Lack of Transportation (Non-Medical): No   Physical Activity: Inactive (10/13/2023)    Exercise Vital Sign     Days of Exercise  per Week: 0 days     Minutes of Exercise per Session: 0 min   Stress: No Stress Concern Present (10/13/2023)    [employer] [place] [employer] - Occupational Stress Questionnaire     Feeling of Stress : Not at all   Social Connections: Moderately Isolated (10/13/2023)    Social Connection and Isolation Panel [NHANES]     Frequency of Communication with Friends and Family: More than three times a week     Frequency of Social Gatherings with Friends and Family: More than three times a week     Attends Mu-ism Services: Never     Active Member of Clubs or Organizations: No     Attends Club or Organization Meetings: Never     Marital Status:    Housing Stability: Low Risk  (10/13/2023)    Housing Stability Vital Sign     Unable to Pay for Housing in the Last Year: No     Number of Places Lived in the Last Year: 1     Unstable Housing in the Last Year: No        Current Outpatient Medications   Medication Instructions    albuterol (PROVENTIL HFA) 90 mcg/actuation inhaler 2 puffs, Inhalation, Every 4 hours PRN    albuterol-ipratropium (DUO-NEB) 2.5 mg-0.5 mg/3 mL nebulizer solution 3 mLs, Nebulization, 4 times daily, Rescue    budesonide-formoterol 160-4.5 mcg (SYMBICORT) 160-4.5 mcg/actuation HFAA INHALE 2 PUFFS BY MOUTH TWICE A DAY    ferrous gluconate (FERGON) 324 MG tablet TAKE 1 TABLET BY MOUTH TWICE A DAY    fluticasone propionate (FLONASE) 100 mcg, Each Nostril, Daily    losartan (COZAAR) 50 MG tablet TAKE 1 TABLET BY MOUTH EVERY DAY    pantoprazole (PROTONIX) 40 mg, Oral, Daily    SPIRIVA WITH HANDIHALER 18 mcg inhalation capsule INHALE 2 INHALATIONS OF ONE CAPSULE ONCE DAILY       Review of patient's allergies indicates:  No Known Allergies     Patient Care Team:  Sebastian Davila DO as PCP - General (Family Medicine)  Thee Raymond MD as Consulting Physician (Otolaryngology)  Ricky Hale MD as Consulting Physician (Gastroenterology)  Marco Armstrong MD as Consulting Physician  "(Pulmonary Disease)     Subjective:     Review of Systems    12 point review of systems conducted, negative except as stated in the history of present illness. See HPI for details.    Objective:     Visit Vitals  /80 (BP Location: Right arm, Patient Position: Sitting, BP Method: Large (Automatic))   Pulse 82   Temp 97.3 °F (36.3 °C)   Resp 20   Ht 4' 11" (1.499 m)   Wt 118.8 kg (262 lb)   LMP 10/06/2023 (Approximate)   SpO2 97%   BMI 52.92 kg/m²       Physical Exam  Vitals and nursing note reviewed.   Constitutional:       General: She is not in acute distress.     Appearance: She is obese. She is not ill-appearing.   HENT:      Head: Normocephalic and atraumatic.      Mouth/Throat:      Mouth: Mucous membranes are moist.      Pharynx: Oropharynx is clear.   Eyes:      General: No scleral icterus.     Extraocular Movements: Extraocular movements intact.      Conjunctiva/sclera: Conjunctivae normal.      Pupils: Pupils are equal, round, and reactive to light.   Neck:      Vascular: No carotid bruit.   Cardiovascular:      Rate and Rhythm: Normal rate and regular rhythm.      Heart sounds: No murmur heard.     No friction rub. No gallop.   Pulmonary:      Effort: Pulmonary effort is normal. No respiratory distress.      Breath sounds: Normal breath sounds. No wheezing, rhonchi or rales.   Abdominal:      General: Abdomen is flat. Bowel sounds are normal. There is no distension.      Palpations: Abdomen is soft. There is no mass.      Tenderness: There is no abdominal tenderness.   Musculoskeletal:         General: Normal range of motion.      Cervical back: Normal range of motion and neck supple.   Skin:     General: Skin is warm and dry.   Neurological:      General: No focal deficit present.      Mental Status: She is alert.   Psychiatric:         Mood and Affect: Mood normal.         Labs Reviewed:     Chemistry:  Lab Results   Component Value Date     10/03/2023    K 4.0 10/03/2023    CHLORIDE 105 " 10/03/2023    BUN 11.0 10/03/2023    CREATININE 0.74 10/03/2023    EGFRNORACEVR >60 10/03/2023    GLUCOSE 111 (H) 10/03/2023    CALCIUM 9.8 10/03/2023    ALKPHOS 84 10/03/2023    LABPROT 7.5 10/03/2023    ALBUMIN 3.6 10/03/2023    BILIDIR 0.1 09/30/2021    IBILI 0.10 09/30/2021    AST 34 10/03/2023    ALT 37 10/03/2023    ZKCJSPQA23HK 30.1 10/03/2023    TSH 1.311 10/03/2023    [id number] 1.25 08/24/2017        Lab Results   Component Value Date    HGBA1C 5.5 10/03/2023        Hematology:  Lab Results   Component Value Date    WBC 10.16 10/03/2023    HGB 13.5 10/03/2023    HCT 42.2 10/03/2023     10/03/2023       Lipid Panel:  Lab Results   Component Value Date    CHOL 159 10/03/2023    HDL 34 (L) 10/03/2023    .00 10/03/2023    TRIG 92 10/03/2023    TOTALCHOLEST 5 10/03/2023        Urine:  Lab Results   Component Value Date    APPEARANCEUA CLEAR 07/02/2019    PROTEINUA Negative 07/02/2019    LEUKOCYTESUR Negative 07/02/2019    RBCUA 0-3 07/02/2019    WBCUA None Seen 07/02/2019    BACTERIA Rare (A) 07/02/2019        Assessment:       ICD-10-CM ICD-9-CM   1. Well adult exam  Z00.00 V70.0   2. Morbid obesity  E66.01 278.01   3. Primary hypertension  I10 401.9   4. Iron deficiency anemia, unspecified iron deficiency anemia type  D50.9 280.9   5. Gastroesophageal reflux disease, unspecified whether esophagitis present  K21.9 530.81   6. Nasal congestion  R09.81 478.19   7. Breast cancer screening by mammogram  Z12.31 V76.12   8. Flu vaccine need  Z23 V04.81        Plan:     Cervical Cancer Screening -  Last Pap on 9/22/2020. Follow up 2025.    Breast Cancer Screening - Last Mammogram on 10/20/2022 . Normal. Mammogram ordered today.     Eye Exam - Recommend annually.    Dental Exam - Recommend biannual exams.     Vaccinations -   Immunization History   Administered Date(s) Administered    COVID-19, MRNA, LN-S, PF (Pfizer) (Purple Cap) 03/27/2021, 04/17/2021    Influenza - Quadrivalent 09/22/2020, 09/30/2021     Influenza - Quadrivalent - MDCK - PF 10/14/2019    Influenza - Quadrivalent - PF *Preferred* (6 months and older) 11/15/2002, 10/07/2022, 10/13/2023    Influenza - Trivalent - PF (ADULT) 11/15/2002, 10/01/2019    Pneumococcal Polysaccharide - 23 Valent 12/17/2019, 09/30/2021    Tdap 09/27/2020          1. Well adult exam    2. Morbid obesity  Assessment & Plan:  Body mass index is 52.92 kg/m².     Goal BMI <30.  Exercise 5 times a week for 30 minutes per day.  Avoid soda, simple sugars, excessive rice, potatoes or bread. Limit fast foods and fried foods.  Choose complex carbs in moderation (example: green vegetables, beans, oatmeal). Eat plenty of fresh fruits and vegetables with lean meats daily.  Do not skip meals. Eat a balanced portion size.  Avoid fad diets. Consider permanent healthy life style changes.       3. Primary hypertension  Assessment & Plan:  Reviewed last readings and BP has improved.    Continue losartan 50 mg daily.     Low Sodium Diet (DASH Diet - Less than 2 grams of sodium per day).  Monitor blood pressure daily and log. Report consistent numbers greater than 140/90.  Maintain healthy weight with goal BMI <30. Exercise 30 minutes per day, 5 days per week.        4. Iron deficiency anemia, unspecified iron deficiency anemia type  Assessment & Plan:  Start ferrous sulfate 324 mg before breakfast daily.     Discussed side effects of the medication such as upset stomach and constipation.  Eat high-fiber foods such as whole grains, fruits, and vegetables.   Drink plenty of water and other fluids each day.   Monitor stool color.   Exercise regularly.     Get emergency help if you vomit blood (may look like coffee grounds), stools are black or tar colored, or if you feel weak.         5. Gastroesophageal reflux disease, unspecified whether esophagitis present  Assessment & Plan:  Continue Protonix 40 mg daily.     Avoid spicy, acidic, fried foods and alcohol.  Eat 2-3 hours before going to  bed.  Avoid tight clothing, chew food thoroughly.  Reduce caffeine intake, avoid soda.      Orders:  -     pantoprazole (PROTONIX) 40 MG tablet; Take 1 tablet (40 mg total) by mouth once daily.  Dispense: 30 tablet; Refill: 11    6. Nasal congestion  Assessment & Plan:  Avoid Irritants and allergens.  Wash hands frequently to prevent common colds.  Drink plenty of fluids to thin mucous and/or use humidifier.    Consider NeilMed Saline Sinus Rinse BID.          Orders:  -     fluticasone propionate (FLONASE) 50 mcg/actuation nasal spray; 2 sprays (100 mcg total) by Each Nostril route once daily.  Dispense: 9.9 mL; Refill: 5    7. Breast cancer screening by mammogram  -     Mammo Digital Screening Bil[facility] w/ [facility]; Future; Expected date: 10/23/2023    8. Flu vaccine need  -     Influenza - Quadrivalent *Preferred* (6 months+) (PF)       Follow up in about 6 months (around 4/13/2024) for Follow Up. In addition to their scheduled follow up, the patient has also been instructed to follow up on as needed basis.     Star Odonnell NP

## 2023-10-13 NOTE — ASSESSMENT & PLAN NOTE
Start ferrous sulfate 324 mg before breakfast daily.     Discussed side effects of the medication such as upset stomach and constipation.  Eat high-fiber foods such as whole grains, fruits, and vegetables.   Drink plenty of water and other fluids each day.   Monitor stool color.   Exercise regularly.     Get emergency help if you vomit blood (may look like coffee grounds), stools are black or tar colored, or if you feel weak.

## 2023-10-13 NOTE — ASSESSMENT & PLAN NOTE

## 2023-10-21 DIAGNOSIS — J45.50 SEVERE PERSISTENT ASTHMA WITHOUT COMPLICATION: ICD-10-CM

## 2023-10-23 ENCOUNTER — HOSPITAL ENCOUNTER (OUTPATIENT)
Dept: RADIOLOGY | Facility: HOSPITAL | Age: 43
Discharge: HOME | End: 2023-10-23
Payer: MEDICAID

## 2023-10-23 RX ORDER — BUDESONIDE AND FORMOTEROL FUMARATE DIHYDRATE 160; 4.5 UG/1; UG/1
2 AEROSOL RESPIRATORY (INHALATION) 2 TIMES DAILY
Qty: 10.2 G | Refills: 5 | Status: SHIPPED | OUTPATIENT
Start: 2023-10-23

## 2023-10-31 ENCOUNTER — HOSPITAL ENCOUNTER (OUTPATIENT)
Dept: RADIOLOGY | Facility: HOSPITAL | Age: 43
Discharge: HOME | End: 2023-10-31
Payer: MEDICAID

## 2023-10-31 DIAGNOSIS — Z12.31 BREAST CANCER SCREENING BY MAMMOGRAM: ICD-10-CM

## 2023-10-31 PROCEDURE — 77067 MAMMO DIGITAL SCREENING BILAT WITH TOMO: ICD-10-PCS | Mod: 26,,, | Performed by: STUDENT IN AN ORGANIZED HEALTH CARE EDUCATION/TRAINING PROGRAM

## 2023-10-31 PROCEDURE — 77067 SCR MAMMO BI INCL CAD: CPT | Mod: 26,,, | Performed by: STUDENT IN AN ORGANIZED HEALTH CARE EDUCATION/TRAINING PROGRAM

## 2023-10-31 PROCEDURE — 77067 SCR MAMMO BI INCL CAD: CPT | Mod: TC

## 2023-10-31 PROCEDURE — 77063 MAMMO DIGITAL SCREENING BILAT WITH TOMO: ICD-10-PCS | Mod: 26,,, | Performed by: STUDENT IN AN ORGANIZED HEALTH CARE EDUCATION/TRAINING PROGRAM

## 2023-10-31 PROCEDURE — 77063 BREAST TOMOSYNTHESIS BI: CPT | Mod: 26,,, | Performed by: STUDENT IN AN ORGANIZED HEALTH CARE EDUCATION/TRAINING PROGRAM

## 2023-11-05 DIAGNOSIS — J45.909 ASTHMA, UNSPECIFIED ASTHMA SEVERITY, UNSPECIFIED WHETHER COMPLICATED, UNSPECIFIED WHETHER PERSISTENT: ICD-10-CM

## 2023-11-06 RX ORDER — IPRATROPIUM BROMIDE AND ALBUTEROL SULFATE 2.5; .5 MG/3ML; MG/3ML
SOLUTION RESPIRATORY (INHALATION)
Qty: 180 ML | Refills: 5 | Status: SHIPPED | OUTPATIENT
Start: 2023-11-06

## 2023-12-14 ENCOUNTER — TELEPHONE (OUTPATIENT)
Dept: FAMILY MEDICINE | Facility: CLINIC | Age: 43
End: 2023-12-14
Payer: MEDICAID

## 2023-12-14 DIAGNOSIS — R09.81 NASAL CONGESTION: ICD-10-CM

## 2023-12-14 DIAGNOSIS — J45.909 ASTHMA, UNSPECIFIED ASTHMA SEVERITY, UNSPECIFIED WHETHER COMPLICATED, UNSPECIFIED WHETHER PERSISTENT: ICD-10-CM

## 2023-12-14 RX ORDER — FLUTICASONE PROPIONATE 50 MCG
2 SPRAY, SUSPENSION (ML) NASAL DAILY
Qty: 9.9 ML | Refills: 5 | Status: SHIPPED | OUTPATIENT
Start: 2023-12-14 | End: 2024-12-13

## 2023-12-14 RX ORDER — TIOTROPIUM BROMIDE 18 UG/1
CAPSULE ORAL; RESPIRATORY (INHALATION)
Qty: 30 CAPSULE | Refills: 6 | Status: SHIPPED | OUTPATIENT
Start: 2023-12-14

## 2023-12-14 NOTE — TELEPHONE ENCOUNTER
----- Message from Nelsy Herad sent at 12/14/2023  2:56 PM CST -----  Regarding: refill  SPIRIVA WITH HANDIHALER 18 mcg inhalation capsule  fluticasone propionate (FLONASE) 50 mcg/actuation nasal spray, Otilia needs a refill sent to [facility]       Thanks

## 2024-01-13 DIAGNOSIS — I10 HYPERTENSION, UNSPECIFIED TYPE: ICD-10-CM

## 2024-01-15 PROBLEM — Z00.00 WELL ADULT EXAM: Status: RESOLVED | Noted: 2023-10-13 | Resolved: 2024-01-15

## 2024-01-16 RX ORDER — LOSARTAN POTASSIUM 50 MG/1
TABLET ORAL
Qty: 30 TABLET | Refills: 5 | Status: SHIPPED | OUTPATIENT
Start: 2024-01-16 | End: 2024-04-15 | Stop reason: SDUPTHER

## 2024-02-16 ENCOUNTER — TELEPHONE (OUTPATIENT)
Dept: FAMILY MEDICINE | Facility: CLINIC | Age: 44
End: 2024-02-16
Payer: MEDICAID

## 2024-02-16 DIAGNOSIS — J45.909 ASTHMA, UNSPECIFIED ASTHMA SEVERITY, UNSPECIFIED WHETHER COMPLICATED, UNSPECIFIED WHETHER PERSISTENT: ICD-10-CM

## 2024-02-16 RX ORDER — ALBUTEROL SULFATE 90 UG/1
2 AEROSOL, METERED RESPIRATORY (INHALATION) EVERY 4 HOURS PRN
Qty: 6.7 G | Refills: 1 | Status: SHIPPED | OUTPATIENT
Start: 2024-02-16 | End: 2024-04-15 | Stop reason: SDUPTHER

## 2024-02-16 NOTE — TELEPHONE ENCOUNTER
----- Message from Chapis Barnard sent at 2/16/2024 11:20 AM CST -----  Regarding: REFILL REQUEST  Needs her Proventil inhaler refilled at [facility].

## 2024-03-15 DIAGNOSIS — E61.1 DIETARY IRON DEFICIENCY: ICD-10-CM

## 2024-03-15 RX ORDER — FERROUS GLUCONATE 324(38)MG
TABLET ORAL
Qty: 60 TABLET | Refills: 5 | Status: SHIPPED | OUTPATIENT
Start: 2024-03-15

## 2024-04-15 ENCOUNTER — OFFICE VISIT (OUTPATIENT)
Dept: FAMILY MEDICINE | Facility: CLINIC | Age: 44
End: 2024-04-15
Payer: MEDICAID

## 2024-04-15 ENCOUNTER — TELEPHONE (OUTPATIENT)
Dept: FAMILY MEDICINE | Facility: CLINIC | Age: 44
End: 2024-04-15

## 2024-04-15 ENCOUNTER — LAB VISIT (OUTPATIENT)
Dept: LAB | Facility: HOSPITAL | Age: 44
End: 2024-04-15
Payer: MEDICAID

## 2024-04-15 VITALS
DIASTOLIC BLOOD PRESSURE: 84 MMHG | HEIGHT: 59 IN | HEART RATE: 73 BPM | BODY MASS INDEX: 53.95 KG/M2 | RESPIRATION RATE: 20 BRPM | TEMPERATURE: 98 F | SYSTOLIC BLOOD PRESSURE: 134 MMHG | OXYGEN SATURATION: 94 % | WEIGHT: 267.63 LBS

## 2024-04-15 DIAGNOSIS — J45.909 ASTHMA, UNSPECIFIED ASTHMA SEVERITY, UNSPECIFIED WHETHER COMPLICATED, UNSPECIFIED WHETHER PERSISTENT: ICD-10-CM

## 2024-04-15 DIAGNOSIS — I10 PRIMARY HYPERTENSION: Primary | ICD-10-CM

## 2024-04-15 DIAGNOSIS — R20.2 NUMBNESS AND TINGLING OF BOTH FEET: ICD-10-CM

## 2024-04-15 DIAGNOSIS — J45.50 SEVERE PERSISTENT ASTHMA WITHOUT COMPLICATION: ICD-10-CM

## 2024-04-15 DIAGNOSIS — R20.0 NUMBNESS AND TINGLING OF BOTH FEET: ICD-10-CM

## 2024-04-15 DIAGNOSIS — R09.81 NASAL CONGESTION: ICD-10-CM

## 2024-04-15 PROBLEM — R73.03 PRE-DIABETES: Status: ACTIVE | Noted: 2024-04-15

## 2024-04-15 PROBLEM — L02.511 ABSCESS OF FINGER OF RIGHT HAND: Status: RESOLVED | Noted: 2023-04-06 | Resolved: 2024-04-15

## 2024-04-15 LAB
ALBUMIN SERPL-MCNC: 3.5 G/DL (ref 3.5–5)
ALBUMIN/GLOB SERPL: 0.9 RATIO (ref 1.1–2)
ALP SERPL-CCNC: 83 UNIT/L (ref 40–150)
ALT SERPL-CCNC: 33 UNIT/L (ref 0–55)
AST SERPL-CCNC: 28 UNIT/L (ref 5–34)
BILIRUB SERPL-MCNC: 0.4 MG/DL
BUN SERPL-MCNC: 9 MG/DL (ref 7–18.7)
CALCIUM SERPL-MCNC: 9.5 MG/DL (ref 8.4–10.2)
CHLORIDE SERPL-SCNC: 105 MMOL/L (ref 98–107)
CO2 SERPL-SCNC: 28 MMOL/L (ref 22–29)
CREAT SERPL-MCNC: 0.69 MG/DL (ref 0.55–1.02)
EST. AVERAGE GLUCOSE BLD GHB EST-MCNC: 128.4 MG/DL
GFR SERPLBLD CREATININE-BSD FMLA CKD-EPI: >60 MLS/MIN/1.73/M2
GLOBULIN SER-MCNC: 3.8 GM/DL (ref 2.4–3.5)
GLUCOSE SERPL-MCNC: 138 MG/DL (ref 74–100)
HBA1C MFR BLD: 6.1 %
POTASSIUM SERPL-SCNC: 4.1 MMOL/L (ref 3.5–5.1)
PROT SERPL-MCNC: 7.3 GM/DL (ref 6.4–8.3)
SODIUM SERPL-SCNC: 140 MMOL/L (ref 136–145)

## 2024-04-15 PROCEDURE — 80053 COMPREHEN METABOLIC PANEL: CPT

## 2024-04-15 PROCEDURE — 1159F MED LIST DOCD IN RCRD: CPT | Mod: CPTII,,,

## 2024-04-15 PROCEDURE — 83036 HEMOGLOBIN GLYCOSYLATED A1C: CPT

## 2024-04-15 PROCEDURE — 36415 COLL VENOUS BLD VENIPUNCTURE: CPT

## 2024-04-15 PROCEDURE — 1160F RVW MEDS BY RX/DR IN RCRD: CPT | Mod: CPTII,,,

## 2024-04-15 PROCEDURE — 4010F ACE/ARB THERAPY RXD/TAKEN: CPT | Mod: CPTII,,,

## 2024-04-15 PROCEDURE — 99214 OFFICE O/P EST MOD 30 MIN: CPT | Mod: ,,,

## 2024-04-15 PROCEDURE — 3075F SYST BP GE 130 - 139MM HG: CPT | Mod: CPTII,,,

## 2024-04-15 PROCEDURE — 3079F DIAST BP 80-89 MM HG: CPT | Mod: CPTII,,,

## 2024-04-15 PROCEDURE — 3008F BODY MASS INDEX DOCD: CPT | Mod: CPTII,,,

## 2024-04-15 RX ORDER — ALBUTEROL SULFATE 90 UG/1
2 AEROSOL, METERED RESPIRATORY (INHALATION) EVERY 4 HOURS PRN
Qty: 6.7 G | Refills: 1 | Status: SHIPPED | OUTPATIENT
Start: 2024-04-15 | End: 2024-05-20

## 2024-04-15 RX ORDER — FLUTICASONE PROPIONATE 50 MCG
2 SPRAY, SUSPENSION (ML) NASAL DAILY
Qty: 9.9 ML | Refills: 5 | Status: SHIPPED | OUTPATIENT
Start: 2024-04-15 | End: 2025-04-15

## 2024-04-15 RX ORDER — GABAPENTIN 100 MG/1
100 CAPSULE ORAL 3 TIMES DAILY
Qty: 90 CAPSULE | Refills: 5 | Status: SHIPPED | OUTPATIENT
Start: 2024-04-15 | End: 2024-10-12

## 2024-04-15 RX ORDER — LOSARTAN POTASSIUM 50 MG/1
50 TABLET ORAL DAILY
Qty: 30 TABLET | Refills: 5 | Status: SHIPPED | OUTPATIENT
Start: 2024-04-15 | End: 2024-10-12

## 2024-04-15 RX ORDER — BUDESONIDE AND FORMOTEROL FUMARATE DIHYDRATE 160; 4.5 UG/1; UG/1
2 AEROSOL RESPIRATORY (INHALATION) 2 TIMES DAILY
Qty: 10.2 G | Refills: 5 | Status: SHIPPED | OUTPATIENT
Start: 2024-04-15

## 2024-04-15 NOTE — TELEPHONE ENCOUNTER
----- Message from Star Odonnell NP sent at 4/15/2024 11:25 AM CDT -----  Please inform patient of lab results.     1. Pre-diabetes. Please make sure patient understands to make diet and lifestyle modifications. Elevated sugars is most likely causing numbness and tingling in feet.      Thanks for all you do,   Star

## 2024-04-15 NOTE — ASSESSMENT & PLAN NOTE
Well controlled.   Continue Losartan 50 mg daily.   Low Sodium Diet (DASH Diet - Less than 2 grams of sodium per day).  Monitor blood pressure daily and log. Report consistent numbers greater than 140/90.  Maintain healthy weight with goal BMI <30. Exercise 30 minutes per day, 5 days per week.

## 2024-04-15 NOTE — TELEPHONE ENCOUNTER
----- Message from Star Odonnell NP sent at 4/15/2024 11:25 AM CDT -----  Please inform patient of lab results.     1. Pre-diabetes. Please make sure patient understands to make diet and lifestyle modifications. Elevated sugars is most likely causing numbness and tingling in feet.      Thanks for all you do,   Star   
Patient given results  
Medications

## 2024-05-11 DIAGNOSIS — J45.909 ASTHMA, UNSPECIFIED ASTHMA SEVERITY, UNSPECIFIED WHETHER COMPLICATED, UNSPECIFIED WHETHER PERSISTENT: ICD-10-CM

## 2024-05-13 RX ORDER — IPRATROPIUM BROMIDE AND ALBUTEROL SULFATE 2.5; .5 MG/3ML; MG/3ML
SOLUTION RESPIRATORY (INHALATION)
Qty: 180 ML | Refills: 5 | Status: SHIPPED | OUTPATIENT
Start: 2024-05-13

## 2024-05-20 DIAGNOSIS — J45.909 ASTHMA, UNSPECIFIED ASTHMA SEVERITY, UNSPECIFIED WHETHER COMPLICATED, UNSPECIFIED WHETHER PERSISTENT: ICD-10-CM

## 2024-05-20 RX ORDER — ALBUTEROL SULFATE 90 UG/1
AEROSOL, METERED RESPIRATORY (INHALATION)
Qty: 18 G | Refills: 11 | Status: SHIPPED | OUTPATIENT
Start: 2024-05-20

## 2024-08-02 DIAGNOSIS — J45.909 ASTHMA, UNSPECIFIED ASTHMA SEVERITY, UNSPECIFIED WHETHER COMPLICATED, UNSPECIFIED WHETHER PERSISTENT: ICD-10-CM

## 2024-08-02 RX ORDER — TIOTROPIUM BROMIDE 18 UG/1
CAPSULE ORAL; RESPIRATORY (INHALATION)
Qty: 30 CAPSULE | Refills: 6 | Status: SHIPPED | OUTPATIENT
Start: 2024-08-02

## 2024-09-03 DIAGNOSIS — J45.909 ASTHMA, UNSPECIFIED ASTHMA SEVERITY, UNSPECIFIED WHETHER COMPLICATED, UNSPECIFIED WHETHER PERSISTENT: ICD-10-CM

## 2024-09-05 RX ORDER — ALBUTEROL SULFATE 90 UG/1
INHALANT RESPIRATORY (INHALATION)
Qty: 6.7 G | Refills: 11 | Status: SHIPPED | OUTPATIENT
Start: 2024-09-05

## 2024-09-28 DIAGNOSIS — I10 PRIMARY HYPERTENSION: ICD-10-CM

## 2024-09-28 DIAGNOSIS — J45.50 SEVERE PERSISTENT ASTHMA WITHOUT COMPLICATION: ICD-10-CM

## 2024-09-28 DIAGNOSIS — R09.81 NASAL CONGESTION: ICD-10-CM

## 2024-09-30 RX ORDER — BUDESONIDE AND FORMOTEROL FUMARATE DIHYDRATE 160; 4.5 UG/1; UG/1
2 AEROSOL RESPIRATORY (INHALATION) 2 TIMES DAILY
Qty: 10.2 G | Refills: 5 | Status: SHIPPED | OUTPATIENT
Start: 2024-09-30

## 2024-09-30 RX ORDER — FLUTICASONE PROPIONATE 50 MCG
SPRAY, SUSPENSION (ML) NASAL
Qty: 48 ML | Refills: 2 | Status: SHIPPED | OUTPATIENT
Start: 2024-09-30

## 2024-09-30 RX ORDER — LOSARTAN POTASSIUM 50 MG/1
50 TABLET ORAL
Qty: 90 TABLET | Refills: 1 | Status: SHIPPED | OUTPATIENT
Start: 2024-09-30

## 2024-10-03 DIAGNOSIS — D50.9 IRON DEFICIENCY ANEMIA, UNSPECIFIED IRON DEFICIENCY ANEMIA TYPE: ICD-10-CM

## 2024-10-03 DIAGNOSIS — Z11.4 ENCOUNTER FOR SCREENING FOR HIV: ICD-10-CM

## 2024-10-03 DIAGNOSIS — Z00.00 WELLNESS EXAMINATION: Primary | ICD-10-CM

## 2024-10-03 DIAGNOSIS — I10 PRIMARY HYPERTENSION: ICD-10-CM

## 2024-10-03 DIAGNOSIS — Z11.59 NEED FOR HEPATITIS C SCREENING TEST: ICD-10-CM

## 2024-10-10 ENCOUNTER — LAB VISIT (OUTPATIENT)
Dept: LAB | Facility: HOSPITAL | Age: 44
End: 2024-10-10
Attending: FAMILY MEDICINE
Payer: MEDICAID

## 2024-10-10 DIAGNOSIS — Z00.00 WELLNESS EXAMINATION: ICD-10-CM

## 2024-10-10 DIAGNOSIS — I10 PRIMARY HYPERTENSION: ICD-10-CM

## 2024-10-10 DIAGNOSIS — Z11.59 NEED FOR HEPATITIS C SCREENING TEST: ICD-10-CM

## 2024-10-10 DIAGNOSIS — Z11.4 ENCOUNTER FOR SCREENING FOR HIV: ICD-10-CM

## 2024-10-10 DIAGNOSIS — J45.909 ASTHMA, UNSPECIFIED ASTHMA SEVERITY, UNSPECIFIED WHETHER COMPLICATED, UNSPECIFIED WHETHER PERSISTENT: ICD-10-CM

## 2024-10-10 DIAGNOSIS — D50.9 IRON DEFICIENCY ANEMIA, UNSPECIFIED IRON DEFICIENCY ANEMIA TYPE: ICD-10-CM

## 2024-10-10 LAB
ALBUMIN SERPL-MCNC: 3.3 G/DL (ref 3.5–5)
ALBUMIN/GLOB SERPL: 0.9 RATIO (ref 1.1–2)
ALP SERPL-CCNC: 85 UNIT/L (ref 40–150)
ALT SERPL-CCNC: 34 UNIT/L (ref 0–55)
ANION GAP SERPL CALC-SCNC: 9 MEQ/L
AST SERPL-CCNC: 22 UNIT/L (ref 5–34)
BASOPHILS # BLD AUTO: 0.01 X10(3)/MCL
BASOPHILS NFR BLD AUTO: 0.1 %
BILIRUB SERPL-MCNC: 0.6 MG/DL
BUN SERPL-MCNC: 7 MG/DL (ref 7–18.7)
CALCIUM SERPL-MCNC: 9.5 MG/DL (ref 8.4–10.2)
CHLORIDE SERPL-SCNC: 107 MMOL/L (ref 98–107)
CHOLEST SERPL-MCNC: 144 MG/DL
CHOLEST/HDLC SERPL: 4 {RATIO} (ref 0–5)
CO2 SERPL-SCNC: 28 MMOL/L (ref 22–29)
CREAT SERPL-MCNC: 0.75 MG/DL (ref 0.55–1.02)
CREAT/UREA NIT SERPL: 9
EOSINOPHIL # BLD AUTO: 0.2 X10(3)/MCL (ref 0–0.9)
EOSINOPHIL NFR BLD AUTO: 2.9 %
ERYTHROCYTE [DISTWIDTH] IN BLOOD BY AUTOMATED COUNT: 14.1 % (ref 11.5–17)
GFR SERPLBLD CREATININE-BSD FMLA CKD-EPI: >60 ML/MIN/1.73/M2
GLOBULIN SER-MCNC: 3.6 GM/DL (ref 2.4–3.5)
GLUCOSE SERPL-MCNC: 118 MG/DL (ref 74–100)
HCT VFR BLD AUTO: 39 % (ref 37–47)
HCV AB SERPL QL IA: NONREACTIVE
HDLC SERPL-MCNC: 34 MG/DL (ref 35–60)
HGB BLD-MCNC: 13.1 G/DL (ref 12–16)
HIV 1+2 AB+HIV1 P24 AG SERPL QL IA: NONREACTIVE
IMM GRANULOCYTES # BLD AUTO: 0.04 X10(3)/MCL (ref 0–0.04)
IMM GRANULOCYTES NFR BLD AUTO: 0.6 %
LDLC SERPL CALC-MCNC: 91 MG/DL (ref 50–140)
LYMPHOCYTES # BLD AUTO: 0.98 X10(3)/MCL (ref 0.6–4.6)
LYMPHOCYTES NFR BLD AUTO: 14 %
MCH RBC QN AUTO: 27.5 PG (ref 27–31)
MCHC RBC AUTO-ENTMCNC: 33.6 G/DL (ref 33–36)
MCV RBC AUTO: 81.8 FL (ref 80–94)
MONOCYTES # BLD AUTO: 0.51 X10(3)/MCL (ref 0.1–1.3)
MONOCYTES NFR BLD AUTO: 7.3 %
NEUTROPHILS # BLD AUTO: 5.26 X10(3)/MCL (ref 2.1–9.2)
NEUTROPHILS NFR BLD AUTO: 75.1 %
NRBC BLD AUTO-RTO: 0 %
PLATELET # BLD AUTO: 153 X10(3)/MCL (ref 130–400)
PMV BLD AUTO: 9.1 FL (ref 7.4–10.4)
POTASSIUM SERPL-SCNC: 3.8 MMOL/L (ref 3.5–5.1)
PROT SERPL-MCNC: 6.9 GM/DL (ref 6.4–8.3)
RBC # BLD AUTO: 4.77 X10(6)/MCL (ref 4.2–5.4)
SODIUM SERPL-SCNC: 144 MMOL/L (ref 136–145)
TRIGL SERPL-MCNC: 96 MG/DL (ref 37–140)
TSH SERPL-ACNC: 1.41 UIU/ML (ref 0.35–4.94)
VLDLC SERPL CALC-MCNC: 19 MG/DL
WBC # BLD AUTO: 7 X10(3)/MCL (ref 4.5–11.5)

## 2024-10-10 PROCEDURE — 84443 ASSAY THYROID STIM HORMONE: CPT

## 2024-10-10 PROCEDURE — 80053 COMPREHEN METABOLIC PANEL: CPT

## 2024-10-10 PROCEDURE — 80061 LIPID PANEL: CPT

## 2024-10-10 PROCEDURE — 36415 COLL VENOUS BLD VENIPUNCTURE: CPT

## 2024-10-10 PROCEDURE — 87389 HIV-1 AG W/HIV-1&-2 AB AG IA: CPT

## 2024-10-10 PROCEDURE — 86803 HEPATITIS C AB TEST: CPT

## 2024-10-10 PROCEDURE — 85025 COMPLETE CBC W/AUTO DIFF WBC: CPT

## 2024-10-10 RX ORDER — IPRATROPIUM BROMIDE AND ALBUTEROL SULFATE 2.5; .5 MG/3ML; MG/3ML
SOLUTION RESPIRATORY (INHALATION)
Qty: 180 ML | Refills: 5 | Status: SHIPPED | OUTPATIENT
Start: 2024-10-10

## 2024-10-11 ENCOUNTER — OFFICE VISIT (OUTPATIENT)
Dept: FAMILY MEDICINE | Facility: CLINIC | Age: 44
End: 2024-10-11
Payer: MEDICAID

## 2024-10-11 VITALS
RESPIRATION RATE: 20 BRPM | TEMPERATURE: 98 F | HEIGHT: 59 IN | HEART RATE: 88 BPM | OXYGEN SATURATION: 95 % | SYSTOLIC BLOOD PRESSURE: 132 MMHG | DIASTOLIC BLOOD PRESSURE: 80 MMHG | BODY MASS INDEX: 52.58 KG/M2 | WEIGHT: 260.81 LBS

## 2024-10-11 DIAGNOSIS — J06.9 ACUTE URI: Primary | ICD-10-CM

## 2024-10-11 DIAGNOSIS — Z23 FLU VACCINE NEED: ICD-10-CM

## 2024-10-11 DIAGNOSIS — G62.9 NEUROPATHY: ICD-10-CM

## 2024-10-11 PROCEDURE — 90471 IMMUNIZATION ADMIN: CPT | Mod: ,,,

## 2024-10-11 PROCEDURE — 99213 OFFICE O/P EST LOW 20 MIN: CPT | Mod: 25,,,

## 2024-10-11 PROCEDURE — 90656 IIV3 VACC NO PRSV 0.5 ML IM: CPT | Mod: ,,,

## 2024-10-11 RX ORDER — GABAPENTIN 300 MG/1
300 CAPSULE ORAL 3 TIMES DAILY
Qty: 90 CAPSULE | Refills: 5 | Status: SHIPPED | OUTPATIENT
Start: 2024-10-11 | End: 2025-04-09

## 2024-10-11 RX ORDER — METHYLPREDNISOLONE 4 MG/1
TABLET ORAL
Qty: 1 EACH | Refills: 0 | Status: SHIPPED | OUTPATIENT
Start: 2024-10-11

## 2024-10-11 NOTE — PROGRESS NOTES
Patient ID: [id number]     Chief Complaint: Follow-up ([facility] er follow up /Went to er last week for ear pain/Was informed she had fluid in her ears and medication was prescribed/She stated her symptoms have not improved), Cough (Throat pain with coughing/Chest congestion ), and Foot Pain (States gabapentin is not helping with her feet pain and numbness)    HPI:     Mellissa Ann Milligan is a 44 y.o. female here today for symptoms of URI. She is unsure if she has ran any fevers and denies any sick contacts. She reports that she was seen in the ER last week and was diagnosed with an ear infection. She was prescribed Amoxicillin last week. She also is having continuous foot pain due to neuropathy. She was previously started on Gabapentin and reports that symptoms are improving but would like to discuss increasing her dosage.     Past Medical History:   Diagnosis Date    Asthma     Chronic sinusitis, unspecified     Dietary iron deficiency     Essential (primary) hypertension     Eustachian tube dysfunction, bilateral     DOMINIQUE (iron deficiency anemia)     Leukocytosis     Morbid obesity     Nasal congestion     Nasal polyps     Obesity hypoventilation syndrome     Obstructive sleep apnea     JOSEPH on CPAP     Prediabetes         Past Surgical History:   Procedure Laterality Date     SECTION      CHOLECYSTECTOMY      COLONOSCOPY  07/15/2021    Dr Ricky Hale    ESOPHAGOGASTRODUODENOSCOPY  2021    Dr Ricky Hale    FESS, WITH DACRYOCYSTORHINOSTOMY      NASAL TURBINATE REDUCTION      SINUS SURGERY          Social History     Socioeconomic History    Marital status:    Tobacco Use    Smoking status: Never    Smokeless tobacco: Never   Substance and Sexual Activity    Alcohol use: Never    Drug use: Never    Sexual activity: Not Currently     Social Drivers of Health     Financial Resource Strain: Medium Risk (10/13/2023)    Overall Financial Resource Strain (CARDIA)     Difficulty of Paying Living Expenses:  Somewhat hard   Food Insecurity: No Food Insecurity (10/13/2023)    Hunger Vital Sign     Worried About Running Out of Food in the Last Year: Never true     Ran Out of Food in the Last Year: Never true   Transportation Needs: No Transportation Needs (10/13/2023)    PRAPARE - Transportation     Lack of Transportation (Medical): No     Lack of Transportation (Non-Medical): No   Physical Activity: Inactive (10/13/2023)    Exercise Vital Sign     Days of Exercise per Week: 0 days     Minutes of Exercise per Session: 0 min   Stress: No Stress Concern Present (10/13/2023)    [employer] [place] [employer] - Occupational Stress Questionnaire     Feeling of Stress : Not at all   Housing Stability: Low Risk  (10/13/2023)    Housing Stability Vital Sign     Unable to Pay for Housing in the Last Year: No     Number of Places Lived in the Last Year: 1     Unstable Housing in the Last Year: No        Current Outpatient Medications   Medication Instructions    albuterol (PROVENTIL/VENTOLIN HFA) 90 mcg/actuation inhaler INHALE 2 PUFFS BY MOUTH EVERY 4 HOURS AS NEEDED FOR WHEEZE OR FOR SHORTNESS OF BREATH    albuterol-ipratropium (DUO-NEB) 2.5 mg-0.5 mg/3 mL nebulizer solution NEBULIZE 1 VIAL 4 TIMES DAILY    budesonide-formoterol 160-4.5 mcg (SYMBICORT) 160-4.5 mcg/actuation HFAA 2 puffs, 2 times daily    ferrous gluconate (FERGON) 324 MG tablet TAKE 1 TABLET BY MOUTH TWICE A DAY    fluticasone propionate (FLONASE) 50 mcg/actuation nasal spray SPRAY 2 SPRAYS INTO EACH NOSTRIL ONCE DAILY.    gabapentin (NEURONTIN) 300 mg, Oral, 3 times daily    losartan (COZAAR) 50 mg, Oral    methylPREDNISolone (MEDROL DOSEPACK) 4 mg tablet use as directed    pantoprazole (PROTONIX) 40 mg, Oral, Daily    tiotropium (SPIRIVA WITH HANDIHALER) 18 mcg inhalation capsule INHALE 2 INHALATIONS OF ONE CAPSULE ONCE DAILY       Review of patient's allergies indicates:  No Known Allergies     Patient Care Team:  Sebastian Davila DO as PCP -  "General (Family Medicine)  Thee Raymond MD as Consulting Physician (Otolaryngology)  Ricky Hale MD as Consulting Physician (Gastroenterology)  Marco Armstrong MD as Consulting Physician (Pulmonary Disease)     Subjective:     Review of Systems    12 point review of systems conducted, negative except as stated in the history of present illness. See HPI for details.    Objective:     Visit Vitals  /80   Pulse 88   Temp 98.3 °F (36.8 °C)   Resp 20   Ht 4' 11" (1.499 m)   Wt 118.3 kg (260 lb 12.8 oz)   SpO2 95%   BMI 52.68 kg/m²       Physical Exam  Vitals and nursing note reviewed.   Constitutional:       General: She is not in acute distress.     Appearance: Normal appearance. She is not toxic-appearing.   HENT:      Head: Normocephalic.      Right Ear: Hearing, tympanic membrane, ear canal and external ear normal.      Left Ear: Hearing, tympanic membrane, ear canal and external ear normal.      Nose: Congestion present.      Right Turbinates: Enlarged.      Left Turbinates: Enlarged.      Mouth/Throat:      Mouth: Mucous membranes are moist.      Pharynx: Oropharynx is clear. Posterior oropharyngeal erythema and postnasal drip present. No pharyngeal swelling.      Tonsils: No tonsillar exudate.   Eyes:      Extraocular Movements: Extraocular movements intact.      Pupils: Pupils are equal, round, and reactive to light.   Cardiovascular:      Rate and Rhythm: Normal rate and regular rhythm.      Pulses: Normal pulses.   Pulmonary:      Effort: Pulmonary effort is normal. No respiratory distress.      Breath sounds: Normal breath sounds. No stridor or decreased air movement. No wheezing, rhonchi or rales.   Abdominal:      General: Abdomen is flat. Bowel sounds are normal. There is no distension.      Palpations: Abdomen is soft. There is no mass.      Tenderness: There is no abdominal tenderness.   Musculoskeletal:      Cervical back: Neck supple.   Lymphadenopathy:      Cervical: No cervical " adenopathy.   Skin:     General: Skin is warm and dry.   Neurological:      General: No focal deficit present.      Mental Status: She is alert and oriented to person, place, and time.       Labs Reviewed:     Chemistry:  Lab Results   Component Value Date     10/10/2024    K 3.8 10/10/2024    BUN 7.0 10/10/2024    CREATININE 0.75 10/10/2024    EGFRNORACEVR >60 10/10/2024    GLUCOSE 118 (H) 10/10/2024    CALCIUM 9.5 10/10/2024    ALKPHOS 85 10/10/2024    LABPROT 6.9 10/10/2024    ALBUMIN 3.3 (L) 10/10/2024    BILIDIR 0.1 09/30/2021    IBILI 0.10 09/30/2021    AST 22 10/10/2024    ALT 34 10/10/2024    [id number] 30.1 10/03/2023    TSH 1.411 10/10/2024    [id number] 1.25 08/24/2017        Lab Results   Component Value Date    HGBA1C 6.1 04/15/2024        Hematology:  Lab Results   Component Value Date    WBC 7.00 10/10/2024    HGB 13.1 10/10/2024    HCT 39.0 10/10/2024     10/10/2024       Lipid Panel:  Lab Results   Component Value Date    CHOL 144 10/10/2024    HDL 34 (L) 10/10/2024    LDL 91.00 10/10/2024    TRIG 96 10/10/2024    TOTALCHOLEST 4 10/10/2024        Urine:  Lab Results   Component Value Date    APPEARANCEUA CLEAR 07/02/2019    PROTEINUA Negative 07/02/2019    LEUKOCYTESUR Negative 07/02/2019    RBCUA 0-3 07/02/2019    WBCUA None Seen 07/02/2019    BACTERIA Rare (A) 07/02/2019      Assessment:       ICD-10-CM ICD-9-CM   1. Acute URI  J06.9 465.9   2. Neuropathy  G62.9 355.9   3. Flu vaccine need  Z23 V04.81      Plan:     1. Acute URI  Assessment & Plan:  The patient recently completed a course of Amoxicillin.   Start Medrol Dose Pack.   Use OTC cold meds for symptoms (HTN and DM pt to avoid Sudafed or pseudoephedrine products).  Use OTC Tylenol or Advil for fever or body aches.  Get plenty of rest, eat a healthy diet, avoid alcohol and smoking.  Sore Throat: Use OTC lozenges or throat sprays, gargle with warm salt and water, warm tea with honey.  Nasal Congestion: Use OTC Mucinex D,  humidifier or steamy shower.  Cough: Use Dextromethorphan/Doxylamine Cough Syrup at night.  Report fever greater than 101 F, breathing problems, painful or worsening cough, or changes in mucous (green, yellow, bloody).  Cover your mouth with coughing, avoid sharing cups or lip balm, wash your hand before eating or touching your face.    Orders:  -     methylPREDNISolone (MEDROL DOSEPACK) 4 mg tablet; use as directed  Dispense: 1 each; Refill: 0    2. Neuropathy  Assessment & Plan:  Increase Gabapentin to 300 mg TID.     Orders:  -     gabapentin (NEURONTIN) 300 MG capsule; Take 1 capsule (300 mg total) by mouth 3 (three) times daily.  Dispense: 90 capsule; Refill: 5    3. Flu vaccine need  -     influenza (Flulaval, Fluzone, Fluarix) 45 mcg/0.5 mL IM vaccine (> or = 6 mo) 0.5 mL      Follow up for Keep Scheduled Appointment for Wellness. . In addition to their scheduled follow up, the patient has also been instructed to follow up on as needed basis.     Future Appointments   Date Time Provider Department Center   10/15/2024 10:20 AM Star Odonnell NP [facility]     Star Odonnell NP

## 2024-10-11 NOTE — PATIENT INSTRUCTIONS
Medrol Dose Pack as directed + Flonase.   Drink plenty of fluids.   Get plenty of rest.   Tylenol or Motrin as needed. .

## 2024-10-12 PROBLEM — J06.9 ACUTE URI: Status: ACTIVE | Noted: 2024-10-12

## 2024-10-12 PROBLEM — G62.9 NEUROPATHY: Status: ACTIVE | Noted: 2024-04-15

## 2024-10-12 NOTE — ASSESSMENT & PLAN NOTE
The patient recently completed a course of Amoxicillin.   Start Medrol Dose Pack.   Use OTC cold meds for symptoms (HTN and DM pt to avoid Sudafed or pseudoephedrine products).  Use OTC Tylenol or Advil for fever or body aches.  Get plenty of rest, eat a healthy diet, avoid alcohol and smoking.  Sore Throat: Use OTC lozenges or throat sprays, gargle with warm salt and water, warm tea with honey.  Nasal Congestion: Use OTC Mucinex D, humidifier or steamy shower.  Cough: Use Dextromethorphan/Doxylamine Cough Syrup at night.  Report fever greater than 101 F, breathing problems, painful or worsening cough, or changes in mucous (green, yellow, bloody).  Cover your mouth with coughing, avoid sharing cups or lip balm, wash your hand before eating or touching your face.

## 2024-10-15 ENCOUNTER — HOSPITAL ENCOUNTER (OUTPATIENT)
Dept: RADIOLOGY | Facility: HOSPITAL | Age: 44
Discharge: HOME | End: 2024-10-15
Payer: MEDICAID

## 2024-10-15 ENCOUNTER — OFFICE VISIT (OUTPATIENT)
Dept: FAMILY MEDICINE | Facility: CLINIC | Age: 44
End: 2024-10-15
Payer: MEDICAID

## 2024-10-15 VITALS
DIASTOLIC BLOOD PRESSURE: 67 MMHG | HEART RATE: 96 BPM | OXYGEN SATURATION: 95 % | BODY MASS INDEX: 51.48 KG/M2 | RESPIRATION RATE: 20 BRPM | SYSTOLIC BLOOD PRESSURE: 100 MMHG | HEIGHT: 59 IN | TEMPERATURE: 98 F | WEIGHT: 255.38 LBS

## 2024-10-15 DIAGNOSIS — Z00.00 WELLNESS EXAMINATION: Primary | ICD-10-CM

## 2024-10-15 DIAGNOSIS — E11.42 TYPE 2 DIABETES MELLITUS WITH DIABETIC POLYNEUROPATHY, WITHOUT LONG-TERM CURRENT USE OF INSULIN: ICD-10-CM

## 2024-10-15 DIAGNOSIS — J06.9 ACUTE URI: ICD-10-CM

## 2024-10-15 DIAGNOSIS — J45.50 SEVERE PERSISTENT ASTHMA WITHOUT COMPLICATION: Chronic | ICD-10-CM

## 2024-10-15 DIAGNOSIS — E66.813 CLASS 3 OBESITY WITH ALVEOLAR HYPOVENTILATION, SERIOUS COMORBIDITY, AND BODY MASS INDEX (BMI) OF 50.0 TO 59.9 IN ADULT: ICD-10-CM

## 2024-10-15 DIAGNOSIS — E66.2 CLASS 3 OBESITY WITH ALVEOLAR HYPOVENTILATION, SERIOUS COMORBIDITY, AND BODY MASS INDEX (BMI) OF 50.0 TO 59.9 IN ADULT: ICD-10-CM

## 2024-10-15 PROBLEM — E11.65 TYPE 2 DIABETES MELLITUS WITH HYPERGLYCEMIA, WITHOUT LONG-TERM CURRENT USE OF INSULIN: Status: ACTIVE | Noted: 2024-04-15

## 2024-10-15 PROBLEM — E66.01 MORBID OBESITY: Status: RESOLVED | Noted: 2022-09-21 | Resolved: 2024-10-15

## 2024-10-15 LAB — GLUCOSE SERPL-MCNC: 310 MG/DL (ref 70–110)

## 2024-10-15 PROCEDURE — 71046 X-RAY EXAM CHEST 2 VIEWS: CPT | Mod: TC

## 2024-10-15 RX ORDER — LANCETS
EACH MISCELLANEOUS
Qty: 120 EACH | Refills: 11 | Status: SHIPPED | OUTPATIENT
Start: 2024-10-15

## 2024-10-15 RX ORDER — DOXYCYCLINE 100 MG/1
100 CAPSULE ORAL EVERY 12 HOURS
Qty: 14 CAPSULE | Refills: 0 | Status: SHIPPED | OUTPATIENT
Start: 2024-10-15 | End: 2024-10-22

## 2024-10-15 RX ORDER — METFORMIN HYDROCHLORIDE 500 MG/1
500 TABLET, EXTENDED RELEASE ORAL
Qty: 90 TABLET | Refills: 3 | Status: SHIPPED | OUTPATIENT
Start: 2024-10-15 | End: 2025-10-15

## 2024-10-15 RX ORDER — INSULIN PUMP SYRINGE, 3 ML
EACH MISCELLANEOUS
Qty: 1 EACH | Refills: 0 | Status: SHIPPED | OUTPATIENT
Start: 2024-10-15

## 2024-10-15 NOTE — ASSESSMENT & PLAN NOTE
Dietician and Diabetes Education ordered today.   Advised the patient to avoid eating little martina snacks.   Home health ordered.     Body mass index is 51.58 kg/m².  Goal BMI <30.  Exercise 5 times a week for 30 minutes per day.  Avoid soda, simple sugars, excessive rice, potatoes or bread. Limit fast foods and fried foods.  Choose complex carbs in moderation (example: green vegetables, beans, oatmeal). Eat plenty of fresh fruits and vegetables with lean meats daily.  Do not skip meals. Eat a balanced portion size.  Avoid fad diets. Consider permanent healthy life style changes.

## 2024-10-15 NOTE — ASSESSMENT & PLAN NOTE
Lab Results   Component Value Date    HGBA1C 6.1 04/15/2024    HGBA1C 5.5 10/03/2023    LDL 91.00 10/10/2024    CREATININE 0.75 10/10/2024      Fasting blood sugars are consistent with DM II diagnosis.   Newly diagnosed today.   Start Metformin 500 mg ER daily.   Check blood sugars every morning + every evening.   Home Health ordered, Nutrition services ordered.   RTC in three months with fasting labs prior.     Follow ADA Diet. Avoid soda, simple sweets, and limit rice/pasta/breads/starches (no more than 45-50 grams per meal).  Maintain healthy weight with goal BMI <30.  Exercise 5 times per week for 30 minutes per day.

## 2024-10-15 NOTE — PROGRESS NOTES
Patient ID: [id number]     Chief Complaint: Annual Exam (Would like pneumonia vaccine)    HPI:     Mellissa Ann Milligan is a 44 y.o. female here today for an annual wellness visit. Reviewed and discussed lab results. She is still experiencing symptoms of URI. She is still currently taking steroid taper and is using her nebulizer.     Past Medical History:   Diagnosis Date    Asthma     Chronic sinusitis, unspecified     Dietary iron deficiency     Essential (primary) hypertension     Eustachian tube dysfunction, bilateral     DOMINIQUE (iron deficiency anemia)     Leukocytosis     Morbid obesity     Nasal congestion     Nasal polyps     Obesity hypoventilation syndrome     Obstructive sleep apnea     JOSEPH on CPAP     Prediabetes       Past Surgical History:   Procedure Laterality Date     SECTION      CHOLECYSTECTOMY      COLONOSCOPY  07/15/2021    Dr Ricky Hale    ESOPHAGOGASTRODUODENOSCOPY  2021    Dr Ricky Hale    FESS, WITH DACRYOCYSTORHINOSTOMY      NASAL TURBINATE REDUCTION      SINUS SURGERY        Social History     Socioeconomic History    Marital status:    Tobacco Use    Smoking status: Never    Smokeless tobacco: Never   Substance and Sexual Activity    Alcohol use: Never    Drug use: Never    Sexual activity: Not Currently     Social Drivers of Health     Financial Resource Strain: Medium Risk (10/13/2023)    Overall Financial Resource Strain (CARDIA)     Difficulty of Paying Living Expenses: Somewhat hard   Food Insecurity: No Food Insecurity (10/13/2023)    Hunger Vital Sign     Worried About Running Out of Food in the Last Year: Never true     Ran Out of Food in the Last Year: Never true   Transportation Needs: No Transportation Needs (10/13/2023)    PRAPARE - Transportation     Lack of Transportation (Medical): No     Lack of Transportation (Non-Medical): No   Physical Activity: Inactive (10/13/2023)    Exercise Vital Sign     Days of Exercise per Week: 0 days     Minutes of Exercise  per Session: 0 min   Stress: No Stress Concern Present (10/13/2023)    [employer] [place] [employer] - Occupational Stress Questionnaire     Feeling of Stress : Not at all   Housing Stability: Low Risk  (10/13/2023)    Housing Stability Vital Sign     Unable to Pay for Housing in the Last Year: No     Number of Places Lived in the Last Year: 1     Unstable Housing in the Last Year: No     Current Outpatient Medications   Medication Instructions    albuterol (PROVENTIL/VENTOLIN HFA) 90 mcg/actuation inhaler INHALE 2 PUFFS BY MOUTH EVERY 4 HOURS AS NEEDED FOR WHEEZE OR FOR SHORTNESS OF BREATH    albuterol-ipratropium (DUO-NEB) 2.5 mg-0.5 mg/3 mL nebulizer solution NEBULIZE 1 VIAL 4 TIMES DAILY    blood sugar diagnostic Strp To check BG twice daily, to use with insurance preferred meter    blood-glucose meter kit To check BG daily, to use with insurance preferred meter    budesonide-formoterol 160-4.5 mcg (SYMBICORT) 160-4.5 mcg/actuation HFAA 2 puffs, 2 times daily    doxycycline (VIBRAMYCIN) 100 mg, Oral, Every 12 hours    ferrous gluconate (FERGON) 324 MG tablet TAKE 1 TABLET BY MOUTH TWICE A DAY    fluticasone propionate (FLONASE) 50 mcg/actuation nasal spray SPRAY 2 SPRAYS INTO EACH NOSTRIL ONCE DAILY.    gabapentin (NEURONTIN) 300 mg, Oral, 3 times daily    lancets Misc To check BG daily, to use with insurance preferred meter    losartan (COZAAR) 50 mg, Oral    metFORMIN (GLUCOPHAGE-XR) 500 mg, Oral, With breakfast    methylPREDNISolone (MEDROL DOSEPACK) 4 mg tablet use as directed    pantoprazole (PROTONIX) 40 mg, Oral, Daily    tiotropium (SPIRIVA WITH HANDIHALER) 18 mcg inhalation capsule INHALE 2 INHALATIONS OF ONE CAPSULE ONCE DAILY     Review of patient's allergies indicates:  No Known Allergies     Patient Care Team:  Sebastian Davila DO as PCP - General (Family Medicine)  Thee Raymond MD as Consulting Physician (Otolaryngology)  Ricky Hale MD as Consulting Physician  "(Gastroenterology)  Marco Armstrong MD as Consulting Physician (Pulmonary Disease)     Subjective:     Review of Systems    12 point review of systems conducted, negative except as stated in the history of present illness. See HPI for details.    Objective:     Visit Vitals  /67   Pulse 96   Temp 97.9 °F (36.6 °C)   Resp 20   Ht 4' 11" (1.499 m)   Wt 115.8 kg (255 lb 6.4 oz)   LMP 09/18/2024   SpO2 95%   BMI 51.58 kg/m²       Physical Exam  Vitals and nursing note reviewed.   Constitutional:       General: She is not in acute distress.     Appearance: She is obese. She is not ill-appearing.   HENT:      Head: Normocephalic and atraumatic.      Mouth/Throat:      Mouth: Mucous membranes are moist.      Pharynx: Oropharynx is clear.   Eyes:      General: No scleral icterus.     Extraocular Movements: Extraocular movements intact.      Conjunctiva/sclera: Conjunctivae normal.      Pupils: Pupils are equal, round, and reactive to light.   Neck:      Vascular: No carotid bruit.   Cardiovascular:      Rate and Rhythm: Normal rate and regular rhythm.      Heart sounds: No murmur heard.     No friction rub. No gallop.   Pulmonary:      Effort: Pulmonary effort is normal. No respiratory distress.      Breath sounds: Normal breath sounds. No wheezing, rhonchi or rales.   Abdominal:      General: Abdomen is flat. Bowel sounds are normal. There is no distension.      Palpations: Abdomen is soft. There is no mass.      Tenderness: There is no abdominal tenderness.   Musculoskeletal:         General: Normal range of motion.      Cervical back: Normal range of motion and neck supple.   Skin:     General: Skin is warm and dry.   Neurological:      General: No focal deficit present.      Mental Status: She is alert.   Psychiatric:         Mood and Affect: Mood normal.         Labs Reviewed:     Chemistry:  Lab Results   Component Value Date     10/10/2024    K 3.8 10/10/2024    BUN 7.0 10/10/2024    CREATININE 0.75 " 10/10/2024    EGFRNORACEVR >60 10/10/2024    GLUCOSE 118 (H) 10/10/2024    CALCIUM 9.5 10/10/2024    ALKPHOS 85 10/10/2024    LABPROT 6.9 10/10/2024    ALBUMIN 3.3 (L) 10/10/2024    BILIDIR 0.1 09/30/2021    IBILI 0.10 09/30/2021    AST 22 10/10/2024    ALT 34 10/10/2024    VBDXLDNQ35QE 30.1 10/03/2023    TSH 1.411 10/10/2024    DDATQX5LZMU 1.25 08/24/2017        Lab Results   Component Value Date    HGBA1C 6.1 04/15/2024        Hematology:  Lab Results   Component Value Date    WBC 7.00 10/10/2024    HGB 13.1 10/10/2024    HCT 39.0 10/10/2024     10/10/2024       Lipid Panel:  Lab Results   Component Value Date    CHOL 144 10/10/2024    HDL 34 (L) 10/10/2024    LDL 91.00 10/10/2024    TRIG 96 10/10/2024    TOTALCHOLEST 4 10/10/2024        X-Ray Chest PA And Lateral  Order: [id number]  Status: Final result       Visible to patient: Yes (seen)       Next appt: 11/04/2024 at 09:30 AM in Radiology ([facility] [id number] SCREEN)       Dx: Severe persistent asthma without comp...    0 Result Notes       1 Patient Communication  Details    Reading Physician Reading Date Result Priority   Julio Cesar Hernández MD  [phone] 10/15/2024 STAT     Narrative & Impression  EXAMINATION:  XR CHEST PA AND LATERAL     CLINICAL HISTORY:  , Severe persistent asthma, uncomplicated.     COMPARISON:  July 18, 2022     FINDINGS:  No alveolar consolidation, effusion, or pneumothorax is seen.   The thoracic aorta is normal  cardiac silhouette, central pulmonary vessels and mediastinum are normal in size and are grossly unremarkable.   visualized osseous structures are grossly unremarkable.     Impression:     No acute chest disease is identified.        Electronically signed by:Julio Cesar Hernández  Date:                                            10/15/2024  Time:                                           12:05       Assessment:       ICD-10-CM ICD-9-CM   1. Wellness examination  Z00.00 V70.0   2. Acute URI  J06.9 465.9   3. Severe persistent  asthma without complication  J45.50 493.90   4. Type 2 diabetes mellitus with diabetic polyneuropathy, without long-term current use of insulin  E11.42 250.60     357.2   5. Class 3 obesity with alveolar hypoventilation, serious comorbidity, and body mass index (BMI) of 50.0 to 59.9 in adult  E66.813 278.03    E66.2 V85.43    Z68.43         Plan:     Cervical Cancer Screening -  Last Pap on 9/22/2020. HPV negative. Repeat in 2025.     Breast Cancer Screening - Last Mammogram on 11/1/2023. Normal. Scheduled to repeat on 11/4/2024.    Colon Cancer Screening - Recommended at age 45.     Osteoporosis Screening - Recommended at age 65.     Eye Exam - Recommend annually.    Dental Exam - Recommend biannual exams.     Vaccinations -   Immunization History   Administered Date(s) Administered    COVID-19, MRNA, LN-S, PF (Pfizer) (Purple Cap) 03/27/2021, 04/17/2021    Influenza - Quadrivalent 09/22/2020, 09/30/2021    Influenza - Quadrivalent - MDCK - PF 10/14/2019    Influenza - Quadrivalent - PF *Preferred* (6 months and older) 11/15/2002, 10/07/2022, 10/13/2023    Influenza - Trivalent - Fluarix, Flulaval, Fluzone, Afluria - PF 11/15/2002, 10/01/2019, 10/11/2024    Pneumococcal Polysaccharide - 23 Valent 12/17/2019, 09/30/2021    Tdap 09/27/2020          1. Wellness examination    2. Acute URI  -     doxycycline (VIBRAMYCIN) 100 MG Cap; Take 1 capsule (100 mg total) by mouth every 12 (twelve) hours. for 7 days  Dispense: 14 capsule; Refill: 0    3. Severe persistent asthma without complication  Assessment & Plan:  Continue Albuterol HFA QID PRN for wheezing +  Spirivia + Symbicort.   Avoid/limit triggers such as pollen, dust, mold and animal dander.  Avoid smoke, strong chemicals, and strong cleaning products in addition to strong perfumes/colognes.  Use rescue inhaler when needed, use nebulizer for wheezing or URI.  Report upper respiratory infections early and seek treatment.  Asthma Action Plan reviewed.        Orders:  -     Pneumococcal Conjugate Vaccine (20 Valent) (IM)(Preferred)  -     X-Ray Chest PA And Lateral; Future; Expected date: 10/15/2024    4. Type 2 diabetes mellitus with diabetic polyneuropathy, without long-term current use of insulin  Assessment & Plan:  Lab Results   Component Value Date    HGBA1C 6.1 04/15/2024    HGBA1C 5.5 10/03/2023    LDL 91.00 10/10/2024    CREATININE 0.75 10/10/2024      Fasting blood sugars are consistent with DM II diagnosis.   Newly diagnosed today.   Start Metformin 500 mg ER daily.   Check blood sugars every morning + every evening.   Home Health ordered, Nutrition services ordered.   RTC in three months with fasting labs prior.     Follow ADA Diet. Avoid soda, simple sweets, and limit rice/pasta/breads/starches (no more than 45-50 grams per meal).  Maintain healthy weight with goal BMI <30.  Exercise 5 times per week for 30 minutes per day.      Orders:  -     POCT Glucose, Hand-Held Device  -     Ambulatory referral/consult to Nutrition Services; Future; Expected date: 10/22/2024  -     metFORMIN (GLUCOPHAGE-XR) 500 MG ER 24hr tablet; Take 1 tablet (500 mg total) by mouth daily with breakfast.  Dispense: 90 tablet; Refill: 3  -     Ambulatory referral/consult to Diabetes Education; Future; Expected date: 10/22/2024  -     Hemoglobin A1C; Future; Expected date: 01/06/2025  -     Comprehensive Metabolic Panel; Future; Expected date: 01/06/2025  -     Lipid Panel; Future; Expected date: 01/06/2025  -     Microalbumin/Creatinine Ratio, Urine; Future; Expected date: 01/06/2025  -     blood-glucose meter kit; To check BG daily, to use with insurance preferred meter  Dispense: 1 each; Refill: 0  -     lancets Misc; To check BG daily, to use with insurance preferred meter  Dispense: 120 each; Refill: 11  -     blood sugar diagnostic Strp; To check BG twice daily, to use with insurance preferred meter  Dispense: 120 each; Refill: 11  -     Ambulatory referral/consult to Home  Health; Future; Expected date: 10/15/2024    5. Class 3 obesity with alveolar hypoventilation, serious comorbidity, and body mass index (BMI) of 50.0 to 59.9 in adult  Assessment & Plan:  Dietician and Diabetes Education ordered today.   Advised the patient to avoid eating little martina snacks.   Home health ordered.     Body mass index is 51.58 kg/m².  Goal BMI <30.  Exercise 5 times a week for 30 minutes per day.  Avoid soda, simple sugars, excessive rice, potatoes or bread. Limit fast foods and fried foods.  Choose complex carbs in moderation (example: green vegetables, beans, oatmeal). Eat plenty of fresh fruits and vegetables with lean meats daily.  Do not skip meals. Eat a balanced portion size.  Avoid fad diets. Consider permanent healthy life style changes.     Orders:  -     Pneumococcal Conjugate Vaccine (20 Valent) (IM)(Preferred)      Follow up in about 3 months (around 1/15/2025) for DM II Follow Up. In addition to their scheduled follow up, the patient has also been instructed to follow up on as needed basis.     Star Odonnell NP

## 2024-10-15 NOTE — ASSESSMENT & PLAN NOTE
Continue Albuterol HFA QID PRN for wheezing +  Spirivia + Symbicort.   Avoid/limit triggers such as pollen, dust, mold and animal dander.  Avoid smoke, strong chemicals, and strong cleaning products in addition to strong perfumes/colognes.  Use rescue inhaler when needed, use nebulizer for wheezing or URI.  Report upper respiratory infections early and seek treatment.  Asthma Action Plan reviewed.

## 2024-10-21 ENCOUNTER — PATIENT MESSAGE (OUTPATIENT)
Dept: DIABETES | Facility: CLINIC | Age: 44
End: 2024-10-21

## 2024-10-21 ENCOUNTER — CLINICAL SUPPORT (OUTPATIENT)
Dept: DIABETES | Facility: CLINIC | Age: 44
End: 2024-10-21
Payer: MEDICAID

## 2024-10-21 VITALS — WEIGHT: 255.63 LBS | BODY MASS INDEX: 51.53 KG/M2 | HEIGHT: 59 IN

## 2024-10-21 DIAGNOSIS — E11.42 TYPE 2 DIABETES MELLITUS WITH DIABETIC POLYNEUROPATHY, WITHOUT LONG-TERM CURRENT USE OF INSULIN: ICD-10-CM

## 2024-10-21 PROCEDURE — G0108 DIAB MANAGE TRN  PER INDIV: HCPCS | Mod: 95,,, | Performed by: INTERNAL MEDICINE

## 2024-10-21 NOTE — PROGRESS NOTES
"Diabetes Care Specialist Virtual Visit Note       The patient location is: [place], [place]  The chief complaint leading to consultation is: Diabetes  Visit type: audiovisual  Total time spent with patient: 60 min   Each patient to whom he or she provides medical services by telemedicine is:  (1) informed of the relationship between the physician and patient and the respective role of any other health care provider with respect to management of the patient; and (2) notified that he or she may decline to receive medical services by telemedicine and may withdraw from such care at any time.    Diabetes Care Specialist Progress Note  Author: Kenyatta Jolley RN  Date: 10/21/2024    Intake    Program Intake  Reason for Diabetes Program Visit:: Initial Diabetes Assessment  Current diabetes risk level:: low  In the last 12 months, have you:: used emergency room services (4/2023  abcess)  Was the ER or hospital admission related to diabetes?: No  Permission to speak with others about care:: yes    Current Diabetes Treatment: Oral Medications  Oral Medication Type/Dose: metformin Xr 500mg Daily    Continuous Glucose Monitoring  Patient has CGM: No    Lab Results   Component Value Date    HGBA1C 6.1 04/15/2024       Weight: 115.9 kg (255 lb 9.6 oz) (last weight per chart)   Height: 4' 11" (149.9 cm)   Body mass index is 51.62 kg/m².    Lifestyle Coping Support & Clinical    Lifestyle/Coping/Support  Compared to other people your age, how would you rate your health?: Poor  Does anyone in your family have diabetes or does anyone in your family support you in your diabetes care?:  and family good support  List anything about Diabetes that causes you stress?: not knowing how to take care of dm  How do you deal with stress/distress?: support from loved ones  Learning Barriers:: None  Culture or Religion beliefs that may impact ability to access healthcare: No  Psychosocial/Coping Skills Assessment Completed: : " Yes  Assessment indicates:: Adequate understanding  Area of need?: No    Problem Review  Active Comorbidities: Obesity    Diabetes Self-Management Skills Assessment    Medication Skills Assessment  Patient is able to identify current diabetes medications, dosages, and appropriate timing of medications.: yes  Patient reports problems or concerns with current medication regimen.: no  Patient is  aware that some diabetes medications can cause low blood sugar?: No  Medication Skills Assessment Completed:: Yes  Assessment indicates:: Knowledge deficit, Instruction Needed  Area of need?: Yes    Diabetes Disease Process/Treatment Options  Diabetes Type?: Type II  When were you diagnosed?: 10/15/2024  If previous diabetes education, when/where:: no  What are your goals for this education session?: learn what to eat  Is patient aware of what causes diabetes?: Yes  Does patient understand the pathophysiology of diabetes?: No  Diabetes Disease Process/Treatment Options: Skills Assessment Completed: Yes  Assessment indicates:: Knowledge deficit, Instruction Needed  Area of need?: Yes    Nutrition/Healthy Eating  Meal Plan 24 Hour Recall - Breakfast: skips or has ceral or toast  Meal Plan 24 Hour Recall - Lunch: pickles or pizza rolls  Meal Plan 24 Hour Recall - Dinner: rice and gravy meat and green beans or corn  Meal Plan 24 Hour Recall - Snack: little martina or chips  Meal Plan 24 Hour Recall - Beverage: sweet tea, water, coffee once a month, gatorade occasionally, just swapped soda to diet  Who shops/cooks?: she does  Patient can identify foods that impact blood sugar.: no (see comments)  Challenges to healthy eating:: portion control, snacking between meals and at night  Nutrition/Healthy Eating Skills Assessment Completed:: Yes  Assessment indicates:: Knowledge deficit, Instruction Needed  Area of need?: Yes    Physical Activity/Exercise  Physical Activity/Exercise Skills Assessment Completed: : No  Deffered due to::  Time  Area of need?: Deferred    Home Blood Glucose Monitoring  Patient states that blood sugar is checked at home daily.: yes  Monitoring Method:: home glucometer  Home glucometer meter type:: true metrix  Fasting BG range history:: 130-160 this am 154  Postmeal BG range history:: 111 last night  What are your blood glucose targets?: no  How often do you check your blood sugar?: bid  What is your A1c Target?: <6.5 current 6.1  Home Blood Glucose Monitoring Skills Assessment Completed: : Yes  Assessment indicates:: Knowledge deficit, Instruction Needed  Area of need?: Yes    Acute Complications  Acute Complications Skills Assessment Completed: : No  Deffered due to:: Time  Area of need?: Deferred    Chronic Complications  Chronic Complications Skills Assessment Completed: : No  Deferred due to:: Time  Area of need?: Deferred      Assessment Summary and Plan    Based on today's diabetes care assessment, the following areas of need were identified:          10/21/2024   Areas of Need   Medications/Current Diabetes Treatment Yes Discussed MOA, onset, side effects, dosage of metformin XR    Lifestyle Coping Support No   Diabetes Disease Process/Treatment Options Yes Reviewed diabetes pathophysiology, different types of diabetes, signs and symptoms, risk factors and treatment guidelines.    Nutrition/Healthy Eating Yes see care plan    Physical Activity/Exercise Deferred   Home Blood Glucose Monitoring Yes see care plan    Acute Complications Deferred   Chronic Complications Deferred            Today's interventions were provided through individual discussion, instruction, and written materials were provided.      Patient verbalized understanding of instruction and written materials.  Pt was able to return back demonstration of instructions today. Patient understood key points, needs reinforcement and further instruction.     Diabetes Self-Management Care Plan:    Today's Diabetes Self-Management Care Plan was developed  with Venus's input. Venus has agreed to work toward the following goal(s) to improve his/her overall diabetes control.      Care Plan: Diabetes Management   Updates made since 9/21/2024 12:00 AM        Problem: Healthy Eating         Long-Range Goal: Patient agrees to use plate method for meals    Start Date: 10/21/2024   Expected End Date: 11/21/2024   Priority: High   Barriers: Knowledge deficit   Note:    10/21/2024 Reviewed food exchange list, snack ideas and meal planning tips and sent handouts in patient portal. She was drinking regular soda, sweet tea and has changed to diet recently. Likes veggies         Task: Provided visual examples using dry measuring cups, food models, and other familiar objects such as computer mouse, deck or cards, tennis ball etc. to help with visualization of portions. Completed 10/21/2024     Task: Recommended replacing beverages containing high sugar content with noncaloric/sugar free options and/or water. Completed 10/21/2024        Task: Review the importance of balancing carbohydrates with each meal using portion control techniques to count servings of carbohydrate and label reading to identify serving size and amount of total carbs per serving. Completed 10/21/2024        Task: Provided Sample plate method and reviewed the use of the plate to estimate amounts of carbohydrate per meal. Completed 10/21/2024        Problem: Blood Glucose Self-Monitoring         Long-Range Goal: Patient agrees to check and record blood sugars 1-2 times per day.    Start Date: 10/21/2024   Expected End Date: 11/21/2024   Priority: Medium   Barriers: Knowledge deficit   Note:    10/21/2024 States just started testing and FBS ranges 130-160 today was 154.         Task: Reviewed the importance of self-monitoring blood glucose and keeping logs. Completed 10/21/2024        Task: Provided patient with blood glucose logs, reviewed appropriate timing and frequency to SMBG, education on parameters on  when to notify provider and advised patient to bring logs to all appts with PCP/Endocrinologist/Diabetes Care Specialist. Completed 10/21/2024        Task: Discussed ways to minimize pain when monitoring blood glucose. Completed 10/21/2024          Follow Up Plan     Follow up in about 4 weeks (around 11/18/2024) for glucose log, continue nutrition, exercise, acute complications. . Virtual Appt made with pt. Today for 10/21/2024 at 11:00.      Today's care plan and follow up schedule was discussed with patient.  Venus verbalized understanding of the care plan, goals, and agrees to follow up plan.        The patient was encouraged to communicate with his/her health care provider/physician and care team regarding his/her condition(s) and treatment.  I provided the patient with my contact information today and encouraged to contact me via phone or [facility]'s Patient Portal as needed.     Length of Visit   Total Time: 60 Minutes

## 2024-11-04 ENCOUNTER — HOSPITAL ENCOUNTER (OUTPATIENT)
Dept: RADIOLOGY | Facility: HOSPITAL | Age: 44
Discharge: HOME | End: 2024-11-04
Attending: FAMILY MEDICINE
Payer: MEDICAID

## 2024-11-04 DIAGNOSIS — Z12.31 ENCOUNTER FOR SCREENING MAMMOGRAM FOR BREAST CANCER: ICD-10-CM

## 2024-11-04 PROCEDURE — 77067 SCR MAMMO BI INCL CAD: CPT | Mod: TC

## 2024-11-04 PROCEDURE — 77063 BREAST TOMOSYNTHESIS BI: CPT | Mod: 26,,, | Performed by: RADIOLOGY

## 2024-11-04 PROCEDURE — 77067 SCR MAMMO BI INCL CAD: CPT | Mod: 26,,, | Performed by: RADIOLOGY

## 2024-11-05 ENCOUNTER — EXTERNAL HOME HEALTH (OUTPATIENT)
Dept: HOME HEALTH SERVICES | Facility: HOSPITAL | Age: 44
End: 2024-11-05
Payer: MEDICAID

## 2024-11-07 DIAGNOSIS — E61.1 DIETARY IRON DEFICIENCY: ICD-10-CM

## 2024-11-07 RX ORDER — FERROUS GLUCONATE 324(38)MG
TABLET ORAL
Qty: 60 TABLET | Refills: 5 | Status: SHIPPED | OUTPATIENT
Start: 2024-11-07

## 2024-11-21 ENCOUNTER — CLINICAL SUPPORT (OUTPATIENT)
Dept: DIABETES | Facility: CLINIC | Age: 44
End: 2024-11-21
Payer: MEDICAID

## 2024-11-21 VITALS — HEIGHT: 59 IN | WEIGHT: 255 LBS | BODY MASS INDEX: 51.41 KG/M2

## 2024-11-21 DIAGNOSIS — E11.42 TYPE 2 DIABETES MELLITUS WITH DIABETIC POLYNEUROPATHY, WITHOUT LONG-TERM CURRENT USE OF INSULIN: Primary | ICD-10-CM

## 2024-11-21 NOTE — PROGRESS NOTES
"Diabetes Care Specialist Virtual Visit Note       The patient location is: home in [place], [place]  The chief complaint leading to consultation is: Diabetes  Visit type: audiovisual  Total time spent with patient: 60 min   Each patient to whom he or she provides medical services by telemedicine is:  (1) informed of the relationship between the physician and patient and the respective role of any other health care provider with respect to management of the patient; and (2) notified that he or she may decline to receive medical services by telemedicine and may withdraw from such care at any time.  Diabetes Care Specialist Follow-up Note  Author: Kenyatta Jolley RN  Date: 11/21/2024    Intake    Program Intake  Reason for Diabetes Program Visit:: Intervention  Type of Intervention:: Individual  Individual: Education  Education: Nutrition and Meal Planning  Current diabetes risk level:: low  In the last month, have you used the ER or been admitted to the hospital: Yes (arm pain)  Was the ER or hospital admission related to diabetes?: No  Permission to speak with others about care:: yes    Current Diabetes Treatment: Oral Medications  Oral Medication Type/Dose: metformin Xr 500mg Daily    Continuous Glucose Monitoring  Patient has CGM: No    Lab Results   Component Value Date    HGBA1C 6.1 04/15/2024     Weight: 115.7 kg (255 lb) (per chart)   Height: 4' 11" (149.9 cm)   Body mass index is 51.5 kg/m².    Diabetes Self-Management Skills Assessment     Physical Activity/Exercise  Patient's daily activity level:: sedentary  Patient formally exercises outside of work.: no  Reasons for not exercising:: other (see comments) (no reason)  Patient can identify forms of physical activity.: no  Physical Activity/Exercise Skills Assessment Completed: : Yes  Assessment indicates:: Knowledge deficit, Instruction Needed  Area of need?: Yes     Acute Complications  Have you ever had hypoglycemia (low BG 70 or less)?: no  Do you know " the symptoms of low blood sugar and how to treat?: candy or oj  lowest ever was 80  Have you ever had hyperglycemia (high  or more)?: no   Do you know the symptoms of high blood sugar and how to treat: states 300 highest on steroids  not sure what to do about it  Have you ever had DKA?: no  Do you ever test for ketones?: no  Do you have a sick day plan?: no  Acute Complications Skills Assessment Completed: : Yes  Assessment indicates:: Knowledge deficit, Instruction Needed  Area of need?: Yes    Chronic Complications  Chronic Complications Skills Assessment Completed: : No  Deferred due to:: Time  Area of need?: Deferred      During today's follow-up visit,  the following areas required further assessment and content was provided/reviewed.    Based on today's diabetes care assessment, the following areas of need were identified:      Identified Areas of Need      Medication/Current Diabetes Treatment:  Discussed last visit    Lifestyle Coping/Support:  no   Diabetes Disease Process/Treatment Options:  Discussed last visit    Nutrition/Healthy Eating:   yes see care plan    Physical Activity/Exercise: Yes see care plan    Home Blood Glucose Monitoring:   yes see care plan   Acute Complications: Yes Discussed prevention, identification signs/symptoms and treatment of hyperglycemia and hypoglycemia and when to contact clinic. Reviewed sick day, travel guidelines.    Chronic Complications: Deferred       Today's interventions were provided through individual discussion, instruction, and written materials were provided.    Patient verbalized understanding of instruction and written materials.  Pt was able to return back demonstration of instructions today. Patient understood key points, needs reinforcement and further instruction.     Diabetes Self-Management Care Plan Review and Evaluation of Progress:    During today's follow-up [facility] Diabetes Self-Management Care Plan progress was reviewed and progress was  evaluated including his/her input. Venus has agreed to continue his/her journey to improve/maintain overall diabetes control by continuing to set health goals. See care plan progress below.      Care Plan: Diabetes Management   Updates made since 11/22/2023 12:00 AM        Problem: Healthy Eating         Long-Range Goal: Patient agrees to use plate method for meals    Start Date: 10/21/2024   Expected End Date: 2/17/2025   Priority: High   Barriers: Knowledge deficit   Note:    10/21/2024 Reviewed food exchange list, snack ideas and meal planning tips and sent handouts in patient portal. She was drinking regular soda, sweet tea and has changed to diet recently. Likes veggies  11/21/24 Discussed a few more meals she ate yesterday and she may try cauliflower mash potatoes mixed with potatoes. Tonight making chili she will add broccoli to bowl.  Discussed ways to add more non-starchy vegetables to meals         Task: Reviewed the sources and role of Carbohydrate, Protein, and Fat and how each nutrient impacts blood sugar. Completed 11/21/2024        Task: Provided visual examples using dry measuring cups, food models, and other familiar objects such as computer mouse, deck or cards, tennis ball etc. to help with visualization of portions. Completed 10/21/2024     Task: Discussed strategies for choosing healthier menu options when dining out. Completed 11/21/2024        Task: Recommended replacing beverages containing high sugar content with noncaloric/sugar free options and/or water. Completed 10/21/2024        Task: Review the importance of balancing carbohydrates with each meal using portion control techniques to count servings of carbohydrate and label reading to identify serving size and amount of total carbs per serving. Completed 10/21/2024        Task: Provided Sample plate method and reviewed the use of the plate to estimate amounts of carbohydrate per meal. Completed 10/21/2024        Problem: Blood Glucose  Self-Monitoring         Long-Range Goal: Patient agrees to check and record blood sugars 1-2 times per day.    Start Date: 10/21/2024   Expected End Date: 11/21/2024   This Visit's Progress: Met   Priority: Medium   Barriers: Knowledge deficit   Note:    10/21/2024 States just started testing and FBS ranges 130-160 today was 154.    11/21/2024 States BS .  When asked what she thinks the difference is, states she stopped eating sweets. Sherry met.        Task: Reviewed the importance of self-monitoring blood glucose and keeping logs. Completed 10/21/2024        Task: Provided patient with blood glucose logs, reviewed appropriate timing and frequency to SMBG, education on parameters on when to notify provider and advised patient to bring logs to all appts with PCP/Endocrinologist/Diabetes Care Specialist. Completed 10/21/2024        Task: Discussed ways to minimize pain when monitoring blood glucose. Completed 10/21/2024        Problem: Physical Activity and Exercise         Goal: Patient agrees to increase physical activity to a goal of 3  times per week for 15 minutes.    Start Date: 11/21/2024   Expected End Date: 2/17/2025   Priority: Low   Barriers: Knowledge deficit; Physical Limitations   Note:    11/21/2024 states has TM and feels she is able to do more walking, discussed taking breaks if needed to split up activity.  Discussed sitting chair and arm exercises and demonstrated some on video visit.        Task: Discussed role of physical activity on reducing insulin resistance and improvement in overall glycemic control. Completed 11/21/2024        Task: Discussed role of physical activity as it relates to weight loss Completed 11/21/2024        Task: Offered suggestions on how patient could increase their regular physical activity Completed 11/21/2024        Task: Reviewed blood glucose monitoring before, during and after exercise/activity Completed 11/21/2024          Follow Up Plan     Follow up in about  3 months (around 2/21/2025) for Virtual Visit. Appt made with pt. Today for 2/17/2025 at 1100.  Plan on discussing chronic complications, food labels, review skills and goals.      Today's care plan and follow up schedule was discussed with patient.  Venus verbalized understanding of the care plan, goals, and agrees to follow up plan.        The patient was encouraged to communicate with his/her health care provider/physician and care team regarding his/her condition(s) and treatment.  I provided the patient with my contact information today and encouraged to contact me via phone or [facility] Patient Portal as needed.     Length of Visit   Total Time: 60 Minutes

## 2024-12-02 ENCOUNTER — OFFICE VISIT (OUTPATIENT)
Dept: FAMILY MEDICINE | Facility: CLINIC | Age: 44
End: 2024-12-02
Payer: MEDICAID

## 2024-12-02 ENCOUNTER — TELEPHONE (OUTPATIENT)
Dept: FAMILY MEDICINE | Facility: CLINIC | Age: 44
End: 2024-12-02

## 2024-12-02 VITALS
DIASTOLIC BLOOD PRESSURE: 61 MMHG | SYSTOLIC BLOOD PRESSURE: 90 MMHG | HEART RATE: 102 BPM | WEIGHT: 247.19 LBS | RESPIRATION RATE: 20 BRPM | TEMPERATURE: 98 F | HEIGHT: 59 IN | BODY MASS INDEX: 49.83 KG/M2 | OXYGEN SATURATION: 99 %

## 2024-12-02 DIAGNOSIS — E66.813 CLASS 3 OBESITY WITH ALVEOLAR HYPOVENTILATION, SERIOUS COMORBIDITY, AND BODY MASS INDEX (BMI) OF 45.0 TO 49.9 IN ADULT: ICD-10-CM

## 2024-12-02 DIAGNOSIS — E66.2 CLASS 3 OBESITY WITH ALVEOLAR HYPOVENTILATION, SERIOUS COMORBIDITY, AND BODY MASS INDEX (BMI) OF 45.0 TO 49.9 IN ADULT: ICD-10-CM

## 2024-12-02 DIAGNOSIS — R05.2 SUBACUTE COUGH: Primary | ICD-10-CM

## 2024-12-02 DIAGNOSIS — R06.09 DYSPNEA ON EXERTION: ICD-10-CM

## 2024-12-02 PROBLEM — J06.9 ACUTE URI: Status: RESOLVED | Noted: 2024-10-12 | Resolved: 2024-12-02

## 2024-12-02 PROCEDURE — 4010F ACE/ARB THERAPY RXD/TAKEN: CPT | Mod: CPTII,,,

## 2024-12-02 PROCEDURE — 3078F DIAST BP <80 MM HG: CPT | Mod: CPTII,,,

## 2024-12-02 PROCEDURE — 3044F HG A1C LEVEL LT 7.0%: CPT | Mod: CPTII,,,

## 2024-12-02 PROCEDURE — 3074F SYST BP LT 130 MM HG: CPT | Mod: CPTII,,,

## 2024-12-02 PROCEDURE — 1160F RVW MEDS BY RX/DR IN RCRD: CPT | Mod: CPTII,,,

## 2024-12-02 PROCEDURE — 99214 OFFICE O/P EST MOD 30 MIN: CPT | Mod: ,,,

## 2024-12-02 PROCEDURE — 3008F BODY MASS INDEX DOCD: CPT | Mod: CPTII,,,

## 2024-12-02 PROCEDURE — 1159F MED LIST DOCD IN RCRD: CPT | Mod: CPTII,,,

## 2024-12-02 RX ORDER — BENZONATATE 200 MG/1
200 CAPSULE ORAL 3 TIMES DAILY PRN
Qty: 30 CAPSULE | Refills: 0 | Status: SHIPPED | OUTPATIENT
Start: 2024-12-02 | End: 2024-12-12

## 2024-12-02 NOTE — PROGRESS NOTES
Patient ID: [id number]     Chief Complaint: Cough (Chest congestion)    HPI:     Mellissa Ann Milligan is a 44-year-old female who presents today for a follow-up regarding her persistent cough and chest congestion, which have been ongoing since October. She has previously received steroid treatment but is uncertain about its effectiveness. Venus continues to use her inhalers as directed and experiences shortness of breath only during walking. She denies experiencing any chest pain or dizziness and has no additional complaints at this time.    Past Medical History:   Diagnosis Date    Asthma     Chronic sinusitis, unspecified     Dietary iron deficiency     Essential (primary) hypertension     Eustachian tube dysfunction, bilateral     DOMINIQUE (iron deficiency anemia)     Leukocytosis     Morbid obesity     Nasal congestion     Nasal polyps     Obesity hypoventilation syndrome     Obstructive sleep apnea     JOSEPH on CPAP     Prediabetes         Past Surgical History:   Procedure Laterality Date     SECTION      CHOLECYSTECTOMY      COLONOSCOPY  07/15/2021    Dr Ricky Hale    ESOPHAGOGASTRODUODENOSCOPY  2021    Dr Ricky Hale    FESS, WITH DACRYOCYSTORHINOSTOMY      NASAL TURBINATE REDUCTION      SINUS SURGERY          Social History     Socioeconomic History    Marital status:    Tobacco Use    Smoking status: Never    Smokeless tobacco: Never   Substance and Sexual Activity    Alcohol use: Never    Drug use: Never    Sexual activity: Not Currently     Social Drivers of Health     Financial Resource Strain: Medium Risk (10/13/2023)    Overall Financial Resource Strain (CARDIA)     Difficulty of Paying Living Expenses: Somewhat hard   Food Insecurity: No Food Insecurity (10/13/2023)    Hunger Vital Sign     Worried About Running Out of Food in the Last Year: Never true     Ran Out of Food in the Last Year: Never true   Transportation Needs: No Transportation Needs (10/13/2023)    PRAPARE -  Transportation     Lack of Transportation (Medical): No     Lack of Transportation (Non-Medical): No   Physical Activity: Inactive (10/13/2023)    Exercise Vital Sign     Days of Exercise per Week: 0 days     Minutes of Exercise per Session: 0 min   Stress: No Stress Concern Present (10/13/2023)    [employer] - Occupational Stress Questionnaire     Feeling of Stress : Not at all   Housing Stability: Low Risk  (10/13/2023)    Housing Stability Vital Sign     Unable to Pay for Housing in the Last Year: No     Number of Places Lived in the Last Year: 1     Unstable Housing in the Last Year: No        Current Outpatient Medications   Medication Instructions    albuterol (PROVENTIL/VENTOLIN HFA) 90 mcg/actuation inhaler INHALE 2 PUFFS BY MOUTH EVERY 4 HOURS AS NEEDED FOR WHEEZE OR FOR SHORTNESS OF BREATH    albuterol-ipratropium (DUO-NEB) 2.5 mg-0.5 mg/3 mL nebulizer solution NEBULIZE 1 VIAL 4 TIMES DAILY    benzonatate (TESSALON) 200 mg, Oral, 3 times daily PRN    blood sugar diagnostic Strp To check BG twice daily, to use with insurance preferred meter    blood-glucose meter kit To check BG daily, to use with insurance preferred meter    budesonide-formoterol 160-4.5 mcg (SYMBICORT) 160-4.5 mcg/actuation HFAA 2 puffs, 2 times daily    ferrous gluconate (FERGON) 324 MG tablet TAKE 1 TABLET BY MOUTH TWICE A DAY    fluticasone propionate (FLONASE) 50 mcg/actuation nasal spray SPRAY 2 SPRAYS INTO EACH NOSTRIL ONCE DAILY.    gabapentin (NEURONTIN) 300 mg, Oral, 3 times daily    lancets Misc To check BG daily, to use with insurance preferred meter    losartan (COZAAR) 50 mg, Oral    metFORMIN (GLUCOPHAGE-XR) 500 mg, Oral, With breakfast    pantoprazole (PROTONIX) 40 mg, Oral, Daily    tiotropium (SPIRIVA WITH HANDIHALER) 18 mcg inhalation capsule INHALE 2 INHALATIONS OF ONE CAPSULE ONCE DAILY       Review of patient's allergies indicates:  No Known Allergies     Patient Care Team:  Lola  "DO Sebastian as PCP - General (Family Medicine)  Thee Raymond MD as Consulting Physician (Otolaryngology)  Ricky Hale MD as Consulting Physician (Gastroenterology)  Marco Armstrong MD as Consulting Physician (Pulmonary Disease)  Kenyatta Jolley, RN as Diabetes Educator (Diabetes)     Subjective:     Review of Systems    12 point review of systems conducted, negative except as stated in the history of present illness. See HPI for details.    Objective:     Visit Vitals  BP 90/61 (BP Location: Right arm, Patient Position: Sitting)   Pulse 102   Temp 97.8 °F (36.6 °C)   Resp 20   Ht 4' 11" (1.499 m)   Wt 112.1 kg (247 lb 3.2 oz)   LMP 11/09/2024   SpO2 99%   BMI 49.93 kg/m²       Physical Exam  Vitals and nursing note reviewed.   Constitutional:       General: She is not in acute distress.     Appearance: She is obese. She is not ill-appearing.   HENT:      Head: Normocephalic and atraumatic.      Mouth/Throat:      Mouth: Mucous membranes are moist.      Pharynx: Oropharynx is clear.   Eyes:      General: No scleral icterus.     Extraocular Movements: Extraocular movements intact.      Conjunctiva/sclera: Conjunctivae normal.      Pupils: Pupils are equal, round, and reactive to light.   Neck:      Vascular: No carotid bruit.   Cardiovascular:      Rate and Rhythm: Normal rate and regular rhythm.      Heart sounds: No murmur heard.     No friction rub. No gallop.   Pulmonary:      Effort: Pulmonary effort is normal. No respiratory distress.      Breath sounds: Normal breath sounds. Decreased air movement present. No wheezing, rhonchi or rales.   Abdominal:      General: Abdomen is flat. Bowel sounds are normal. There is no distension.      Palpations: Abdomen is soft. There is no mass.      Tenderness: There is no abdominal tenderness.   Musculoskeletal:         General: Normal range of motion.      Cervical back: Normal range of motion and neck supple.   Skin:     General: Skin is warm and dry. "   Neurological:      General: No focal deficit present.      Mental Status: She is alert.   Psychiatric:         Mood and Affect: Mood normal.       Labs Reviewed:     Chemistry:  Lab Results   Component Value Date     10/10/2024    K 3.8 10/10/2024    BUN 7.0 10/10/2024    CREATININE 0.75 10/10/2024    EGFRNORACEVR >60 10/10/2024    GLUCOSE 118 (H) 10/10/2024    CALCIUM 9.5 10/10/2024    ALKPHOS 85 10/10/2024    LABPROT 6.9 10/10/2024    ALBUMIN 3.3 (L) 10/10/2024    BILIDIR 0.1 09/30/2021    IBILI 0.10 09/30/2021    AST 22 10/10/2024    ALT 34 10/10/2024    AWJANMZL36VV 30.1 10/03/2023    TSH 1.411 10/10/2024    XPGZHH7CTRE 1.25 08/24/2017        Lab Results   Component Value Date    HGBA1C 6.1 04/15/2024        Hematology:  Lab Results   Component Value Date    WBC 7.00 10/10/2024    HGB 13.1 10/10/2024    HCT 39.0 10/10/2024     10/10/2024       Lipid Panel:  Lab Results   Component Value Date    CHOL 144 10/10/2024    HDL 34 (L) 10/10/2024    LDL 91.00 10/10/2024    TRIG 96 10/10/2024    TOTALCHOLEST 4 10/10/2024        Urine:  Lab Results   Component Value Date    APPEARANCEUA CLEAR 07/02/2019    PROTEINUA Negative 07/02/2019    LEUKOCYTESUR Negative 07/02/2019    RBCUA 0-3 07/02/2019    WBCUA None Seen 07/02/2019    BACTERIA Rare (A) 07/02/2019     Assessment:       ICD-10-CM ICD-9-CM   1. Subacute cough  R05.2 786.2   2. Dyspnea on exertion  R06.09 786.09   3. Class 3 obesity with alveolar hypoventilation, serious comorbidity, and body mass index (BMI) of 45.0 to 49.9 in adult  E66.813 278.03    E66.2 V85.42    Z68.42       Plan:     1. Subacute cough  -     CT Chest Without Contrast; Future; Expected date: 12/02/2024  -     benzonatate (TESSALON) 200 MG capsule; Take 1 capsule (200 mg total) by mouth 3 (three) times daily as needed for Cough.  Dispense: 30 capsule; Refill: 0    2. Dyspnea on exertion  -     CT Chest Without Contrast; Future; Expected date: 12/02/2024    3. Class 3 obesity with  alveolar hypoventilation, serious comorbidity, and body mass index (BMI) of 45.0 to 49.9 in adult    Maintain the use of inhalers as directed by your healthcare provider.    Initiate Tessalon 200 mg three times daily as needed for cough relief.    A chest CT scan is recommended for additional assessment; dyspnea during physical activity is likely related to obesity.   Emphasize dietary adjustments and lifestyle modifications to reduce asthma flare-ups.      No follow-ups on file. In addition to their scheduled follow up, the patient has also been instructed to follow up on as needed basis.     Future Appointments   Date Time Provider Department Center   12/3/2024  1:30 PM [facility] CT1 550 LB LIMIT [facility]   1/17/2025  9:40 AM Sebastian Davila DO [facility]    2/17/2025 11:00 AM Kenyatta Jolley RN [facility]   10/20/2025 10:00 AM Star Odonnell NP [facility] SARMAD Odonnell NP

## 2024-12-03 ENCOUNTER — HOSPITAL ENCOUNTER (OUTPATIENT)
Dept: RADIOLOGY | Facility: HOSPITAL | Age: 44
Discharge: HOME | End: 2024-12-03
Payer: MEDICAID

## 2024-12-03 ENCOUNTER — TELEPHONE (OUTPATIENT)
Dept: FAMILY MEDICINE | Facility: CLINIC | Age: 44
End: 2024-12-03
Payer: MEDICAID

## 2024-12-03 DIAGNOSIS — R06.09 DYSPNEA ON EXERTION: ICD-10-CM

## 2024-12-03 DIAGNOSIS — R05.2 SUBACUTE COUGH: ICD-10-CM

## 2024-12-03 PROCEDURE — 71250 CT THORAX DX C-: CPT | Mod: TC

## 2024-12-03 NOTE — TELEPHONE ENCOUNTER
----- Message from Star Odonnell NP sent at 12/3/2024  2:15 PM CST -----  Please inform patient of lab results.     1. CT Chest unremarkable.     Thanks for all you do,   Star

## 2024-12-16 ENCOUNTER — OFFICE VISIT (OUTPATIENT)
Dept: FAMILY MEDICINE | Facility: CLINIC | Age: 44
End: 2024-12-16
Payer: MEDICAID

## 2024-12-16 VITALS
DIASTOLIC BLOOD PRESSURE: 69 MMHG | WEIGHT: 246 LBS | SYSTOLIC BLOOD PRESSURE: 106 MMHG | OXYGEN SATURATION: 97 % | RESPIRATION RATE: 20 BRPM | BODY MASS INDEX: 49.59 KG/M2 | TEMPERATURE: 98 F | HEART RATE: 68 BPM | HEIGHT: 59 IN

## 2024-12-16 DIAGNOSIS — R09.02 HYPOXIA: ICD-10-CM

## 2024-12-16 DIAGNOSIS — H91.93 DECREASED HEARING OF BOTH EARS: ICD-10-CM

## 2024-12-16 DIAGNOSIS — R06.00 ACUTE DYSPNEA: ICD-10-CM

## 2024-12-16 DIAGNOSIS — E11.65 TYPE 2 DIABETES MELLITUS WITH HYPERGLYCEMIA, WITHOUT LONG-TERM CURRENT USE OF INSULIN: ICD-10-CM

## 2024-12-16 DIAGNOSIS — Z09 HOSPITAL DISCHARGE FOLLOW-UP: Primary | ICD-10-CM

## 2024-12-16 PROCEDURE — 1160F RVW MEDS BY RX/DR IN RCRD: CPT | Mod: CPTII,,,

## 2024-12-16 PROCEDURE — 4010F ACE/ARB THERAPY RXD/TAKEN: CPT | Mod: CPTII,,,

## 2024-12-16 PROCEDURE — 3078F DIAST BP <80 MM HG: CPT | Mod: CPTII,,,

## 2024-12-16 PROCEDURE — 3044F HG A1C LEVEL LT 7.0%: CPT | Mod: CPTII,,,

## 2024-12-16 PROCEDURE — 1159F MED LIST DOCD IN RCRD: CPT | Mod: CPTII,,,

## 2024-12-16 PROCEDURE — 3074F SYST BP LT 130 MM HG: CPT | Mod: CPTII,,,

## 2024-12-16 PROCEDURE — 99496 TRANSJ CARE MGMT HIGH F2F 7D: CPT | Mod: ,,,

## 2024-12-16 RX ORDER — DOXYCYCLINE 100 MG/1
100 CAPSULE ORAL 2 TIMES DAILY
COMMUNITY
Start: 2024-12-13

## 2024-12-16 RX ORDER — PREDNISONE 20 MG/1
60 TABLET ORAL
COMMUNITY
Start: 2024-12-13

## 2024-12-16 NOTE — PROGRESS NOTES
Transitional Care Note  Subjective:         Patient ID: Mellissa Ann Milligan is a 44 y.o. female.  Chief Complaint: Hospital Follow Up (Admitted to [facility] on 12/11/24 and discharged on 12/13/24 for sob and low oxygen)    Family and/or Caretaker present at visit?  No.  Diagnostic tests reviewed/disposition: I have reviewed all completed as well as pending diagnostic tests at the time of discharge.  Disease/illness education: Hypoxia and Acute Dyspnea   Home health/community services discussion/referrals: Patient does not have home health established from hospital visit.  They do need home health.  If needed, we will set up home health for the patient.   Establishment or re-establishment of referral orders for community resources: No other necessary community resources.   Discussion with other health care providers: No discussion with other health care providers necessary.       Mellissa Milligan, a 44-year-old patient, is here today for a follow-up after her hospital discharge. She was admitted to [facility] on December 11, 2024, due to shortness of breath and was diagnosed with acute dyspnea, hypoxia, and an exacerbation of asthma. Cardiac evaluations returned negative results. A CT angiogram showed no signs of lung infiltrates or pulmonary embolism. Upon discharge, she is now using a 2L nasal cannula and continues to utilize CPAP during the night.        Current Outpatient Medications:     albuterol (PROVENTIL/VENTOLIN HFA) 90 mcg/actuation inhaler, INHALE 2 PUFFS BY MOUTH EVERY 4 HOURS AS NEEDED FOR WHEEZE OR FOR SHORTNESS OF BREATH, Disp: 6.7 g, Rfl: 11    albuterol-ipratropium (DUO-NEB) 2.5 mg-0.5 mg/3 mL nebulizer solution, NEBULIZE 1 VIAL 4 TIMES DAILY, Disp: 180 mL, Rfl: 5    blood sugar diagnostic Strp, To check BG twice daily, to use with insurance preferred meter, Disp: 120 each, Rfl: 11    blood-glucose meter kit, To check BG daily, to use with insurance preferred meter, Disp:  1 each, Rfl: 0    budesonide-formoterol 160-4.5 mcg (SYMBICORT) 160-4.5 mcg/actuation HFAA, TAKE 2 PUFFS BY MOUTH TWICE A DAY, Disp: 10.2 g, Rfl: 5    doxycycline (VIBRAMYCIN) 100 MG Cap, Take 100 mg by mouth 2 (two) times daily., Disp: , Rfl:     ferrous gluconate (FERGON) 324 MG tablet, TAKE 1 TABLET BY MOUTH TWICE A DAY, Disp: 60 tablet, Rfl: 5    fluticasone propionate (FLONASE) 50 mcg/actuation nasal spray, SPRAY 2 SPRAYS INTO EACH NOSTRIL ONCE DAILY., Disp: 48 mL, Rfl: 2    gabapentin (NEURONTIN) 300 MG capsule, Take 1 capsule (300 mg total) by mouth 3 (three) times daily., Disp: 90 capsule, Rfl: 5    lancets Misc, To check BG daily, to use with insurance preferred meter, Disp: 120 each, Rfl: 11    losartan (COZAAR) 50 MG tablet, TAKE 1 TABLET BY MOUTH EVERY DAY, Disp: 90 tablet, Rfl: 1    metFORMIN (GLUCOPHAGE-XR) 500 MG ER 24hr tablet, Take 1 tablet (500 mg total) by mouth daily with breakfast., Disp: 90 tablet, Rfl: 3    pantoprazole (PROTONIX) 40 MG tablet, Take 1 tablet (40 mg total) by mouth once daily., Disp: 30 tablet, Rfl: 11    predniSONE (DELTASONE) 20 MG tablet, Take 60 mg by mouth., Disp: , Rfl:     tiotropium (SPIRIVA WITH HANDIHALER) 18 mcg inhalation capsule, INHALE 2 INHALATIONS OF ONE CAPSULE ONCE DAILY, Disp: 30 capsule, Rfl: 6    semaglutide (OZEMPIC) 0.25 mg or 0.5 mg (2 mg/3 mL) pen injector, Inject 0.25 mg into the skin every 7 days., Disp: 1.5 mL, Rfl: 0  Objective:      Physical Exam  Vitals and nursing note reviewed.   Constitutional:       General: She is not in acute distress.     Appearance: She is obese. She is not ill-appearing.   HENT:      Head: Normocephalic and atraumatic.      Mouth/Throat:      Mouth: Mucous membranes are moist.      Pharynx: Oropharynx is clear.   Eyes:      General: No scleral icterus.     Extraocular Movements: Extraocular movements intact.      Conjunctiva/sclera: Conjunctivae normal.      Pupils: Pupils are equal, round, and reactive to light.   Neck:       Vascular: No carotid bruit.      Comments: Enlarged neck circumference   Cardiovascular:      Rate and Rhythm: Normal rate and regular rhythm.      Heart sounds: No murmur heard.     No friction rub. No gallop.   Pulmonary:      Effort: Pulmonary effort is normal. No respiratory distress.      Breath sounds: Normal breath sounds. No wheezing, rhonchi or rales.   Abdominal:      General: Abdomen is flat. Bowel sounds are normal. There is no distension.      Palpations: Abdomen is soft. There is no mass.      Tenderness: There is no abdominal tenderness.   Musculoskeletal:         General: Normal range of motion.      Cervical back: Normal range of motion and neck supple.   Skin:     General: Skin is warm and dry.   Neurological:      General: No focal deficit present.      Mental Status: She is alert.   Psychiatric:         Mood and Affect: Mood normal.           Assessment:       1. Hospital discharge follow-up    2. Hypoxia    3. Acute dyspnea    4. Decreased hearing of both ears    5. Type 2 diabetes mellitus with hyperglycemia, without long-term current use of insulin        Plan:     1. Hospital discharge follow-up  -     Ambulatory referral/consult to Home Health; Future; Expected date: 12/18/2024    2. Hypoxia  -     Ambulatory referral/consult to Home Health; Future; Expected date: 12/18/2024  -     Ambulatory referral/consult to Pulmonology; Future; Expected date: 12/18/2024    Continue on 2L Nasal Cannula    3. Acute dyspnea  -     Ambulatory referral/consult to Pulmonology; Future; Expected date: 12/18/2024    4. Decreased hearing of both ears  -     Ambulatory referral/consult to ENT; Future; Expected date: 12/18/2024    5. Type 2 diabetes mellitus with hyperglycemia, without long-term current use of insulin  -     semaglutide (OZEMPIC) 0.25 mg or 0.5 mg (2 mg/3 mL) pen injector; Inject 0.25 mg into the skin every 7 days.  Dispense: 1.5 mL; Refill: 0    Educated the patient on the common side  effects:   Nausea, abdominal pain, diarrhea, vomiting, and constipation.  Avoid sweet, greasy, or sweet foods.   Eat more slowly.   Do not mix insulin and Ozempic together in the same injection.   Change your injection site with each injection. Do not use the same site for injection.     Follow up for Keep Scheduled Appointment.. In addition to their scheduled follow up, the patient has also been instructed to follow up on as needed basis.

## 2024-12-18 RX ORDER — SEMAGLUTIDE 0.68 MG/ML
0.25 INJECTION, SOLUTION SUBCUTANEOUS
Qty: 1.5 ML | Refills: 0 | Status: SHIPPED | OUTPATIENT
Start: 2024-12-18 | End: 2025-01-17

## 2024-12-24 PROCEDURE — G0180 MD CERTIFICATION HHA PATIENT: HCPCS | Mod: ,,,

## 2025-01-17 ENCOUNTER — OFFICE VISIT (OUTPATIENT)
Dept: FAMILY MEDICINE | Facility: CLINIC | Age: 45
End: 2025-01-17
Payer: MEDICAID

## 2025-01-17 ENCOUNTER — HOSPITAL ENCOUNTER (OUTPATIENT)
Dept: RADIOLOGY | Facility: HOSPITAL | Age: 45
Discharge: HOME | End: 2025-01-17
Attending: FAMILY MEDICINE
Payer: MEDICAID

## 2025-01-17 VITALS
OXYGEN SATURATION: 96 % | HEIGHT: 59 IN | DIASTOLIC BLOOD PRESSURE: 68 MMHG | WEIGHT: 252.31 LBS | RESPIRATION RATE: 19 BRPM | HEART RATE: 64 BPM | BODY MASS INDEX: 50.87 KG/M2 | TEMPERATURE: 97 F | SYSTOLIC BLOOD PRESSURE: 110 MMHG

## 2025-01-17 DIAGNOSIS — M54.50 CHRONIC MIDLINE LOW BACK PAIN WITHOUT SCIATICA: ICD-10-CM

## 2025-01-17 DIAGNOSIS — E11.65 TYPE 2 DIABETES MELLITUS WITH HYPERGLYCEMIA, WITHOUT LONG-TERM CURRENT USE OF INSULIN: Primary | ICD-10-CM

## 2025-01-17 DIAGNOSIS — G89.29 CHRONIC MIDLINE LOW BACK PAIN WITHOUT SCIATICA: ICD-10-CM

## 2025-01-17 PROCEDURE — 3078F DIAST BP <80 MM HG: CPT | Mod: CPTII,,, | Performed by: FAMILY MEDICINE

## 2025-01-17 PROCEDURE — 72100 X-RAY EXAM L-S SPINE 2/3 VWS: CPT | Mod: TC

## 2025-01-17 PROCEDURE — 3074F SYST BP LT 130 MM HG: CPT | Mod: CPTII,,, | Performed by: FAMILY MEDICINE

## 2025-01-17 PROCEDURE — 1159F MED LIST DOCD IN RCRD: CPT | Mod: CPTII,,, | Performed by: FAMILY MEDICINE

## 2025-01-17 PROCEDURE — 3008F BODY MASS INDEX DOCD: CPT | Mod: CPTII,,, | Performed by: FAMILY MEDICINE

## 2025-01-17 PROCEDURE — 99214 OFFICE O/P EST MOD 30 MIN: CPT | Mod: ,,, | Performed by: FAMILY MEDICINE

## 2025-01-17 PROCEDURE — 3044F HG A1C LEVEL LT 7.0%: CPT | Mod: CPTII,,, | Performed by: FAMILY MEDICINE

## 2025-01-17 PROCEDURE — 1160F RVW MEDS BY RX/DR IN RCRD: CPT | Mod: CPTII,,, | Performed by: FAMILY MEDICINE

## 2025-01-17 RX ORDER — SEMAGLUTIDE 0.68 MG/ML
0.25 INJECTION, SOLUTION SUBCUTANEOUS
Qty: 3 ML | Refills: 0 | Status: SHIPPED | OUTPATIENT
Start: 2025-01-17 | End: 2025-03-18

## 2025-01-17 RX ORDER — ATORVASTATIN CALCIUM 10 MG/1
10 TABLET, FILM COATED ORAL DAILY
Qty: 30 TABLET | Refills: 11 | Status: SHIPPED | OUTPATIENT
Start: 2025-01-17 | End: 2026-01-17

## 2025-01-17 NOTE — PROGRESS NOTES
"   Patient ID: [id number]     Chief Complaint: Diabetes (3 month follow up. ) and Back Pain (" Sometimes when I'm standing then sit back down it hurts but goes away" )        HPI:     Mellissa Ann Milligan is a 44 y.o. female here today for Diabetes (3 month follow up. ) and Back Pain (" Sometimes when I'm standing then sit back down it hurts but goes away" ) No other complaints today.         -------------------------------------    Asthma    Chronic sinusitis, unspecified    Dietary iron deficiency    Essential (primary) hypertension    Eustachian tube dysfunction, bilateral    DOMINIQUE (iron deficiency anemia)    Leukocytosis    Morbid obesity    Nasal congestion    Nasal polyps    Obesity hypoventilation syndrome    Obstructive sleep apnea    JOSEPH on CPAP    Prediabetes        Past Surgical History:   Procedure Laterality Date     SECTION      CHOLECYSTECTOMY      COLONOSCOPY  07/15/2021    Dr Ricky Hale    ESOPHAGOGASTRODUODENOSCOPY  2021    Dr Ricky Hale    FESS, WITH DACRYOCYSTORHINOSTOMY      NASAL TURBINATE REDUCTION      SINUS SURGERY         Review of patient's allergies indicates:  No Known Allergies    Outpatient Medications Marked as Taking for the 25 encounter (Office Visit) with Sebastian Davila, DO   Medication Sig Dispense Refill    albuterol (PROVENTIL/VENTOLIN HFA) 90 mcg/actuation inhaler INHALE 2 PUFFS BY MOUTH EVERY 4 HOURS AS NEEDED FOR WHEEZE OR FOR SHORTNESS OF BREATH 6.7 g 11    albuterol-ipratropium (DUO-NEB) 2.5 mg-0.5 mg/3 mL nebulizer solution NEBULIZE 1 VIAL 4 TIMES DAILY 180 mL 5    blood sugar diagnostic Strp To check BG twice daily, to use with insurance preferred meter 120 each 11    blood-glucose meter kit To check BG daily, to use with insurance preferred meter 1 each 0    budesonide-formoterol 160-4.5 mcg (SYMBICORT) 160-4.5 mcg/actuation HFAA TAKE 2 PUFFS BY MOUTH TWICE A DAY 10.2 g 5    ferrous gluconate (FERGON) 324 MG tablet TAKE 1 TABLET BY MOUTH TWICE A DAY 60 " tablet 5    fluticasone propionate (FLONASE) 50 mcg/actuation nasal spray SPRAY 2 SPRAYS INTO EACH NOSTRIL ONCE DAILY. 48 mL 2    gabapentin (NEURONTIN) 300 MG capsule Take 1 capsule (300 mg total) by mouth 3 (three) times daily. 90 capsule 5    lancets Misc To check BG daily, to use with insurance preferred meter 120 each 11    losartan (COZAAR) 50 MG tablet TAKE 1 TABLET BY MOUTH EVERY DAY 90 tablet 1    metFORMIN (GLUCOPHAGE-XR) 500 MG ER 24hr tablet Take 1 tablet (500 mg total) by mouth daily with breakfast. 90 tablet 3    tiotropium (SPIRIVA WITH HANDIHALER) 18 mcg inhalation capsule INHALE 2 INHALATIONS OF ONE CAPSULE ONCE DAILY 30 capsule 6       Social History     Socioeconomic History    Marital status:    Tobacco Use    Smoking status: Never    Smokeless tobacco: Never   Substance and Sexual Activity    Alcohol use: Never    Drug use: Never    Sexual activity: Yes     Partners: Male     Birth control/protection: None     Social Drivers of Health     Financial Resource Strain: Low Risk  (1/10/2025)    Overall Financial Resource Strain (CARDIA)     Difficulty of Paying Living Expenses: Not hard at all   Food Insecurity: No Food Insecurity (1/10/2025)    Hunger Vital Sign     Worried About Running Out of Food in the Last Year: Never true     Ran Out of Food in the Last Year: Never true   Transportation Needs: No Transportation Needs (10/13/2023)    PRAPARE - Transportation     Lack of Transportation (Medical): No     Lack of Transportation (Non-Medical): No   Physical Activity: Unknown (1/10/2025)    Exercise Vital Sign     Days of Exercise per Week: Patient declined     Minutes of Exercise per Session: 20 min   Stress: No Stress Concern Present (1/10/2025)    [employer] - Occupational Stress Questionnaire     Feeling of Stress : Not at all   Housing Stability: Low Risk  (10/13/2023)    Housing Stability Vital Sign     Unable to Pay for Housing in the Last Year: No      "Number of Places Lived in the Last Year: 1     Unstable Housing in the Last Year: No        Family History   Problem Relation Name Age of Onset    Diabetes Mother Chitra henriquez     COPD Mother Chitra henriquez     Diabetes Maternal Grandmother          Patient Care Team:  Sebastian Davila DO as PCP - General (Family Medicine)  Thee Raymond MD as Consulting Physician (Otolaryngology)  Ricky Hale MD as Consulting Physician (Gastroenterology)  Marco Armstrong MD as Consulting Physician (Pulmonary Disease)  Kenyatta Jolley RN as Diabetes Educator (Diabetes)     Subjective:     ROS    See HPI for details  All Other ROS: Negative except as stated in HPI.       Objective:     /68 (BP Location: Left arm, Patient Position: Sitting)   Pulse 64   Temp 97 °F (36.1 °C)   Resp 19   Ht 4' 11" (1.499 m)   Wt 114.4 kg (252 lb 4.8 oz)   SpO2 96%   BMI 50.96 kg/m²     Physical Exam  Vitals reviewed.   Constitutional:       General: She is not in acute distress.     Appearance: Normal appearance. She is obese.   Cardiovascular:      Rate and Rhythm: Normal rate and regular rhythm.      Heart sounds: No murmur heard.     No friction rub. No gallop.   Pulmonary:      Effort: No respiratory distress.      Breath sounds: No wheezing, rhonchi or rales.   Musculoskeletal:         General: No swelling, tenderness or deformity.      Right lower leg: No edema.      Left lower leg: No edema.   Skin:     General: Skin is warm and dry.      Findings: No lesion or rash.   Neurological:      General: No focal deficit present.      Mental Status: She is alert.   Psychiatric:         Mood and Affect: Mood normal.         Assessment/Plan:     1. Type 2 diabetes mellitus with hyperglycemia, without long-term current use of insulin  -Currently on metformin 500mg daily.  -Has not started Ozempic yet. Will resubmit Rx to pharmacy. 0.25mg weekly.   -Will start on low dose statin per current guidelines. Atorvastatin 10mg daily. " Advised patient to contact clinic if she does not tolerate.   2. Chronic midline low back pain without sciatica  -     X-Ray Lumbar Spine AP And Lateral; Future; Expected date: 01/17/2025       Follow up:     Follow up in about 6 months (around 7/17/2025) for Follow up diabetes with labs. In addition to their scheduled follow up, the patient has also been instructed to follow up on as needed basis.

## 2025-01-28 ENCOUNTER — TELEPHONE (OUTPATIENT)
Dept: FAMILY MEDICINE | Facility: CLINIC | Age: 45
End: 2025-01-28
Payer: MEDICAID

## 2025-01-28 NOTE — TELEPHONE ENCOUNTER
----- Message from Star Odonnell NP sent at 1/17/2025 10:33 AM CST -----  All lab results within acceptable ranges.    A1C has improved and is at goal.

## 2025-01-28 NOTE — TELEPHONE ENCOUNTER
----- Message from Star Odonnell NP sent at 1/23/2025  5:11 PM CST -----  Please inform patient of lab results.     1. Degenerative changes in the lumbar spine. Recommend PT.       Thanks for all you do,   Star

## 2025-01-28 NOTE — TELEPHONE ENCOUNTER
Patient given results states her sympotoms are not all the time and will get back with us when she is ready for the referral

## 2025-02-07 ENCOUNTER — OFFICE VISIT (OUTPATIENT)
Dept: FAMILY MEDICINE | Facility: CLINIC | Age: 45
End: 2025-02-07
Payer: MEDICAID

## 2025-02-07 ENCOUNTER — EXTERNAL HOME HEALTH (OUTPATIENT)
Dept: HOME HEALTH SERVICES | Facility: HOSPITAL | Age: 45
End: 2025-02-07
Payer: MEDICAID

## 2025-02-07 VITALS
WEIGHT: 245.5 LBS | SYSTOLIC BLOOD PRESSURE: 117 MMHG | TEMPERATURE: 98 F | RESPIRATION RATE: 20 BRPM | DIASTOLIC BLOOD PRESSURE: 77 MMHG | BODY MASS INDEX: 49.49 KG/M2 | OXYGEN SATURATION: 90 % | HEART RATE: 85 BPM | HEIGHT: 59 IN

## 2025-02-07 DIAGNOSIS — E66.813 CLASS 3 OBESITY WITH ALVEOLAR HYPOVENTILATION, SERIOUS COMORBIDITY, AND BODY MASS INDEX (BMI) OF 45.0 TO 49.9 IN ADULT: ICD-10-CM

## 2025-02-07 DIAGNOSIS — J45.31 MILD PERSISTENT ASTHMA WITH EXACERBATION: Primary | ICD-10-CM

## 2025-02-07 DIAGNOSIS — E11.65 TYPE 2 DIABETES MELLITUS WITH HYPERGLYCEMIA, WITHOUT LONG-TERM CURRENT USE OF INSULIN: ICD-10-CM

## 2025-02-07 DIAGNOSIS — E66.2 CLASS 3 OBESITY WITH ALVEOLAR HYPOVENTILATION, SERIOUS COMORBIDITY, AND BODY MASS INDEX (BMI) OF 45.0 TO 49.9 IN ADULT: ICD-10-CM

## 2025-02-07 PROCEDURE — 3078F DIAST BP <80 MM HG: CPT | Mod: CPTII,,,

## 2025-02-07 PROCEDURE — 1160F RVW MEDS BY RX/DR IN RCRD: CPT | Mod: CPTII,,,

## 2025-02-07 PROCEDURE — 3008F BODY MASS INDEX DOCD: CPT | Mod: CPTII,,,

## 2025-02-07 PROCEDURE — 99214 OFFICE O/P EST MOD 30 MIN: CPT | Mod: ,,,

## 2025-02-07 PROCEDURE — 3074F SYST BP LT 130 MM HG: CPT | Mod: CPTII,,,

## 2025-02-07 PROCEDURE — 1159F MED LIST DOCD IN RCRD: CPT | Mod: CPTII,,,

## 2025-02-07 PROCEDURE — 3044F HG A1C LEVEL LT 7.0%: CPT | Mod: CPTII,,,

## 2025-02-07 PROCEDURE — 3061F NEG MICROALBUMINURIA REV: CPT | Mod: CPTII,,,

## 2025-02-07 PROCEDURE — 3066F NEPHROPATHY DOC TX: CPT | Mod: CPTII,,,

## 2025-02-07 RX ORDER — PREDNISONE 20 MG/1
TABLET ORAL
Qty: 8 TABLET | Refills: 0 | Status: SHIPPED | OUTPATIENT
Start: 2025-02-07

## 2025-02-07 RX ORDER — LIRAGLUTIDE 6 MG/ML
0.6 INJECTION SUBCUTANEOUS DAILY
Qty: 3 ML | Refills: 11 | Status: CANCELLED | OUTPATIENT
Start: 2025-02-07 | End: 2026-02-07

## 2025-02-07 RX ORDER — TIRZEPATIDE 2.5 MG/.5ML
2.5 INJECTION, SOLUTION SUBCUTANEOUS
Qty: 2 ML | Refills: 0 | Status: SHIPPED | OUTPATIENT
Start: 2025-02-07 | End: 2025-02-11

## 2025-02-07 RX ORDER — AZITHROMYCIN 250 MG/1
TABLET, FILM COATED ORAL
Qty: 6 TABLET | Refills: 0 | Status: SHIPPED | OUTPATIENT
Start: 2025-02-07

## 2025-02-07 NOTE — LETTER
AUTHORIZATION FOR RELEASE OF   CONFIDENTIAL INFORMATION    Dear [facility],    We are seeing Mellissa Ann Milligan, date of birth 1980, in the clinic at [facility]. JUVENAL Myers is the patient's provider. Mellissa Ann Milligan has an outstanding lab/procedure at the time we reviewed her chart. In order to help keep her health information updated, she has authorized us to request the following medical record(s):        (  )  MAMMOGRAM                                      (  )  COLONOSCOPY      (  )  PAP SMEAR                                          (  )  OUTSIDE LAB RESULTS     (  )  DEXA SCAN                                          ( x )  EYE EXAM            (  )  FOOT EXAM                                          (  )  ENTIRE RECORD     (  )  OUTSIDE IMMUNIZATIONS                 (  )  _______________         Please fax records to [facility], Ka'Ryn Franklin, AGNP-C, [phone]     If you have any questions, please contact MELINDA Patrick at [phone]          Patient Name: Mellissa Ann Milligan  : 1980  Patient Phone #: [phone]

## 2025-02-07 NOTE — PROGRESS NOTES
Patient ID: [id number]     Chief Complaint: Cough, Shortness of Breath (Appointment with pulmonologist is 25 with Dr Armstrong), and Diabetes (Ozempic was denied by insurance)    HPI:     Mellissa Ann Milligan, a 44-year-old female, is here today for a follow-up appointment. She reports experiencing a cough and shortness of breath that began a few days ago. Her medical history includes recurrent asthma exacerbations, for which she has been hospitalized in the past. Currently, she is not using supplemental oxygen but utilizes 2 L nasal cannula as needed. She has an upcoming appointment with Dr. Armstrong on 2025.    Insurance has denied coverage for Ozempic, Mounjaro, and Victoza due to her A1C being below 6.5. Given her conditions of obesity, obstructive sleep apnea (JOSEPH), and hypoventilation, she would significantly benefit from these GLP-1 medications.    She has no additional complaints at this time.  Past Medical History:   Diagnosis Date    Asthma     Chronic sinusitis, unspecified     Dietary iron deficiency     Essential (primary) hypertension     Eustachian tube dysfunction, bilateral     DOMINIQUE (iron deficiency anemia)     Leukocytosis     Morbid obesity     Nasal congestion     Nasal polyps     Obesity hypoventilation syndrome     Obstructive sleep apnea     JOSEPH on CPAP     Prediabetes         Past Surgical History:   Procedure Laterality Date     SECTION      CHOLECYSTECTOMY      COLONOSCOPY  07/15/2021    Dr Ricky Hale    ESOPHAGOGASTRODUODENOSCOPY  2021    Dr Ricky Hale    [facility], WITH DACRYOCYSTORHINOSTOMY      NASAL TURBINATE REDUCTION      SINUS SURGERY          Social History     Socioeconomic History    Marital status:    Tobacco Use    Smoking status: Never    Smokeless tobacco: Never   Substance and Sexual Activity    Alcohol use: Never    Drug use: Never    Sexual activity: Yes     Partners: Male     Birth control/protection: None     Social Drivers of Health      Financial Resource Strain: Low Risk  (1/10/2025)    Overall Financial Resource Strain (CARDIA)     Difficulty of Paying Living Expenses: Not hard at all   Food Insecurity: No Food Insecurity (1/10/2025)    Hunger Vital Sign     Worried About Running Out of Food in the Last Year: Never true     Ran Out of Food in the Last Year: Never true   Transportation Needs: No Transportation Needs (10/13/2023)    PRAPARE - Transportation     Lack of Transportation (Medical): No     Lack of Transportation (Non-Medical): No   Physical Activity: Unknown (1/10/2025)    Exercise Vital Sign     Days of Exercise per Week: Patient declined     Minutes of Exercise per Session: 20 min   Stress: No Stress Concern Present (1/10/2025)    [employer] - Occupational Stress Questionnaire     Feeling of Stress : Not at all   Housing Stability: Low Risk  (10/13/2023)    Housing Stability Vital Sign     Unable to Pay for Housing in the Last Year: No     Number of Places Lived in the Last Year: 1     Unstable Housing in the Last Year: No        Current Outpatient Medications   Medication Instructions    albuterol (PROVENTIL/VENTOLIN HFA) 90 mcg/actuation inhaler INHALE 2 PUFFS BY MOUTH EVERY 4 HOURS AS NEEDED FOR WHEEZE OR FOR SHORTNESS OF BREATH    albuterol-ipratropium (DUO-NEB) 2.5 mg-0.5 mg/3 mL nebulizer solution NEBULIZE 1 VIAL 4 TIMES DAILY    atorvastatin (LIPITOR) 10 mg, Oral, Daily    azithromycin (Z-BRANDEN) 250 MG tablet Take 2 tablets by mouth on day 1; Take 1 tablet by mouth on days 2-5      blood sugar diagnostic Strp To check BG twice daily, to use with insurance preferred meter    blood-glucose meter kit To check BG daily, to use with insurance preferred meter    budesonide-formoterol 160-4.5 mcg (SYMBICORT) 160-4.5 mcg/actuation HFAA 2 puffs, 2 times daily    ferrous gluconate (FERGON) 324 MG tablet TAKE 1 TABLET BY MOUTH TWICE A DAY    fluticasone propionate (FLONASE) 50 mcg/actuation nasal spray  "SPRAY 2 SPRAYS INTO EACH NOSTRIL ONCE DAILY.    gabapentin (NEURONTIN) 300 mg, Oral, 3 times daily    lancets Misc To check BG daily, to use with insurance preferred meter    losartan (COZAAR) 50 mg, Oral    metFORMIN (GLUCOPHAGE-XR) 500 mg, Oral, With breakfast    pantoprazole (PROTONIX) 40 mg, Oral, Daily    predniSONE (DELTASONE) 20 MG tablet One tablet orally twice daily for 3 days and then once daily for 2 days    tiotropium (SPIRIVA WITH HANDIHALER) 18 mcg inhalation capsule INHALE 2 INHALATIONS OF ONE CAPSULE ONCE DAILY    TRULICITY 0.75 mg, Subcutaneous, Every 7 days       Review of patient's allergies indicates:  No Known Allergies     Patient Care Team:  Sebastian Davila DO as PCP - General (Family Medicine)  Thee Raymond MD as Consulting Physician (Otolaryngology)  Ricky Hale MD as Consulting Physician (Gastroenterology)  Marco Armstrong MD as Consulting Physician (Pulmonary Disease)  Kenyatta Jolley, RN as Diabetes Educator (Diabetes)     Subjective:     Review of Systems    12 point review of systems conducted, negative except as stated in the history of present illness. See HPI for details.    Objective:     Visit Vitals  /77 (BP Location: Right arm, Patient Position: Sitting)   Pulse 85   Temp 98.1 °F (36.7 °C)   Resp 20   Ht 4' 11" (1.499 m)   Wt 111.4 kg (245 lb 8 oz)   SpO2 (!) 90%   BMI 49.58 kg/m²       Physical Exam  Vitals and nursing note reviewed.   Constitutional:       General: She is not in acute distress.     Appearance: She is obese. She is not ill-appearing.   HENT:      Head: Normocephalic and atraumatic.      Mouth/Throat:      Mouth: Mucous membranes are moist.      Pharynx: Oropharynx is clear.   Eyes:      General: No scleral icterus.     Extraocular Movements: Extraocular movements intact.      Conjunctiva/sclera: Conjunctivae normal.      Pupils: Pupils are equal, round, and reactive to light.   Neck:      Vascular: No carotid bruit.   Cardiovascular:    "   Rate and Rhythm: Normal rate and regular rhythm.      Heart sounds: No murmur heard.     No friction rub. No gallop.   Pulmonary:      Effort: Pulmonary effort is normal. No accessory muscle usage or respiratory distress.      Breath sounds: Wheezing present. No decreased breath sounds, rhonchi or rales.      Comments: Shortness of breath upon exertion.   Abdominal:      General: Abdomen is flat. Bowel sounds are normal. There is no distension.      Palpations: Abdomen is soft. There is no mass.      Tenderness: There is no abdominal tenderness.   Musculoskeletal:         General: Normal range of motion.      Cervical back: Normal range of motion and neck supple.   Skin:     General: Skin is warm and dry.   Neurological:      General: No focal deficit present.      Mental Status: She is alert.   Psychiatric:         Mood and Affect: Mood normal.       Labs Reviewed:     Chemistry:  Lab Results   Component Value Date     01/17/2025    K 3.9 01/17/2025    BUN 7.0 01/17/2025    CREATININE 0.66 01/17/2025    EGFRNORACEVR >60 01/17/2025    GLUCOSE 111 (H) 01/17/2025    CALCIUM 9.7 01/17/2025    ALKPHOS 81 01/17/2025    LABPROT 7.3 01/17/2025    ALBUMIN 3.6 01/17/2025    BILIDIR 0.1 09/30/2021    IBILI 0.10 09/30/2021    AST 19 01/17/2025    ALT 33 01/17/2025    ZNIQYAPW37GX 30.1 10/03/2023    TSH 1.411 10/10/2024    VYOZZQ4XLHO 1.25 08/24/2017        Lab Results   Component Value Date    HGBA1C 5.7 01/17/2025        Hematology:  Lab Results   Component Value Date    WBC 7.00 10/10/2024    HGB 13.1 10/10/2024    HCT 39.0 10/10/2024     10/10/2024       Lipid Panel:  Lab Results   Component Value Date    CHOL 148 01/17/2025    HDL 36 01/17/2025    LDL 87.00 01/17/2025    TRIG 127 01/17/2025    TOTALCHOLEST 4 01/17/2025        Urine:  Lab Results   Component Value Date    APPEARANCEUA CLEAR 07/02/2019    PROTEINUA Negative 07/02/2019    LEUKOCYTESUR Negative 07/02/2019    RBCUA 0-3 07/02/2019    WBCUA None  Seen 07/02/2019    BACTERIA Rare (A) 07/02/2019    CREATRANDUR 113.9 (H) 01/17/2025     Assessment:       ICD-10-CM ICD-9-CM   1. Mild persistent asthma with exacerbation  J45.31 493.92   2. Type 2 diabetes mellitus with hyperglycemia, without long-term current use of insulin  E11.65 250.00     790.29   3. Class 3 obesity with alveolar hypoventilation, serious comorbidity, and body mass index (BMI) of 45.0 to 49.9 in adult  E66.813 278.03    E66.2 V85.42    Z68.42       Plan:     1. Mild persistent asthma with exacerbation  Assessment & Plan:  Start Azithromycin as prescribed + Prednisone Taper.   Continue Duonebs + Symbicort.     Use rescue inhaler when needed, use nebulizer for wheezing or URI.  Report upper respiratory infections early and seek treatment.  Asthma Action Plan reviewed.       Orders:  -     azithromycin (Z-BRANDEN) 250 MG tablet; Take 2 tablets by mouth on day 1; Take 1 tablet by mouth on days 2-5  Dispense: 6 tablet; Refill: 0  -     predniSONE (DELTASONE) 20 MG tablet; One tablet orally twice daily for 3 days and then once daily for 2 days  Dispense: 8 tablet; Refill: 0    2. Type 2 diabetes mellitus with hyperglycemia, without long-term current use of insulin  Assessment & Plan:  Lab Results   Component Value Date    HGBA1C 5.7 01/17/2025    HGBA1C 6.1 04/15/2024    LDL 87.00 01/17/2025    CREATININE 0.66 01/17/2025      A1C has improved.   Will order Trulicity.   Continue Metformin 500 mg daily.     Follow ADA Diet. Avoid soda, simple sweets, and limit rice/pasta/breads/starches (no more than 45-50 grams per meal).  Maintain healthy weight with goal BMI <30.  Exercise 5 times per week for 30 minutes per day.    Orders:  -     Discontinue: tirzepatide (MOUNJARO) 2.5 mg/0.5 mL PnIj; Inject 2.5 mg into the skin every 7 days.  Dispense: 2 mL; Refill: 0  -     dulaglutide (TRULICITY) 0.75 mg/0.5 mL pen injector; Inject 0.75 mg into the skin every 7 days.  Dispense: 4 pen ; Refill: 0    3. Class 3  obesity with alveolar hypoventilation, serious comorbidity, and body mass index (BMI) of 45.0 to 49.9 in adult  Assessment & Plan:  BMI has improved since October.     Body mass index is 49.58 kg/m².  Goal BMI <30.  Exercise as tolerated (Patient has restrictions due to asthma).     Avoid soda, simple sugars, excessive rice, potatoes or bread. Limit fast foods and fried foods.  Choose complex carbs in moderation (example: green vegetables, beans, oatmeal). Eat plenty of fresh fruits and vegetables with lean meats daily.  Do not skip meals. Eat a balanced portion size.        Follow up in about 6 months (around 8/7/2025) for Follow Up. In addition to their scheduled follow up, the patient has also been instructed to follow up on as needed basis.     Star Odonnell NP

## 2025-02-11 DIAGNOSIS — J45.909 ASTHMA, UNSPECIFIED ASTHMA SEVERITY, UNSPECIFIED WHETHER COMPLICATED, UNSPECIFIED WHETHER PERSISTENT: ICD-10-CM

## 2025-02-11 PROBLEM — J45.31 MILD PERSISTENT ASTHMA WITH EXACERBATION: Status: ACTIVE | Noted: 2025-02-11

## 2025-02-11 RX ORDER — DULAGLUTIDE 0.75 MG/.5ML
0.75 INJECTION, SOLUTION SUBCUTANEOUS
Qty: 4 PEN | Refills: 0 | Status: SHIPPED | OUTPATIENT
Start: 2025-02-11 | End: 2025-03-13

## 2025-02-11 RX ORDER — IPRATROPIUM BROMIDE AND ALBUTEROL SULFATE 2.5; .5 MG/3ML; MG/3ML
SOLUTION RESPIRATORY (INHALATION) 4 TIMES DAILY
Qty: 180 ML | Refills: 5 | Status: SHIPPED | OUTPATIENT
Start: 2025-02-11

## 2025-02-11 NOTE — ASSESSMENT & PLAN NOTE
Start Azithromycin as prescribed + Prednisone Taper.   Continue Duonebs + Symbicort.     Use rescue inhaler when needed, use nebulizer for wheezing or URI.  Report upper respiratory infections early and seek treatment.  Asthma Action Plan reviewed.

## 2025-02-11 NOTE — ASSESSMENT & PLAN NOTE
BMI has improved since October.     Body mass index is 49.58 kg/m².  Goal BMI <30.  Exercise as tolerated (Patient has restrictions due to asthma).     Avoid soda, simple sugars, excessive rice, potatoes or bread. Limit fast foods and fried foods.  Choose complex carbs in moderation (example: green vegetables, beans, oatmeal). Eat plenty of fresh fruits and vegetables with lean meats daily.  Do not skip meals. Eat a balanced portion size.

## 2025-02-11 NOTE — ASSESSMENT & PLAN NOTE
Lab Results   Component Value Date    HGBA1C 5.7 01/17/2025    HGBA1C 6.1 04/15/2024    LDL 87.00 01/17/2025    CREATININE 0.66 01/17/2025      A1C has improved.   Will order Trulicity.   Continue Metformin 500 mg daily.     Follow ADA Diet. Avoid soda, simple sweets, and limit rice/pasta/breads/starches (no more than 45-50 grams per meal).  Maintain healthy weight with goal BMI <30.  Exercise 5 times per week for 30 minutes per day.

## 2025-02-19 DIAGNOSIS — J45.50 SEVERE PERSISTENT ASTHMA WITHOUT COMPLICATION: ICD-10-CM

## 2025-02-19 RX ORDER — BUDESONIDE AND FORMOTEROL FUMARATE 160; 4.5 UG/1; UG/1
2 AEROSOL, METERED RESPIRATORY (INHALATION) 2 TIMES DAILY
Qty: 10.3 G | Refills: 5 | Status: SHIPPED | OUTPATIENT
Start: 2025-02-19

## 2025-02-21 NOTE — PROGRESS NOTES
[facility]  Otolaryngology Clinic Note    Mellissa Ann Milligan  YOB: 1980    Chief Complaint:   Chief Complaint   Patient presents with    referral: Hearing Loss        HPI: 2025: 44 y.o. female referred for HL. States this began in October, that her ears feel stopped up. There was initially some fluctuation but states it has become more constant. She has had similar symptoms in the past that resolved. Endorses occasional tinnitus. Denies any previous otologic hx. She was sick around the time symptoms began and states she does not feel that it has ever fully resolved, that she still has congestion on her chest. She has been on 2 rounds of abx without improvement in otologic symptoms. Hx of loud noise exposure around her stepdad who was a  and from listening to loud music, however, felt she was hearing well before October. Endorses chronic nasal congestion and rhinitis for which she takes an OTC antihistamine & uses flonase once a day. Hx of JOSEPH for which she wears CPAP.    ROS:   10-point review of systems negative except per HPI      Review of patient's allergies indicates:  No Known Allergies    Past Medical History:   Diagnosis Date    Asthma     Chronic sinusitis, unspecified     Diabetes mellitus [id number]    Dietary iron deficiency     Essential (primary) hypertension     Eustachian tube dysfunction, bilateral     DOMINIQUE (iron deficiency anemia)     Leukocytosis     Morbid obesity     Nasal congestion     Nasal polyps     Obesity hypoventilation syndrome     Obstructive sleep apnea     JOSEPH on CPAP     Prediabetes        Past Surgical History:   Procedure Laterality Date     SECTION      CHOLECYSTECTOMY      COLONOSCOPY  07/15/2021    Dr Ricky Hale    ESOPHAGOGASTRODUODENOSCOPY  2021    Dr Ricky Hale    FESS, WITH DACRYOCYSTORHINOSTOMY      NASAL TURBINATE REDUCTION      SINUS SURGERY         Social History[1]    Family History   Problem Relation Name Age  of Onset    Diabetes Mother Chitra henriquez     COPD Mother Chitra henriquez     Diabetes Maternal Grandmother Francia henriquez        Encounter Medications[2]    Physical Exam:  Vitals:    02/24/25 0932   BP: 111/72   BP Location: Right arm   Patient Position: Sitting   Pulse: 96   Temp: 98 °F (36.7 °C)   TempSrc: Oral       Physical Exam   General: NAD, voice normal  Neuro: AAO, CN II - XII grossly intact  Head/ Face: NCAT, symmetric, sensations intact bilaterally  Eyes: EOMI, PERRL  Ears: externally normal with grossly normal hearing. Ho lateralizes left. AC > BC b/l  *exam under microscopy  AD: EAC patent, TM intact, no middle ear effusion, + superior retraction. Unable to auto insufflate  AS: EAC patent, TM intact, no middle ear effusion, + superior retraction. Unable to auto insufflate  Nose: bilateral nares patent, midline septum, no rhinorrhea, no external deformity, +ITH  OC/OP: MMM, no intraoral lesions, no trismus, dentition is moderate, no uvular deviation, bilaterally symmetric soft palate elevation, palatoglossus and palatopharyngeal fold wnl; tonsils are symmetric and 1+  Indirect laryngoscopy: deferred due to patient intolerance  Neck: soft, supple, no LAD, normal ROM, no thyromegaly  Respiratory: nonlabored, no wheezing, bilateral chest rise  Cardiovascular: RRR  Gastrointestinal: S NT ND  Skin: warm, no lesions  Musculoskeletal: 5/5 strength  Psych: Appropriate affect/mood     Pertinent Data:  ? LABS:  ? AUDIO:           ? PATH:      Imaging:   I personally reviewed the following images:        Assessment/Plan:  44 y.o. female with AR, ETD, subjective HL.   - NSI BID  - Flonase BID (Reviewed correct technique)  - Auto insufflate  - Audio  - Cont f/u with PCP and Dr. Armstrong regarding cough & chest congestion  - RTC 3mo    Adriana Hargrove NP             [1]   Social History  Socioeconomic History    Marital status:    Tobacco Use    Smoking status: Never    Smokeless tobacco: Never   Substance  and Sexual Activity    Alcohol use: Never    Drug use: Never    Sexual activity: Yes     Partners: Male     Birth control/protection: None     Social Drivers of Health     Financial Resource Strain: Low Risk  (1/10/2025)    Overall Financial Resource Strain (CARDIA)     Difficulty of Paying Living Expenses: Not hard at all   Food Insecurity: No Food Insecurity (1/10/2025)    Hunger Vital Sign     Worried About Running Out of Food in the Last Year: Never true     Ran Out of Food in the Last Year: Never true   Transportation Needs: No Transportation Needs (10/13/2023)    PRAPARE - Transportation     Lack of Transportation (Medical): No     Lack of Transportation (Non-Medical): No   Physical Activity: Unknown (1/10/2025)    Exercise Vital Sign     Days of Exercise per Week: Patient declined     Minutes of Exercise per Session: 20 min   Stress: No Stress Concern Present (1/10/2025)    [employer] [place] [employer] - Occupational Stress Questionnaire     Feeling of Stress : Not at all   Housing Stability: Low Risk  (10/13/2023)    Housing Stability Vital Sign     Unable to Pay for Housing in the Last Year: No     Number of Places Lived in the Last Year: 1     Unstable Housing in the Last Year: No   [2]   Outpatient Encounter Medications as of 2/24/2025   Medication Sig Dispense Refill    albuterol (PROVENTIL/VENTOLIN HFA) 90 mcg/actuation inhaler INHALE 2 PUFFS BY MOUTH EVERY 4 HOURS AS NEEDED FOR WHEEZE OR FOR SHORTNESS OF BREATH 6.7 g 11    albuterol-ipratropium (DUO-NEB) 2.5 mg-0.5 mg/3 mL nebulizer solution INHALE 1 VIAL VIA NEBULIZER FOUR TIMES A  mL 5    atorvastatin (LIPITOR) 10 MG tablet Take 1 tablet (10 mg total) by mouth once daily. 30 tablet 11    blood sugar diagnostic Strp To check BG twice daily, to use with insurance preferred meter 120 each 11    blood-glucose meter kit To check BG daily, to use with insurance preferred meter 1 each 0    budesonide-formoterol 160-4.5 mcg (BREYNA)  160-4.5 mcg/actuation HFAA TAKE 2 PUFFS BY MOUTH TWICE A DAY 10.3 g 5    ferrous gluconate (FERGON) 324 MG tablet TAKE 1 TABLET BY MOUTH TWICE A DAY 60 tablet 5    fluticasone propionate (FLONASE) 50 mcg/actuation nasal spray SPRAY 2 SPRAYS INTO EACH NOSTRIL ONCE DAILY. 48 mL 2    gabapentin (NEURONTIN) 300 MG capsule Take 1 capsule (300 mg total) by mouth 3 (three) times daily. 90 capsule 5    lancets Misc To check BG daily, to use with insurance preferred meter 120 each 11    losartan (COZAAR) 50 MG tablet TAKE 1 TABLET BY MOUTH EVERY DAY 90 tablet 1    OZEMPIC 0.25 mg or 0.5 mg (2 mg/3 mL) pen injector       tiotropium (SPIRIVA WITH HANDIHALER) 18 mcg inhalation capsule INHALE 2 INHALATIONS OF ONE CAPSULE ONCE DAILY 30 capsule 6    azithromycin (Z-BRANDEN) 250 MG tablet Take 2 tablets by mouth on day 1; Take 1 tablet by mouth on days 2-5 (Patient not taking: Reported on 2/24/2025) 6 tablet 0    dulaglutide (TRULICITY) 0.75 mg/0.5 mL pen injector Inject 0.75 mg into the skin every 7 days. (Patient not taking: Reported on 2/24/2025) 4 pen 0    metFORMIN (GLUCOPHAGE-XR) 500 MG ER 24hr tablet Take 1 tablet (500 mg total) by mouth daily with breakfast. (Patient not taking: Reported on 2/24/2025) 90 tablet 3    pantoprazole (PROTONIX) 40 MG tablet Take 1 tablet (40 mg total) by mouth once daily. (Patient not taking: Reported on 2/24/2025) 30 tablet 11    predniSONE (DELTASONE) 20 MG tablet One tablet orally twice daily for 3 days and then once daily for 2 days (Patient not taking: Reported on 2/24/2025) 8 tablet 0    [DISCONTINUED] albuterol-ipratropium (DUO-NEB) 2.5 mg-0.5 mg/3 mL nebulizer solution NEBULIZE 1 VIAL 4 TIMES DAILY 180 mL 5    [DISCONTINUED] budesonide-formoterol 160-4.5 mcg (SYMBICORT) 160-4.5 mcg/actuation HFAA TAKE 2 PUFFS BY MOUTH TWICE A DAY 10.2 g 5    [DISCONTINUED] semaglutide (OZEMPIC) 0.25 mg or 0.5 mg (2 mg/3 mL) pen injector Inject 0.25 mg into the skin every 7 days. (Patient not taking: Reported  on 2/7/2025) 3 mL 0    [DISCONTINUED] tirzepatide (MOUNJARO) 2.5 mg/0.5 mL PnIj Inject 2.5 mg into the skin every 7 days. 2 mL 0     No facility-administered encounter medications on file as of 2/24/2025.

## 2025-02-24 ENCOUNTER — OFFICE VISIT (OUTPATIENT)
Dept: OTOLARYNGOLOGY | Facility: CLINIC | Age: 45
End: 2025-02-24
Payer: MEDICAID

## 2025-02-24 VITALS — HEART RATE: 96 BPM | DIASTOLIC BLOOD PRESSURE: 72 MMHG | SYSTOLIC BLOOD PRESSURE: 111 MMHG | TEMPERATURE: 98 F

## 2025-02-24 DIAGNOSIS — J30.9 ALLERGIC RHINITIS, UNSPECIFIED SEASONALITY, UNSPECIFIED TRIGGER: ICD-10-CM

## 2025-02-24 DIAGNOSIS — R09.81 NASAL CONGESTION: ICD-10-CM

## 2025-02-24 DIAGNOSIS — H91.93 DECREASED HEARING OF BOTH EARS: ICD-10-CM

## 2025-02-24 DIAGNOSIS — H91.90 HEARING DISORDER, UNSPECIFIED LATERALITY: Primary | ICD-10-CM

## 2025-02-24 DIAGNOSIS — H69.93 ETD (EUSTACHIAN TUBE DYSFUNCTION), BILATERAL: ICD-10-CM

## 2025-02-24 PROCEDURE — 99214 OFFICE O/P EST MOD 30 MIN: CPT | Mod: PBBFAC | Performed by: NURSE PRACTITIONER

## 2025-02-24 PROCEDURE — 92504 EAR MICROSCOPY EXAMINATION: CPT | Mod: 50,PBBFAC | Performed by: NURSE PRACTITIONER

## 2025-02-24 RX ORDER — FLUTICASONE PROPIONATE 50 MCG
2 SPRAY, SUSPENSION (ML) NASAL 2 TIMES DAILY
Qty: 48 G | Refills: 3 | Status: SHIPPED | OUTPATIENT
Start: 2025-02-24

## 2025-02-24 RX ORDER — SEMAGLUTIDE 0.68 MG/ML
INJECTION, SOLUTION SUBCUTANEOUS
COMMUNITY
Start: 2025-02-21

## 2025-02-27 ENCOUNTER — OFFICE VISIT (OUTPATIENT)
Dept: FAMILY MEDICINE | Facility: CLINIC | Age: 45
End: 2025-02-27
Payer: MEDICAID

## 2025-02-27 VITALS
DIASTOLIC BLOOD PRESSURE: 63 MMHG | HEIGHT: 59 IN | HEART RATE: 95 BPM | OXYGEN SATURATION: 93 % | TEMPERATURE: 98 F | RESPIRATION RATE: 20 BRPM | BODY MASS INDEX: 49.39 KG/M2 | WEIGHT: 245 LBS | SYSTOLIC BLOOD PRESSURE: 106 MMHG

## 2025-02-27 DIAGNOSIS — J45.909 ASTHMA, UNSPECIFIED ASTHMA SEVERITY, UNSPECIFIED WHETHER COMPLICATED, UNSPECIFIED WHETHER PERSISTENT: ICD-10-CM

## 2025-02-27 DIAGNOSIS — I10 PRIMARY HYPERTENSION: ICD-10-CM

## 2025-02-27 DIAGNOSIS — L03.311 CELLULITIS, ABDOMINAL WALL: Primary | ICD-10-CM

## 2025-02-27 DIAGNOSIS — J45.50 SEVERE PERSISTENT ASTHMA WITHOUT COMPLICATION: Chronic | ICD-10-CM

## 2025-02-27 DIAGNOSIS — E11.65 TYPE 2 DIABETES MELLITUS WITH HYPERGLYCEMIA, WITHOUT LONG-TERM CURRENT USE OF INSULIN: ICD-10-CM

## 2025-02-27 PROCEDURE — 3008F BODY MASS INDEX DOCD: CPT | Mod: CPTII,,,

## 2025-02-27 PROCEDURE — 1160F RVW MEDS BY RX/DR IN RCRD: CPT | Mod: CPTII,,,

## 2025-02-27 PROCEDURE — G2211 COMPLEX E/M VISIT ADD ON: HCPCS | Mod: ,,,

## 2025-02-27 PROCEDURE — 3074F SYST BP LT 130 MM HG: CPT | Mod: CPTII,,,

## 2025-02-27 PROCEDURE — 3078F DIAST BP <80 MM HG: CPT | Mod: CPTII,,,

## 2025-02-27 PROCEDURE — 1159F MED LIST DOCD IN RCRD: CPT | Mod: CPTII,,,

## 2025-02-27 PROCEDURE — 3066F NEPHROPATHY DOC TX: CPT | Mod: CPTII,,,

## 2025-02-27 PROCEDURE — 3044F HG A1C LEVEL LT 7.0%: CPT | Mod: CPTII,,,

## 2025-02-27 PROCEDURE — 99214 OFFICE O/P EST MOD 30 MIN: CPT | Mod: ,,,

## 2025-02-27 PROCEDURE — 4010F ACE/ARB THERAPY RXD/TAKEN: CPT | Mod: CPTII,,,

## 2025-02-27 PROCEDURE — 3061F NEG MICROALBUMINURIA REV: CPT | Mod: CPTII,,,

## 2025-02-27 RX ORDER — LOSARTAN POTASSIUM 50 MG/1
50 TABLET ORAL
Qty: 90 TABLET | Refills: 1 | Status: SHIPPED | OUTPATIENT
Start: 2025-02-27

## 2025-02-27 RX ORDER — SULFAMETHOXAZOLE AND TRIMETHOPRIM 800; 160 MG/1; MG/1
1 TABLET ORAL 2 TIMES DAILY
Qty: 14 TABLET | Refills: 0 | Status: SHIPPED | OUTPATIENT
Start: 2025-02-27 | End: 2025-03-06

## 2025-02-27 RX ORDER — TIOTROPIUM BROMIDE 18 UG/1
CAPSULE ORAL; RESPIRATORY (INHALATION)
Qty: 30 CAPSULE | Refills: 6 | Status: SHIPPED | OUTPATIENT
Start: 2025-02-27

## 2025-02-27 NOTE — ASSESSMENT & PLAN NOTE
Clarified that Metformin can be taken alongside Ozempic for diabetes management.    Continued Metformin 500 mg daily.  Continued Ozempic 0.25 mg subcutaneously every 7 days (started Monday).    Instructed the patient to check glucose levels daily and bring results in 3 weeks.  Venus to check weight and blood sugars daily, and record readings to bring to next appointment.

## 2025-02-27 NOTE — ASSESSMENT & PLAN NOTE
Explained to the patient that asthma is a chronic condition, likely to cause some persistent cough.  Continued Advair, Breo, Flonase, Spiriva, and DuoNeb as previously prescribed.    Avoid/limit triggers such as pollen, dust, mold and animal dander.  Avoid smoke, strong chemicals, and strong cleaning products in addition to strong perfumes/colognes.  Use rescue inhaler when needed, use nebulizer for wheezing or URI.  Report upper respiratory infections early and seek treatment.  Asthma Action Plan reviewed.

## 2025-02-27 NOTE — ASSESSMENT & PLAN NOTE
Prescribed Bactrim DS 1 tablet orally twice daily for 7 days for cellulitis.    Noted that the patient reports a bite on stomach since Monday, which is indurated and erythematous.  Examined the area and diagnosed cellulitis in the right upper quadrant.    Wound Care: Twice daily wound care as discussed.   Pain: Take OTC Tylenol or Ibuprofen per package instructions as needed for pain.  Loosen the bandage if needed.     Present to the Emergency Department for any significant change or worsening symptoms including worsening redness, swelling, purulent discharge, fever, body aches, or chills.

## 2025-02-27 NOTE — PROGRESS NOTES
Patient ID: [id number]     Chief Complaint: Insect Bite (Located on abdomen and red around it/Intermittent burning/) and Cough (Continues coughing yellow mucus)    HPI:     Mellissa Milligan is a 44 year old female who presents today with a skin lesion on her abdomen.    SKIN LESION:  She reports a lesion that started Monday appearing as a bite. She denies applying any treatment to the area despite home health nurse's recommendation for Neosporin application.    RESPIRATORY:  She has asthma with persistent cough and follows with pulmonologist Dr. Lara in [facility]. She uses 2L oxygen continuously when at home.    CURRENT MEDICATIONS:  She started Ozempic 0.25 mg on Monday. She continues DuoNeb, Advair, Breo, Flonase, and Spiriva for respiratory management.          Past Medical History:   Diagnosis Date    Asthma     Chronic sinusitis, unspecified     Diabetes mellitus 601    Dietary iron deficiency     Essential (primary) hypertension     Eustachian tube dysfunction, bilateral     DOMINIQUE (iron deficiency anemia)     Leukocytosis     Morbid obesity     Nasal congestion     Nasal polyps     Obesity hypoventilation syndrome     Obstructive sleep apnea     JOSEPH on CPAP     Prediabetes         Past Surgical History:   Procedure Laterality Date     SECTION      CHOLECYSTECTOMY      COLONOSCOPY  07/15/2021    Dr Ricky Hale    ESOPHAGOGASTRODUODENOSCOPY  2021    Dr [facility] WITH DACRYOCYSTORHINOSTOMY      NASAL TURBINATE REDUCTION      SINUS SURGERY          Social History     Socioeconomic History    Marital status:    Tobacco Use    Smoking status: Never    Smokeless tobacco: Never   Substance and Sexual Activity    Alcohol use: Never    Drug use: Never    Sexual activity: Yes     Partners: Male     Birth control/protection: None     Social Drivers of Health     Financial Resource Strain: Low Risk  (1/10/2025)    Overall Financial Resource Strain (CARDIA)     Difficulty of Paying Living  Expenses: Not hard at all   Food Insecurity: No Food Insecurity (1/10/2025)    Hunger Vital Sign     Worried About Running Out of Food in the Last Year: Never true     Ran Out of Food in the Last Year: Never true   Transportation Needs: No Transportation Needs (10/13/2023)    PRAPARE - Transportation     Lack of Transportation (Medical): No     Lack of Transportation (Non-Medical): No   Physical Activity: Unknown (1/10/2025)    Exercise Vital Sign     Days of Exercise per Week: Patient declined     Minutes of Exercise per Session: 20 min   Stress: No Stress Concern Present (1/10/2025)    [employer] [employer] - Occupational Stress Questionnaire     Feeling of Stress : Not at all   Housing Stability: Low Risk  (10/13/2023)    Housing Stability Vital Sign     Unable to Pay for Housing in the Last Year: No     Number of Places Lived in the Last Year: 1     Unstable Housing in the Last Year: No        Current Outpatient Medications   Medication Instructions    albuterol (PROVENTIL/VENTOLIN HFA) 90 mcg/actuation inhaler INHALE 2 PUFFS BY MOUTH EVERY 4 HOURS AS NEEDED FOR WHEEZE OR FOR SHORTNESS OF BREATH    albuterol-ipratropium (DUO-NEB) 2.5 mg-0.5 mg/3 mL nebulizer solution Nebulization, 4 times daily    atorvastatin (LIPITOR) 10 mg, Oral, Daily    blood sugar diagnostic Strp To check BG twice daily, to use with insurance preferred meter    blood-glucose meter kit To check BG daily, to use with insurance preferred meter    budesonide-formoterol 160-4.5 mcg (BREYNA) 160-4.5 mcg/actuation HFAA 2 puffs, 2 times daily    ferrous gluconate (FERGON) 324 MG tablet TAKE 1 TABLET BY MOUTH TWICE A DAY    fluticasone propionate (FLONASE) 100 mcg, Each Nostril, 2 times daily    gabapentin (NEURONTIN) 300 mg, Oral, 3 times daily    lancets Misc To check BG daily, to use with insurance preferred meter    losartan (COZAAR) 50 mg, Oral    OZEMPIC 0.25 mg or 0.5 mg (2 mg/3 mL) pen injector      "sulfamethoxazole-trimethoprim 800-160mg (BACTRIM DS) 800-160 mg Tab 1 tablet, Oral, 2 times daily    tiotropium (SPIRIVA) 18 mcg inhalation capsule INHALE 2 INHALATIONS OF ONE CAPSULE ONCE DAILY       Review of patient's allergies indicates:  No Known Allergies     Patient Care Team:  Sebastian Davila DO as PCP - General (Family Medicine)  Thee Raymond MD as Consulting Physician (Otolaryngology)  Ricky Hale MD as Consulting Physician (Gastroenterology)  Marco Armstrong MD as Consulting Physician (Pulmonary Disease)  Kenyatta Jolley, RN as Diabetes Educator (Diabetes)     Subjective:     Review of Systems    12 point review of systems conducted, negative except as stated in the history of present illness. See HPI for details.    Objective:     Visit Vitals  /63 (BP Location: Right arm, Patient Position: Sitting)   Pulse 95   Temp 97.9 °F (36.6 °C)   Resp 20   Ht 4' 11" (1.499 m)   Wt 111.1 kg (245 lb)   LMP 01/23/2025 (Exact Date)   SpO2 (!) 93%   PF (!) 2 L/min   BMI 49.48 kg/m²       Physical Exam    General: No acute distress. Well-developed. Well-nourished.  Eyes: EOMI. Sclerae anicteric.  HENT: Normocephalic. Atraumatic. Nares patent. Moist oral mucosa.  Ears: Bilateral TMs clear. Bilateral EACs clear.  Cardiovascular: Regular rate. Regular rhythm. No murmurs. No rubs. No gallops. Normal S1, S2.  Respiratory: Normal respiratory effort. Clear to auscultation bilaterally. No rales. No rhonchi. No wheezing.  Abdomen: Soft. Non-tender. Non-distended. Normoactive bowel sounds.  Musculoskeletal: No  obvious deformity.  Extremities: No lower extremity edema.  Neurological: Alert & oriented x3. No slurred speech. Normal gait.  Psychiatric: Normal mood. Normal affect. Good insight. Good judgment.  Skin: Warm. Dry. Induration and Erythema noted to the RUQ.          Labs Reviewed:     Chemistry:  Lab Results   Component Value Date     01/17/2025    K 3.9 01/17/2025    BUN 7.0 01/17/2025    " CREATININE 0.66 01/17/2025    EGFRNORACEVR >60 01/17/2025    GLUCOSE 111 (H) 01/17/2025    CALCIUM 9.7 01/17/2025    ALKPHOS 81 01/17/2025    LABPROT 7.3 01/17/2025    ALBUMIN 3.6 01/17/2025    BILIDIR 0.1 09/30/2021    IBILI 0.10 09/30/2021    AST 19 01/17/2025    ALT 33 01/17/2025    [id number] 30.1 10/03/2023    TSH 1.411 10/10/2024    BVBYOH6ISSC 1.25 08/24/2017        Lab Results   Component Value Date    HGBA1C 5.7 01/17/2025        Hematology:  Lab Results   Component Value Date    WBC 7.00 10/10/2024    HGB 13.1 10/10/2024    HCT 39.0 10/10/2024     10/10/2024       Lipid Panel:  Lab Results   Component Value Date    CHOL 148 01/17/2025    HDL 36 01/17/2025    LDL 87.00 01/17/2025    TRIG 127 01/17/2025    TOTALCHOLEST 4 01/17/2025        Urine:  Lab Results   Component Value Date    APPEARANCEUA CLEAR 07/02/2019    PROTEINUA Negative 07/02/2019    LEUKOCYTESUR Negative 07/02/2019    RBCUA 0-3 07/02/2019    WBCUA None Seen 07/02/2019    BACTERIA Rare (A) 07/02/2019    CREATRANDUR 113.9 (H) 01/17/2025     Assessment:       ICD-10-CM ICD-9-CM   1. Cellulitis, abdominal wall  L03.311 682.2   2. Type 2 diabetes mellitus with hyperglycemia, without long-term current use of insulin  E11.65 250.00     790.29   3. Severe persistent asthma without complication  J45.50 493.90      Plan:     1. Cellulitis, abdominal wall  Assessment & Plan:  Prescribed Bactrim DS 1 tablet orally twice daily for 7 days for cellulitis.    Noted that the patient reports a bite on stomach since Monday, which is indurated and erythematous.  Examined the area and diagnosed cellulitis in the right upper quadrant.    Wound Care: Twice daily wound care as discussed.   Pain: Take OTC Tylenol or Ibuprofen per package instructions as needed for pain.  Loosen the bandage if needed.     Present to the Emergency Department for any significant change or worsening symptoms including worsening redness, swelling, purulent discharge, fever, body  aches, or chills.    Orders:  -     sulfamethoxazole-trimethoprim 800-160mg (BACTRIM DS) 800-160 mg Tab; Take 1 tablet by mouth 2 (two) times daily. for 7 days  Dispense: 14 tablet; Refill: 0    2. Type 2 diabetes mellitus with hyperglycemia, without long-term current use of insulin  Assessment & Plan:  Clarified that Metformin can be taken alongside Ozempic for diabetes management.    Continued Metformin 500 mg daily.  Continued Ozempic 0.25 mg subcutaneously every 7 days (started Monday).    Instructed the patient to check glucose levels daily and bring results in 3 weeks.  Venus to check weight and blood sugars daily, and record readings to bring to next appointment.      3. Severe persistent asthma without complication  Assessment & Plan:  Explained to the patient that asthma is a chronic condition, likely to cause some persistent cough.  Continued Advair, Breo, Flonase, Spiriva, and DuoNeb as previously prescribed.    Avoid/limit triggers such as pollen, dust, mold and animal dander.  Avoid smoke, strong chemicals, and strong cleaning products in addition to strong perfumes/colognes.  Use rescue inhaler when needed, use nebulizer for wheezing or URI.  Report upper respiratory infections early and seek treatment.  Asthma Action Plan reviewed.               Follow up in about 3 weeks (around 3/20/2025) for DM II Follow Up with Star . In addition to their scheduled follow up, the patient has also been instructed to follow up on as needed basis.     This note was generated with the assistance of ambient listening technology. Verbal consent was obtained by the patient and accompanying visitor(s) for the recording of patient appointment to facilitate this note. I attest to having reviewed and edited the generated note for accuracy, though some syntax or spelling errors may persist. Please contact the author of this note for any clarification.      Star Odonnell, Adult-Gerontology NP

## 2025-03-18 ENCOUNTER — CLINICAL SUPPORT (OUTPATIENT)
Dept: DIABETES | Facility: CLINIC | Age: 45
End: 2025-03-18
Payer: MEDICAID

## 2025-03-18 VITALS — HEIGHT: 59 IN | BODY MASS INDEX: 49.39 KG/M2 | WEIGHT: 245 LBS

## 2025-03-18 DIAGNOSIS — E11.42 TYPE 2 DIABETES MELLITUS WITH DIABETIC POLYNEUROPATHY, WITHOUT LONG-TERM CURRENT USE OF INSULIN: Primary | ICD-10-CM

## 2025-03-18 NOTE — PROGRESS NOTES
"Diabetes Care Specialist Virtual Visit Note       The patient location is: in [place], [place]   The chief complaint leading to consultation is: Diabetes  Visit type: audiovisual  Total time spent with patient: 30 min   Each patient to whom he or she provides medical services by telemedicine is:  (1) informed of the relationship between the physician and patient and the respective role of any other health care provider with respect to management of the patient; and (2) notified that he or she may decline to receive medical services by telemedicine and may withdraw from such care at any time.  Diabetes Care Specialist Follow-up Note  Author: Kenyatta Jolley RN  Date: 3/18/2025    Intake    Program Intake  Reason for Diabetes Program Visit:: Intervention  Type of Intervention:: Individual  Individual: Education  Education: Nutrition and Meal Planning  Current diabetes risk level:: low  In the last month, have you used the ER or been admitted to the hospital: Yes  Was the ER or hospital admission related to diabetes?: No  Permission to speak with others about care:: yes    Current Diabetes Treatment: Diet/Exercise, DM Injectables  Oral Medication Type/Dose: metformin Xr 500mg Daily  DM Injectables Type/Dose: Ozempic .25mg every monday    Continuous Glucose Monitoring  Patient has CGM: No    Lab Results   Component Value Date    HGBA1C 5.7 01/17/2025     A1c Pre Diabetes Care Specialist Intervention:  6.1%      Weight: 111.1 kg (245 lb) (on chart and stated)   Height: 4' 11" (149.9 cm)   Body mass index is 49.48 kg/m².  Wt loss of 10 lbs since last visit on 11/21/24    Diabetes Self-Management Skills Assessment    Medication Skills Assessment  Patient is able to identify current diabetes medications, dosages, and appropriate timing of medications.: yes  Patient reports problems or concerns with current medication regimen.: no  Patient is  aware that some diabetes medications can cause low blood sugar?: Yes  Medication " Skills Assessment Completed:: Yes  Assessment indicates:: Adequate understanding  Area of need?: No     Chronic Complications  Reviewed health maintenance: yes  Have you completed your annual diabetes maintenance labwork? : yes  Do you examine your feet daily?: yes  Has your doctor examined your feet?: yes  Do you see a Dentist?: yes  Dentist date of last visit:: Dentures a year ago  Do you see an eye doctor?: yes  Eye doctor date of last visit:: End of last year and got new glasses   has another appt.  Chronic Complications Skills Assessment Completed: : Yes  Assessment indicates:: Knowledge deficit, Instruction Needed  Area of need?: Yes    During today's follow-up visit,  the following areas required further assessment and content was provided/reviewed.    Based on today's diabetes care assessment, the following areas of need were identified:      Identified Areas of Need      Medication/Current Diabetes Treatment: No Discussed MOA, onset, side effects, dosage of Ozempic and metformin   Lifestyle Coping/Support:  No Lives with  good support   Diabetes Disease Process/Treatment Options:   Discussed on prior visit    Nutrition/Healthy Eating:   Yes see care plan    Physical Activity/Exercise:   Yes see care plan    Home Blood Glucose Monitoring:   Yes see care plan    Acute Complications:   Yes Discussed on visit 11/21/25 and reviewed today, instructed to call pcp for lows <70   Chronic Complications: Yes Discussed importance of A1c less than 7 to reduce risk of micro and macro complications, including nephropathy, neuropathy, retinopathy, heart attack and stroke. Reviewed the importance of controlled Blood Pressure and Cholesterol Lab Values in preventing disease.   Health maintenance reviewed       Today's interventions were provided through individual discussion, instruction, and written materials were provided.    Patient verbalized understanding of instruction and written materials.  Pt was able to  return back demonstration of instructions today. Patient understood key points, needs reinforcement and further instruction.     Diabetes Self-Management Care Plan Review and Evaluation of Progress:    During today's follow-up Venus's Diabetes Self-Management Care Plan progress was reviewed and progress was evaluated including his/her input. Venus has agreed to continue his/her journey to improve/maintain overall diabetes control by continuing to set health goals. See care plan progress below.      Care Plan: Diabetes Management   Updates made since 3/18/2024 12:00 AM        Problem: Healthy Eating         Long-Range Goal: Patient agrees to use plate method for meals Completed 3/18/2025   Start Date: 10/21/2024   Expected End Date: 2/17/2025   This Visit's Progress: Met   Priority: High   Barriers: Knowledge deficit   Note:    10/21/2024 Reviewed food exchange list, snack ideas and meal planning tips and sent handouts in patient portal. She was drinking regular soda, sweet tea and has changed to diet recently. Likes veggies  11/21/24 Discussed a few more meals she ate yesterday and she may try cauliflower mash potatoes mixed with potatoes. Tonight making chili she will add broccoli to bowl.  Discussed ways to add more non-starchy vegetables to meals  3/18/25 She did not try cauliflower and potatoes or add veggies to chili. States she likes veggies and eating a few more but just moved and needs to go shopping.          Task: Reviewed the sources and role of Carbohydrate, Protein, and Fat and how each nutrient impacts blood sugar. Completed 11/21/2024        Task: Provided visual examples using dry measuring cups, food models, and other familiar objects such as computer mouse, deck or cards, tennis ball etc. to help with visualization of portions. Completed 10/21/2024        Task: Explained how to count carbohydrates using the food label and the use of dry measuring cups for accurate carb counting. Completed  3/18/2025        Task: Discussed strategies for choosing healthier menu options when dining out. Completed 11/21/2024        Task: Recommended replacing beverages containing high sugar content with noncaloric/sugar free options and/or water. Completed 10/21/2024        Task: Review the importance of balancing carbohydrates with each meal using portion control techniques to count servings of carbohydrate and label reading to identify serving size and amount of total carbs per serving. Completed 10/21/2024        Task: Provided Sample plate method and reviewed the use of the plate to estimate amounts of carbohydrate per meal. Completed 10/21/2024        Problem: Blood Glucose Self-Monitoring         Long-Range Goal: Patient agrees to check and record blood sugars 1-2 times per day. Completed 3/18/2025   Start Date: 10/21/2024   Expected End Date: 11/21/2024   This Visit's Progress: Met   Recent Progress: Met   Priority: Medium   Barriers: Knowledge deficit   Note:    10/21/2024 States just started testing and FBS ranges 130-160 today was 154.    11/21/2024 States BS .  When asked what she thinks the difference is, states she stopped eating sweets. Sherry met.   3/18/25 Testing FBS only  and discussed A1C decrease to 5.7 from 6.1, discussed goal ranges for FBS and when to call md for lows <70 none at present       Task: Reviewed the importance of self-monitoring blood glucose and keeping logs. Completed 10/21/2024        Task: Provided patient with blood glucose logs, reviewed appropriate timing and frequency to SMBG, education on parameters on when to notify provider and advised patient to bring logs to all appts with PCP/Endocrinologist/Diabetes Care Specialist. Completed 10/21/2024        Task: Discussed ways to minimize pain when monitoring blood glucose. Completed 10/21/2024        Problem: Physical Activity and Exercise         Goal: Patient agrees to increase physical activity to a goal of 3  times per  week for 15 minutes.    Start Date: 11/21/2024   Expected End Date: 9/23/2025   This Visit's Progress: Not met   Priority: Low   Barriers: Knowledge deficit; Physical Limitations   Note:    11/21/2024 states has TM and feels she is able to do more walking, discussed taking breaks if needed to split up activity.  Discussed sitting chair and arm exercises and demonstrated some on video visit.   3/18/25 Not able to do TM but  takes short walks, gets sob easy and states she has oxygen at home when needed.  Discussed adding arm exercise again.  She wants to work on this still. Discussed activity 2-3 min intervals with rest in between.        Task: Discussed role of physical activity on reducing insulin resistance and improvement in overall glycemic control. Completed 11/21/2024        Task: Discussed role of physical activity as it relates to weight loss Completed 11/21/2024        Task: Offered suggestions on how patient could increase their regular physical activity Completed 11/21/2024        Task: Reviewed blood glucose monitoring before, during and after exercise/activity Completed 11/21/2024        Problem: Healthy Eating         Long-Range Goal: Pt. agrees to add one starchy veggie per day    Start Date: 3/18/2025   Expected End Date: 9/23/2025   Barriers: Knowledge deficit; Lack of Motivation to Change   Note:    3/18/25 new goal made today, she has stopped regular soda and drinking 1-2 diet sodas per day       Task: Recommended replacing beverages containing high sugar content with noncaloric/sugar free options and/or water. Completed 3/18/2025     Task: Provided Sample plate method and reviewed the use of the plate to estimate amounts of carbohydrate per meal. Completed 3/18/2025          Follow Up Plan     Follow up in about 6 months (around 9/18/2025) for review skills, goals, glucose log . Virtual appt made with pt today for 9/24/25 at 11:00    Today's care plan and follow up schedule was discussed with  patient.  Venus verbalized understanding of the care plan, goals, and agrees to follow up plan.        The patient was encouraged to communicate with his/her health care provider/physician and care team regarding his/her condition(s) and treatment.  I provided the patient with my contact information today and encouraged to contact me via phone or [facility] Patient Portal as needed.     Length of Visit   Total Time: 30 Minutes

## 2025-03-20 ENCOUNTER — LAB VISIT (OUTPATIENT)
Dept: LAB | Facility: HOSPITAL | Age: 45
End: 2025-03-20
Attending: FAMILY MEDICINE
Payer: MEDICAID

## 2025-03-20 ENCOUNTER — RESULTS FOLLOW-UP (OUTPATIENT)
Dept: URGENT CARE | Facility: CLINIC | Age: 45
End: 2025-03-20
Payer: MEDICAID

## 2025-03-20 DIAGNOSIS — E11.65 TYPE 2 DIABETES MELLITUS WITH HYPERGLYCEMIA, WITHOUT LONG-TERM CURRENT USE OF INSULIN: ICD-10-CM

## 2025-03-20 DIAGNOSIS — E11.65 TYPE 2 DIABETES MELLITUS WITH HYPERGLYCEMIA, WITHOUT LONG-TERM CURRENT USE OF INSULIN: Primary | ICD-10-CM

## 2025-03-20 LAB
EST. AVERAGE GLUCOSE BLD GHB EST-MCNC: 114 MG/DL
HBA1C MFR BLD: 5.6 %

## 2025-03-20 PROCEDURE — 83036 HEMOGLOBIN GLYCOSYLATED A1C: CPT

## 2025-03-20 PROCEDURE — 36415 COLL VENOUS BLD VENIPUNCTURE: CPT

## 2025-03-27 DIAGNOSIS — E11.65 TYPE 2 DIABETES MELLITUS WITH HYPERGLYCEMIA: ICD-10-CM

## 2025-03-27 RX ORDER — SEMAGLUTIDE 0.68 MG/ML
0.25 INJECTION, SOLUTION SUBCUTANEOUS WEEKLY
Qty: 3 EACH | Refills: 3 | Status: SHIPPED | OUTPATIENT
Start: 2025-03-27

## 2025-04-01 DIAGNOSIS — J45.909 ASTHMA, UNSPECIFIED ASTHMA SEVERITY, UNSPECIFIED WHETHER COMPLICATED, UNSPECIFIED WHETHER PERSISTENT: ICD-10-CM

## 2025-04-01 RX ORDER — ALBUTEROL SULFATE 90 UG/1
2 INHALANT RESPIRATORY (INHALATION) EVERY 4 HOURS PRN
Qty: 6.7 G | Refills: 11 | Status: SHIPPED | OUTPATIENT
Start: 2025-04-01

## 2025-04-01 RX ORDER — TIOTROPIUM BROMIDE 18 UG/1
CAPSULE ORAL; RESPIRATORY (INHALATION)
Qty: 30 CAPSULE | Refills: 6 | Status: SHIPPED | OUTPATIENT
Start: 2025-04-01

## 2025-04-16 ENCOUNTER — EXTERNAL HOME HEALTH (OUTPATIENT)
Dept: HOME HEALTH SERVICES | Facility: HOSPITAL | Age: 45
End: 2025-04-16
Payer: MEDICAID

## 2025-04-16 ENCOUNTER — HOSPITAL ENCOUNTER (EMERGENCY)
Facility: HOSPITAL | Age: 45
Discharge: HOME | End: 2025-04-16
Attending: STUDENT IN AN ORGANIZED HEALTH CARE EDUCATION/TRAINING PROGRAM
Payer: MEDICAID

## 2025-04-16 VITALS
HEART RATE: 89 BPM | SYSTOLIC BLOOD PRESSURE: 141 MMHG | OXYGEN SATURATION: 95 % | RESPIRATION RATE: 18 BRPM | TEMPERATURE: 98 F | BODY MASS INDEX: 47.58 KG/M2 | DIASTOLIC BLOOD PRESSURE: 79 MMHG | HEIGHT: 59 IN | WEIGHT: 236 LBS

## 2025-04-16 DIAGNOSIS — H11.31 SUBCONJUNCTIVAL HEMORRHAGE OF RIGHT EYE: Primary | ICD-10-CM

## 2025-04-16 PROCEDURE — 25000003 PHARM REV CODE 250: Performed by: STUDENT IN AN ORGANIZED HEALTH CARE EDUCATION/TRAINING PROGRAM

## 2025-04-16 PROCEDURE — 99283 EMERGENCY DEPT VISIT LOW MDM: CPT

## 2025-04-16 RX ORDER — TETRACAINE HYDROCHLORIDE 5 MG/ML
2 SOLUTION OPHTHALMIC
Status: COMPLETED | OUTPATIENT
Start: 2025-04-16 | End: 2025-04-16

## 2025-04-16 RX ORDER — ERYTHROMYCIN 5 MG/G
OINTMENT OPHTHALMIC EVERY 4 HOURS
Qty: 3.5 G | Refills: 0 | Status: SHIPPED | OUTPATIENT
Start: 2025-04-16 | End: 2025-04-21

## 2025-04-16 RX ADMIN — TETRACAINE HYDROCHLORIDE 2 DROP: 5 SOLUTION OPHTHALMIC at 08:04

## 2025-04-16 RX ADMIN — FLUORESCEIN SODIUM 1 EACH: 1 STRIP OPHTHALMIC at 08:04

## 2025-04-17 NOTE — ED PROVIDER NOTES
"Encounter Date: 2025       History     Chief Complaint   Patient presents with    Eye Problem     Right eye redness, swelling to eye orbit, redness to sclera.  Vision 20/50 with blurred vision.  Yellow pink drainage reported.       Patient is a 44-year-old white female with a history of asthma who presented to the ER today due to right eye irritation.  She states yesterday she is unaware to her PCP's office while she was coughing due to her chronic asthma.  She states she "felt like I got something in it".  She states that she has been rubbing it incessantly and applying eyedrops to it without much relief.  She states her vision is largely unaffected.  She denies any pain.  She states this has irritated and feels as though "something is in it. "Denies any other complaints.      Review of patient's allergies indicates:  No Known Allergies  Past Medical History:   Diagnosis Date    Asthma     Chronic sinusitis, unspecified     Diabetes mellitus [id number]    Dietary iron deficiency     Essential (primary) hypertension     Eustachian tube dysfunction, bilateral     DOMINIQUE (iron deficiency anemia)     Leukocytosis     Morbid obesity     Nasal congestion     Nasal polyps     Obesity hypoventilation syndrome     Obstructive sleep apnea     JOSEPH on CPAP     Prediabetes      Past Surgical History:   Procedure Laterality Date     SECTION      CHOLECYSTECTOMY      COLONOSCOPY  07/15/2021    Dr Ricky Hale    ESOPHAGOGASTRODUODENOSCOPY  2021    Dr Ricky Hale    FESS, WITH DACRYOCYSTORHINOSTOMY      NASAL TURBINATE REDUCTION      SINUS SURGERY       Family History   Problem Relation Name Age of Onset    Diabetes Mother Chitra henriquez     COPD Mother Chitra henriquez     Diabetes Maternal Grandmother Francia sharpemiliano      Social History[1]  Review of Systems   Constitutional:  Negative for chills, fatigue and fever.   HENT:  Negative for congestion, sore throat and trouble swallowing.    Eyes:  Positive for redness and " itching. Negative for pain, discharge and visual disturbance.   Respiratory:  Negative for cough, shortness of breath and wheezing.    Cardiovascular:  Negative for chest pain and palpitations.   Gastrointestinal:  Negative for abdominal pain, blood in stool, constipation, diarrhea, nausea and vomiting.   Genitourinary:  Negative for dysuria and hematuria.   Musculoskeletal:  Negative for back pain and myalgias.   Skin:  Negative for rash and wound.   Neurological:  Negative for seizures, syncope and headaches.   Psychiatric/Behavioral:  Negative for confusion. The patient is not nervous/anxious.        Physical Exam     Initial Vitals [04/16/25 1948]   BP Pulse Resp Temp SpO2   (!) 143/89 99 18 98.1 °F (36.7 °C) (!) 93 %      MAP       --         Physical Exam    Nursing note and vitals reviewed.  Constitutional: She appears well-developed and well-nourished. She is not diaphoretic. No distress.   HENT:   Head: Normocephalic.   Right Ear: External ear normal.   Left Ear: External ear normal.   Nose: Nose normal.   Right eye:  Blood noted underlying the sclera but not crossing over the iris.  No evidence of hyphema.  All findings on exam seem most consistent with subconjunctival hemorrhage.  No periorbital swelling.  No proptosis.  No pain with extraocular muscle movement.   Eyes: Conjunctivae and EOM are normal. Right eye exhibits no discharge. Left eye exhibits no discharge. No scleral icterus.   Neck:   Normal range of motion.  Cardiovascular:  Normal rate, regular rhythm and normal heart sounds.     Exam reveals no gallop and no friction rub.       No murmur heard.  Pulmonary/Chest: Breath sounds normal. No stridor. No respiratory distress. She has no wheezes. She has no rhonchi. She has no rales.   Musculoskeletal:         General: Normal range of motion.      Cervical back: Normal range of motion.     Neurological: She is alert.   Skin: Skin is warm. No rash noted. No erythema.   Psychiatric: She has a normal  mood and affect. Her behavior is normal.         ED Course   Procedures  Labs Reviewed - No data to display       Imaging Results    None          Medications   TETRAcaine HCl (PF) 0.5 % Drop 2 drop (2 drops Right Eye Given 4/16/25 2000)   fluorescein ophthalmic strip 1 each (1 each Right Eye Given 4/16/25 2000)     Medical Decision Making  Differentials: Subconjunctival hemorrhage, foreign body, open globe, preseptal/orbital cellulitis, corneal abrasion, conjunctivitis   History is provided by the patient  44-year-old well-appearing female presents to ER today wellness to the right eye.  Findings on physical exam most consistent with subconjunctival hemorrhage.  Low suspicion for preseptal/orbital cellulitis.  Tetracaine completely resolved symptoms consistent with an outer eye injury.  Clinical abrasion not visualized on fluorescein exam.  For symptomatic relief I did advise cool compresses and these were demonstrated.  Erythromycin ointment sent pharmacy for extra lubrication and symptomatic relief.  All questions answered in layman's terms, ER return precautions and close follow up with the PCP was recommended.    Risk  Prescription drug management.                                      Clinical Impression:  Final diagnoses:  [H11.31] Subconjunctival hemorrhage of right eye (Primary)          ED Disposition Condition    Discharge Good          ED Prescriptions       Medication Sig Dispense Start Date End Date Auth. Provider    erythromycin (ROMYCIN) ophthalmic ointment Place into the right eye every 4 (four) hours. Place a 1/2 inch ribbon of ointment into the lower eyelid. for 5 days 3.5 g 4/16/2025 4/21/2025 Ricky Rosario MD          Follow-up Information       Follow up With Specialties Details Why Contact Info    [facility] - Emergency Dept Emergency Medicine  If symptoms worsen [address] [place] [address] [id number] [phone]    Sebastian Davila,  Family Medicine Schedule an appointment  as soon as possible for a visit   [address] [facility] [address]  [phone]                   [1]   Social History  Tobacco Use    Smoking status: Never    Smokeless tobacco: Never   Substance Use Topics    Alcohol use: Never    Drug use: Never        Ricky Rosario MD  04/16/25 2013

## 2025-04-19 DIAGNOSIS — E61.1 DIETARY IRON DEFICIENCY: ICD-10-CM

## 2025-04-21 DIAGNOSIS — E11.42 TYPE 2 DIABETES MELLITUS WITH DIABETIC POLYNEUROPATHY, WITHOUT LONG-TERM CURRENT USE OF INSULIN: ICD-10-CM

## 2025-04-21 DIAGNOSIS — G62.9 NEUROPATHY: ICD-10-CM

## 2025-04-21 RX ORDER — GABAPENTIN 300 MG/1
300 CAPSULE ORAL 3 TIMES DAILY
Qty: 270 CAPSULE | Refills: 1 | Status: SHIPPED | OUTPATIENT
Start: 2025-04-21 | End: 2025-10-18

## 2025-04-21 RX ORDER — FERROUS GLUCONATE 324(38)MG
1 TABLET ORAL 2 TIMES DAILY
Qty: 60 TABLET | Refills: 5 | Status: SHIPPED | OUTPATIENT
Start: 2025-04-21

## 2025-04-22 RX ORDER — LANCETS
EACH MISCELLANEOUS
Qty: 120 EACH | Refills: 11 | Status: SHIPPED | OUTPATIENT
Start: 2025-04-22

## 2025-05-09 DIAGNOSIS — J32.9 CHRONIC SINUSITIS: Primary | ICD-10-CM

## 2025-05-21 NOTE — PROGRESS NOTES
[facility]  Otolaryngology Clinic Note    Mellissa Ann Milligan  YOB: 1980    Chief Complaint:   Chief Complaint   Patient presents with    referral: Hearing Loss        HPI: 2025: 44 y.o. female referred for HL. States this began in October, that her ears feel stopped up. There was initially some fluctuation but states it has become more constant. She has had similar symptoms in the past that resolved. Endorses occasional tinnitus. Denies any previous otologic hx. She was sick around the time symptoms began and states she does not feel that it has ever fully resolved, that she still has congestion on her chest. She has been on 2 rounds of abx without improvement in otologic symptoms. Hx of loud noise exposure around her stepdad who was a  and from listening to loud music, however, felt she was hearing well before October. Endorses chronic nasal congestion and rhinitis for which she takes an OTC antihistamine & uses flonase once a day. Hx of JOSEPH for which she wears CPAP.    2025: Hearing has improved. Pt was placed on doxycycline by Dr. Armstrong in April for sinus dz seen on XR. She states this helped improve her cough significantly, but it has not completely resolved. Denies nasal congestion, rhinitis, or PND. Reports she forgot to try sinus rinses.     ROS:   10-point review of systems negative except per HPI      Review of patient's allergies indicates:  No Known Allergies    Past Medical History:   Diagnosis Date    Asthma     Chronic sinusitis, unspecified     Diabetes mellitus [id number]    Dietary iron deficiency     Essential (primary) hypertension     Eustachian tube dysfunction, bilateral     DOMINIQUE (iron deficiency anemia)     Leukocytosis     Morbid obesity     Nasal congestion     Nasal polyps     Obesity hypoventilation syndrome     Obstructive sleep apnea     JOSEPH on CPAP     Prediabetes        Past Surgical History:   Procedure Laterality Date      SECTION      CHOLECYSTECTOMY      COLONOSCOPY  07/15/2021    Dr Ricky Hale    ESOPHAGOGASTRODUODENOSCOPY  09/09/2021    Dr Ricky Hale    FESS, WITH DACRYOCYSTORHINOSTOMY      NASAL TURBINATE REDUCTION      SINUS SURGERY         [Social History]    [Social History]  Socioeconomic History    Marital status:    Tobacco Use    Smoking status: Never    Smokeless tobacco: Never   Substance and Sexual Activity    Alcohol use: Never    Drug use: Never    Sexual activity: Yes     Partners: Male     Birth control/protection: None     Social Drivers of Health     Financial Resource Strain: Low Risk  (1/10/2025)    Overall Financial Resource Strain (CARDIA)     Difficulty of Paying Living Expenses: Not hard at all   Food Insecurity: No Food Insecurity (1/10/2025)    Hunger Vital Sign     Worried About Running Out of Food in the Last Year: Never true     Ran Out of Food in the Last Year: Never true   Transportation Needs: No Transportation Needs (10/13/2023)    PRAPARE - Transportation     Lack of Transportation (Medical): No     Lack of Transportation (Non-Medical): No   Physical Activity: Unknown (1/10/2025)    Exercise Vital Sign     Days of Exercise per Week: Patient declined     Minutes of Exercise per Session: 20 min   Stress: No Stress Concern Present (1/10/2025)    [employer] - Occupational Stress Questionnaire     Feeling of Stress : Not at all   Housing Stability: Low Risk  (10/13/2023)    Housing Stability Vital Sign     Unable to Pay for Housing in the Last Year: No     Number of Places Lived in the Last Year: 1     Unstable Housing in the Last Year: No       Family History   Problem Relation Name Age of Onset    Diabetes Mother Chitra henriquez     COPD Mother Chitra swartztoy     Diabetes Maternal Grandmother Francia sharpemiliano        [Encounter Medications]    [Encounter Medications]  Outpatient Encounter Medications as of 2/24/2025   Medication Sig Dispense Refill    albuterol  (PROVENTIL/VENTOLIN HFA) 90 mcg/actuation inhaler INHALE 2 PUFFS BY MOUTH EVERY 4 HOURS AS NEEDED FOR WHEEZE OR FOR SHORTNESS OF BREATH 6.7 g 11    albuterol-ipratropium (DUO-NEB) 2.5 mg-0.5 mg/3 mL nebulizer solution INHALE 1 VIAL VIA NEBULIZER FOUR TIMES A  mL 5    atorvastatin (LIPITOR) 10 MG tablet Take 1 tablet (10 mg total) by mouth once daily. 30 tablet 11    blood sugar diagnostic Strp To check BG twice daily, to use with insurance preferred meter 120 each 11    blood-glucose meter kit To check BG daily, to use with insurance preferred meter 1 each 0    budesonide-formoterol 160-4.5 mcg (BREYNA) 160-4.5 mcg/actuation HFAA TAKE 2 PUFFS BY MOUTH TWICE A DAY 10.3 g 5    ferrous gluconate (FERGON) 324 MG tablet TAKE 1 TABLET BY MOUTH TWICE A DAY 60 tablet 5    fluticasone propionate (FLONASE) 50 mcg/actuation nasal spray SPRAY 2 SPRAYS INTO EACH NOSTRIL ONCE DAILY. 48 mL 2    gabapentin (NEURONTIN) 300 MG capsule Take 1 capsule (300 mg total) by mouth 3 (three) times daily. 90 capsule 5    lancets Misc To check BG daily, to use with insurance preferred meter 120 each 11    losartan (COZAAR) 50 MG tablet TAKE 1 TABLET BY MOUTH EVERY DAY 90 tablet 1    OZEMPIC 0.25 mg or 0.5 mg (2 mg/3 mL) pen injector       tiotropium (SPIRIVA WITH HANDIHALER) 18 mcg inhalation capsule INHALE 2 INHALATIONS OF ONE CAPSULE ONCE DAILY 30 capsule 6    azithromycin (Z-BARNDEN) 250 MG tablet Take 2 tablets by mouth on day 1; Take 1 tablet by mouth on days 2-5 (Patient not taking: Reported on 2/24/2025) 6 tablet 0    dulaglutide (TRULICITY) 0.75 mg/0.5 mL pen injector Inject 0.75 mg into the skin every 7 days. (Patient not taking: Reported on 2/24/2025) 4 pen 0    metFORMIN (GLUCOPHAGE-XR) 500 MG ER 24hr tablet Take 1 tablet (500 mg total) by mouth daily with breakfast. (Patient not taking: Reported on 2/24/2025) 90 tablet 3    pantoprazole (PROTONIX) 40 MG tablet Take 1 tablet (40 mg total) by mouth once daily. (Patient not taking:  Reported on 2/24/2025) 30 tablet 11    predniSONE (DELTASONE) 20 MG tablet One tablet orally twice daily for 3 days and then once daily for 2 days (Patient not taking: Reported on 2/24/2025) 8 tablet 0    [DISCONTINUED] albuterol-ipratropium (DUO-NEB) 2.5 mg-0.5 mg/3 mL nebulizer solution NEBULIZE 1 VIAL 4 TIMES DAILY 180 mL 5    [DISCONTINUED] budesonide-formoterol 160-4.5 mcg (SYMBICORT) 160-4.5 mcg/actuation HFAA TAKE 2 PUFFS BY MOUTH TWICE A DAY 10.2 g 5    [DISCONTINUED] semaglutide (OZEMPIC) 0.25 mg or 0.5 mg (2 mg/3 mL) pen injector Inject 0.25 mg into the skin every 7 days. (Patient not taking: Reported on 2/7/2025) 3 mL 0    [DISCONTINUED] tirzepatide (MOUNJARO) 2.5 mg/0.5 mL PnIj Inject 2.5 mg into the skin every 7 days. 2 mL 0     No facility-administered encounter medications on file as of 2/24/2025.       Physical Exam:  Vitals:    02/24/25 0932   BP: 111/72   BP Location: Right arm   Patient Position: Sitting   Pulse: 96   Temp: 98 °F (36.7 °C)   TempSrc: Oral       Physical Exam   General: NAD, voice normal  Neuro: AAO, CN II - XII grossly intact  Head/ Face: NCAT, symmetric, sensations intact bilaterally  Eyes: EOMI, PERRL  Ears: externally normal with grossly normal hearing. Ho lateralizes left. AC > BC b/l  *exam under microscopy  AD: EAC patent, TM intact, no middle ear effusion, no retraction  AS: EAC patent, TM intact, no middle ear effusion, no retraction  Nose: bilateral nares patent, midline septum, no rhinorrhea, no external deformity, mild ITH  OC/OP: MMM, no intraoral lesions, no trismus, dentition is moderate, no uvular deviation, bilaterally symmetric soft palate elevation, palatoglossus and palatopharyngeal fold wnl; tonsils are symmetric and 1+  Indirect laryngoscopy: deferred due to patient intolerance  Neck: soft, supple, no LAD, normal ROM, no thyromegaly  Respiratory: nonlabored, no wheezing, bilateral chest rise  Cardiovascular: RRR  Gastrointestinal: S NT ND  Skin: warm, no  lesions  Musculoskeletal: 5/5 strength  Psych: Appropriate affect/mood     Pertinent Data:  ? LABS:  ? AUDIO:             ? PATH:      Imaging:           Assessment/Plan:  44 y.o. female with AR, ETD, subjective HL. Audio with mild SNHL, type As tymp on right & A on left- reviewed. Mucosal thickening on sinus XR 4/2025. States symptoms improved following doxycycline. Offered CT sinus but pt elects to try sinus rinses first & monitor.  - NSI BID  - Flonase BID  - RTC 3mo. Will consider CT sinus if cough has not resolved or nasal symptoms worsen.    Adriana Hargrove NP

## 2025-05-22 ENCOUNTER — CLINICAL SUPPORT (OUTPATIENT)
Dept: AUDIOLOGY | Facility: HOSPITAL | Age: 45
End: 2025-05-22
Payer: MEDICAID

## 2025-05-22 ENCOUNTER — OFFICE VISIT (OUTPATIENT)
Dept: OTOLARYNGOLOGY | Facility: CLINIC | Age: 45
End: 2025-05-22
Payer: MEDICAID

## 2025-05-22 VITALS — SYSTOLIC BLOOD PRESSURE: 114 MMHG | DIASTOLIC BLOOD PRESSURE: 78 MMHG | HEART RATE: 92 BPM | TEMPERATURE: 98 F

## 2025-05-22 DIAGNOSIS — R09.81 NASAL CONGESTION: ICD-10-CM

## 2025-05-22 DIAGNOSIS — H69.93 ETD (EUSTACHIAN TUBE DYSFUNCTION), BILATERAL: ICD-10-CM

## 2025-05-22 DIAGNOSIS — H91.93 DECREASED HEARING OF BOTH EARS: Primary | ICD-10-CM

## 2025-05-22 DIAGNOSIS — J32.9 CHRONIC SINUSITIS: ICD-10-CM

## 2025-05-22 DIAGNOSIS — H90.3 SENSORINEURAL HEARING LOSS, BILATERAL: Primary | ICD-10-CM

## 2025-05-22 DIAGNOSIS — H91.93 DECREASED HEARING OF BOTH EARS: ICD-10-CM

## 2025-05-22 PROCEDURE — 1159F MED LIST DOCD IN RCRD: CPT | Mod: CPTII,,, | Performed by: NURSE PRACTITIONER

## 2025-05-22 PROCEDURE — 99213 OFFICE O/P EST LOW 20 MIN: CPT | Mod: PBBFAC,25 | Performed by: NURSE PRACTITIONER

## 2025-05-22 PROCEDURE — 3078F DIAST BP <80 MM HG: CPT | Mod: CPTII,,, | Performed by: NURSE PRACTITIONER

## 2025-05-22 PROCEDURE — 4010F ACE/ARB THERAPY RXD/TAKEN: CPT | Mod: CPTII,,, | Performed by: NURSE PRACTITIONER

## 2025-05-22 PROCEDURE — 3066F NEPHROPATHY DOC TX: CPT | Mod: CPTII,,, | Performed by: NURSE PRACTITIONER

## 2025-05-22 PROCEDURE — 99213 OFFICE O/P EST LOW 20 MIN: CPT | Mod: S$PBB,,, | Performed by: NURSE PRACTITIONER

## 2025-05-22 PROCEDURE — 3061F NEG MICROALBUMINURIA REV: CPT | Mod: CPTII,,, | Performed by: NURSE PRACTITIONER

## 2025-05-22 PROCEDURE — 92557 COMPREHENSIVE HEARING TEST: CPT | Performed by: AUDIOLOGIST

## 2025-05-22 PROCEDURE — 3044F HG A1C LEVEL LT 7.0%: CPT | Mod: CPTII,,, | Performed by: NURSE PRACTITIONER

## 2025-05-22 PROCEDURE — 3074F SYST BP LT 130 MM HG: CPT | Mod: CPTII,,, | Performed by: NURSE PRACTITIONER

## 2025-05-22 PROCEDURE — 92567 TYMPANOMETRY: CPT | Performed by: AUDIOLOGIST

## 2025-05-22 RX ORDER — METFORMIN HYDROCHLORIDE 500 MG/1
500 TABLET, EXTENDED RELEASE ORAL
COMMUNITY
Start: 2025-05-16

## 2025-05-22 NOTE — PROGRESS NOTES
Hearing Evaluation      Patient History: Mrs. Milligan is in for a hearing evaluation reporting a subjective hearing loss, onset unknown. Aural fullness that she was experiences has now subsided. She denies tinnitus and attributes vertiginous symptoms to diabetes. All additional history is unremarkable.      Test Results:       Pure Tone Testing:     Right ear:   Mild sensorineural hearing loss from 2k-3kHz. Speech reception threshold is in agreement with puretone findings.  Discrimination score of 100% is considered excellent.      Left ear: Mild sensorineural hearing loss from 2k-3kHz. Speech reception threshold is in agreement with puretone findings.  Discrimination score of 100% is considered excellent.         Tympanometry:        Right ear:   Type 'As' tymp, reduced (.19mL) compliance    Left ear: Type 'A' tymp, normal middle ear pressure/function       Distortion Product Otoacoustic Emission Testing (DPOAE):         Right ear: Present emissions from 4k-6kHz      Left ear: Present emissions from 3k-6kHz         Interpretations:     Behavioral test findings indicate a mild sensorineural hearing loss, bilaterally. Speech reception thresholds obtained at 30dB, AU, and are in agreement with puretone findings. Speech discrimination scores of 100%, AU, are considered excellent.  DPOAE findings correlate to puretone findings. Immittance measures indicate reduced TM mobility for the right ear and  normal middle ear pressure/function for the left ear.         Recommendations:    Continue with ENT follow up  RTC per ENT

## 2025-05-27 LAB
LEFT EYE DM RETINOPATHY: POSITIVE
RIGHT EYE DM RETINOPATHY: POSITIVE

## 2025-06-12 ENCOUNTER — PATIENT OUTREACH (OUTPATIENT)
Dept: ADMINISTRATIVE | Facility: HOSPITAL | Age: 45
End: 2025-06-12
Payer: MEDICAID

## 2025-06-12 DIAGNOSIS — G62.9 NEUROPATHY: ICD-10-CM

## 2025-06-12 RX ORDER — GABAPENTIN 300 MG/1
300 CAPSULE ORAL 3 TIMES DAILY
Qty: 270 CAPSULE | Refills: 1 | Status: SHIPPED | OUTPATIENT
Start: 2025-06-12 | End: 2025-12-09

## 2025-06-23 DIAGNOSIS — E11.65 TYPE 2 DIABETES MELLITUS WITH HYPERGLYCEMIA: ICD-10-CM

## 2025-06-23 RX ORDER — SEMAGLUTIDE 0.68 MG/ML
0.25 INJECTION, SOLUTION SUBCUTANEOUS WEEKLY
Qty: 3 EACH | Refills: 3 | Status: SHIPPED | OUTPATIENT
Start: 2025-06-23

## 2025-07-17 ENCOUNTER — LAB VISIT (OUTPATIENT)
Dept: LAB | Facility: HOSPITAL | Age: 45
End: 2025-07-17
Attending: FAMILY MEDICINE
Payer: MEDICAID

## 2025-07-17 DIAGNOSIS — E11.65 TYPE 2 DIABETES MELLITUS WITH HYPERGLYCEMIA, WITHOUT LONG-TERM CURRENT USE OF INSULIN: ICD-10-CM

## 2025-07-17 LAB
ALBUMIN SERPL-MCNC: 3.6 G/DL (ref 3.5–5)
ALBUMIN/GLOB SERPL: 0.9 RATIO (ref 1.1–2)
ALP SERPL-CCNC: 89 UNIT/L (ref 40–150)
ALT SERPL-CCNC: 17 UNIT/L (ref 0–55)
ANION GAP SERPL CALC-SCNC: 8 MEQ/L
AST SERPL-CCNC: 16 UNIT/L (ref 11–45)
BILIRUB SERPL-MCNC: 0.5 MG/DL
BUN SERPL-MCNC: 11 MG/DL (ref 7–18.7)
CALCIUM SERPL-MCNC: 9.6 MG/DL (ref 8.4–10.2)
CHLORIDE SERPL-SCNC: 106 MMOL/L (ref 98–107)
CO2 SERPL-SCNC: 30 MMOL/L (ref 22–29)
CREAT SERPL-MCNC: 0.67 MG/DL (ref 0.55–1.02)
CREAT/UREA NIT SERPL: 16
EST. AVERAGE GLUCOSE BLD GHB EST-MCNC: 99.7 MG/DL
GFR SERPLBLD CREATININE-BSD FMLA CKD-EPI: >60 ML/MIN/1.73/M2
GLOBULIN SER-MCNC: 3.8 GM/DL (ref 2.4–3.5)
GLUCOSE SERPL-MCNC: 93 MG/DL (ref 74–100)
HBA1C MFR BLD: 5.1 %
POTASSIUM SERPL-SCNC: 3.8 MMOL/L (ref 3.5–5.1)
PROT SERPL-MCNC: 7.4 GM/DL (ref 6.4–8.3)
SODIUM SERPL-SCNC: 144 MMOL/L (ref 136–145)

## 2025-07-17 PROCEDURE — 80053 COMPREHEN METABOLIC PANEL: CPT

## 2025-07-17 PROCEDURE — 36415 COLL VENOUS BLD VENIPUNCTURE: CPT

## 2025-07-17 PROCEDURE — 83036 HEMOGLOBIN GLYCOSYLATED A1C: CPT

## 2025-07-22 ENCOUNTER — OFFICE VISIT (OUTPATIENT)
Dept: FAMILY MEDICINE | Facility: CLINIC | Age: 45
End: 2025-07-22
Payer: MEDICAID

## 2025-07-22 VITALS
SYSTOLIC BLOOD PRESSURE: 104 MMHG | OXYGEN SATURATION: 98 % | TEMPERATURE: 98 F | BODY MASS INDEX: 45.2 KG/M2 | HEART RATE: 84 BPM | WEIGHT: 224.19 LBS | HEIGHT: 59 IN | DIASTOLIC BLOOD PRESSURE: 73 MMHG | RESPIRATION RATE: 20 BRPM

## 2025-07-22 DIAGNOSIS — I10 PRIMARY HYPERTENSION: Chronic | ICD-10-CM

## 2025-07-22 DIAGNOSIS — E11.65 TYPE 2 DIABETES MELLITUS WITH HYPERGLYCEMIA, WITHOUT LONG-TERM CURRENT USE OF INSULIN: Primary | ICD-10-CM

## 2025-07-22 PROBLEM — L03.311 CELLULITIS, ABDOMINAL WALL: Status: RESOLVED | Noted: 2025-02-27 | Resolved: 2025-07-22

## 2025-07-22 PROBLEM — J45.31 MILD PERSISTENT ASTHMA WITH EXACERBATION: Status: RESOLVED | Noted: 2025-02-11 | Resolved: 2025-07-22

## 2025-07-22 PROCEDURE — 3074F SYST BP LT 130 MM HG: CPT | Mod: CPTII,,, | Performed by: FAMILY MEDICINE

## 2025-07-22 PROCEDURE — 3066F NEPHROPATHY DOC TX: CPT | Mod: CPTII,,, | Performed by: FAMILY MEDICINE

## 2025-07-22 PROCEDURE — 4010F ACE/ARB THERAPY RXD/TAKEN: CPT | Mod: CPTII,,, | Performed by: FAMILY MEDICINE

## 2025-07-22 PROCEDURE — 3061F NEG MICROALBUMINURIA REV: CPT | Mod: CPTII,,, | Performed by: FAMILY MEDICINE

## 2025-07-22 PROCEDURE — 1160F RVW MEDS BY RX/DR IN RCRD: CPT | Mod: CPTII,,, | Performed by: FAMILY MEDICINE

## 2025-07-22 PROCEDURE — 3044F HG A1C LEVEL LT 7.0%: CPT | Mod: CPTII,,, | Performed by: FAMILY MEDICINE

## 2025-07-22 PROCEDURE — 3008F BODY MASS INDEX DOCD: CPT | Mod: CPTII,,, | Performed by: FAMILY MEDICINE

## 2025-07-22 PROCEDURE — 1159F MED LIST DOCD IN RCRD: CPT | Mod: CPTII,,, | Performed by: FAMILY MEDICINE

## 2025-07-22 PROCEDURE — 99214 OFFICE O/P EST MOD 30 MIN: CPT | Mod: ,,, | Performed by: FAMILY MEDICINE

## 2025-07-22 PROCEDURE — 3078F DIAST BP <80 MM HG: CPT | Mod: CPTII,,, | Performed by: FAMILY MEDICINE

## 2025-07-22 NOTE — PROGRESS NOTES
Patient ID: [id number]     Chief Complaint: Diabetes (6 mnt f/u)    HPI:     Mellissa Ann Milligan is a 44 y.o. female here today for Diabetes (6 mnt f/u). Reviewed labs with patient. A1C of 5.1.   History of Present Illness               -------------------------------------    Asthma    Chronic sinusitis, unspecified    Diabetes mellitus    Dietary iron deficiency    Essential (primary) hypertension    Eustachian tube dysfunction, bilateral    DOMINIQUE (iron deficiency anemia)    Leukocytosis    Mild nonproliferative diabetic retinopathy    Morbid obesity    Nasal congestion    Nasal polyps    Obesity hypoventilation syndrome    Obstructive sleep apnea    JOSEPH on CPAP    Prediabetes        Past Surgical History:   Procedure Laterality Date     SECTION      CHOLECYSTECTOMY      COLONOSCOPY  07/15/2021    Dr Ricky Hale    ESOPHAGOGASTRODUODENOSCOPY  2021    Dr Ricky Hale    FESS, WITH DACRYOCYSTORHINOSTOMY      NASAL TURBINATE REDUCTION      SINUS SURGERY         Review of patient's allergies indicates:  No Known Allergies    Outpatient Medications Marked as Taking for the 25 encounter (Office Visit) with Sebastian Davila, DO   Medication Sig Dispense Refill    albuterol (PROVENTIL/VENTOLIN HFA) 90 mcg/actuation inhaler Inhale 2 puffs into the lungs every 4 (four) hours as needed for Wheezing. Rescue 6.7 g 11    albuterol-ipratropium (DUO-NEB) 2.5 mg-0.5 mg/3 mL nebulizer solution INHALE 1 VIAL VIA NEBULIZER FOUR TIMES A  mL 5    atorvastatin (LIPITOR) 10 MG tablet Take 1 tablet (10 mg total) by mouth once daily. 30 tablet 11    blood sugar diagnostic Strp To check BG twice daily, to use with insurance preferred meter 120 each 11    blood-glucose meter kit To check BG daily, to use with insurance preferred meter 1 each 0    budesonide-formoterol 160-4.5 mcg (BREYNA) 160-4.5 mcg/actuation HFAA TAKE 2 PUFFS BY MOUTH TWICE A DAY 10.3 g 5    ferrous gluconate (FERGON) 324 MG tablet TAKE 1 TABLET BY  "MOUTH TWICE A DAY 60 tablet 5    fluticasone propionate (FLONASE) 50 mcg/actuation nasal spray 2 sprays (100 mcg total) by Each Nostril route 2 (two) times a day. 48 g 3    gabapentin (NEURONTIN) 300 MG capsule Take 1 capsule (300 mg total) by mouth 3 (three) times daily. 270 capsule 1    lancets Misc To check BG daily, to use with insurance preferred meter 120 each 11    losartan (COZAAR) 50 MG tablet TAKE 1 TABLET BY MOUTH EVERY DAY 90 tablet 1    metFORMIN (GLUCOPHAGE-XR) 500 MG ER 24hr tablet Take 500 mg by mouth.      semaglutide (OZEMPIC) 0.25 mg or 0.5 mg (2 mg/3 mL) pen injector Inject 0.25 mg into the skin once a week. 3 each 3    tiotropium (SPIRIVA) 18 mcg inhalation capsule INHALE 2 INHALATIONS OF ONE CAPSULE ONCE DAILY 30 capsule 6       Social History[1]     Family History   Problem Relation Name Age of Onset    Diabetes Mother Chitra henriquez     COPD Mother Chitra henriquez     Diabetes Maternal Grandmother Francia henriquez         Patient Care Team:  Sebastian Davila DO as PCP - General (Family Medicine)  Thee Raymond MD as Consulting Physician (Otolaryngology)  Ricky Hale MD as Consulting Physician (Gastroenterology)  Marco Armstrong MD as Consulting Physician (Pulmonary Disease)  Kenyatta Jolley, RN as Diabetes Educator (Diabetes)     Subjective:     ROS    See HPI for details  All Other ROS: Negative except as stated in HPI.       Objective:     /73 (BP Location: Left arm, Patient Position: Sitting)   Pulse 84   Temp 98 °F (36.7 °C) (Oral)   Resp 20   Ht 4' 11" (1.499 m)   Wt 101.7 kg (224 lb 3.2 oz)   LMP 03/25/2025 (Approximate)   SpO2 98%   BMI 45.28 kg/m²     Physical Exam  Vitals reviewed.   Constitutional:       General: She is not in acute distress.     Appearance: Normal appearance. She is obese.   Cardiovascular:      Rate and Rhythm: Normal rate and regular rhythm.      Heart sounds: No murmur heard.     No friction rub. No gallop.   Pulmonary:      Effort: " No respiratory distress.      Breath sounds: No wheezing, rhonchi or rales.   Musculoskeletal:         General: No swelling, tenderness or deformity.      Right lower leg: No edema.      Left lower leg: No edema.   Skin:     General: Skin is warm and dry.      Findings: No lesion or rash.   Neurological:      General: No focal deficit present.      Mental Status: She is alert.   Psychiatric:         Mood and Affect: Mood normal.         Assessment/Plan:     1. Type 2 diabetes mellitus with hyperglycemia, without long-term current use of insulin  -well controlled on metformin 500mg ER daily and ozempic 0.25mg q7 days.   2. Primary hypertension      Well controlled on losartan 50mg daily     Assessment & Plan               This note was generated with the assistance of ambient listening technology. Verbal consent was obtained by the patient and accompanying visitor(s) for the recording of patient appointment to facilitate this note. I attest to having reviewed and edited the generated note for accuracy, though some syntax or spelling errors may persist. Please contact the author of this note for any clarification.     Follow up:     No follow-ups on file. In addition to their scheduled follow up, the patient has also been instructed to follow up on as needed basis.          [1]   Social History  Socioeconomic History    Marital status:    Tobacco Use    Smoking status: Never    Smokeless tobacco: Never   Substance and Sexual Activity    Alcohol use: Never    Drug use: Never    Sexual activity: Yes     Partners: Male     Birth control/protection: None     Social Drivers of Health     Financial Resource Strain: Low Risk  (7/21/2025)    Overall Financial Resource Strain (CARDIA)     Difficulty of Paying Living Expenses: Not hard at all   Food Insecurity: No Food Insecurity (7/21/2025)    Hunger Vital Sign     Worried About Running Out of Food in the Last Year: Never true     Ran Out of Food in the Last Year: Never  true   Transportation Needs: No Transportation Needs (7/21/2025)    PRAPARE - Transportation     Lack of Transportation (Medical): No     Lack of Transportation (Non-Medical): No   Physical Activity: Insufficiently Active (7/21/2025)    Exercise Vital Sign     Days of Exercise per Week: 4 days     Minutes of Exercise per Session: 10 min   Stress: No Stress Concern Present (7/21/2025)    [employer] - Occupational Stress Questionnaire     Feeling of Stress : Not at all   Housing Stability: Low Risk  (7/21/2025)    Housing Stability Vital Sign     Unable to Pay for Housing in the Last Year: No     Number of Times Moved in the Last Year: 1     Homeless in the Last Year: No

## 2025-07-28 DIAGNOSIS — J45.909 ASTHMA, UNSPECIFIED ASTHMA SEVERITY, UNSPECIFIED WHETHER COMPLICATED, UNSPECIFIED WHETHER PERSISTENT: ICD-10-CM

## 2025-07-28 RX ORDER — IPRATROPIUM BROMIDE AND ALBUTEROL SULFATE 2.5; .5 MG/3ML; MG/3ML
SOLUTION RESPIRATORY (INHALATION) 4 TIMES DAILY
Qty: 180 ML | Refills: 5 | Status: SHIPPED | OUTPATIENT
Start: 2025-07-28

## 2025-08-09 DIAGNOSIS — I10 PRIMARY HYPERTENSION: ICD-10-CM

## 2025-08-11 RX ORDER — LOSARTAN POTASSIUM 50 MG/1
50 TABLET ORAL DAILY
Qty: 90 TABLET | Refills: 1 | Status: SHIPPED | OUTPATIENT
Start: 2025-08-11

## 2025-08-12 DIAGNOSIS — J45.50 SEVERE PERSISTENT ASTHMA WITHOUT COMPLICATION: ICD-10-CM

## 2025-08-12 RX ORDER — BUDESONIDE AND FORMOTEROL FUMARATE 160; 4.5 UG/1; UG/1
2 AEROSOL, METERED RESPIRATORY (INHALATION) 2 TIMES DAILY
Qty: 10.2 G | Refills: 5 | Status: SHIPPED | OUTPATIENT
Start: 2025-08-12

## 2025-08-21 DIAGNOSIS — Z13.29 SCREENING FOR THYROID DISORDER: ICD-10-CM

## 2025-08-21 DIAGNOSIS — E11.65 TYPE 2 DIABETES MELLITUS WITH HYPERGLYCEMIA, WITHOUT LONG-TERM CURRENT USE OF INSULIN: ICD-10-CM

## 2025-08-21 DIAGNOSIS — Z11.59 NEED FOR HEPATITIS C SCREENING TEST: ICD-10-CM

## 2025-08-21 DIAGNOSIS — Z13.220 SCREENING CHOLESTEROL LEVEL: ICD-10-CM

## 2025-08-21 DIAGNOSIS — I10 PRIMARY HYPERTENSION: Chronic | ICD-10-CM

## 2025-08-21 DIAGNOSIS — Z00.00 WELLNESS EXAMINATION: Primary | ICD-10-CM

## 2025-08-21 DIAGNOSIS — Z11.4 SCREENING FOR HIV (HUMAN IMMUNODEFICIENCY VIRUS): ICD-10-CM

## 2025-08-22 ENCOUNTER — OFFICE VISIT (OUTPATIENT)
Dept: OTOLARYNGOLOGY | Facility: CLINIC | Age: 45
End: 2025-08-22
Payer: MEDICAID

## 2025-08-22 DIAGNOSIS — J30.9 ALLERGIC RHINITIS, UNSPECIFIED SEASONALITY, UNSPECIFIED TRIGGER: ICD-10-CM

## 2025-08-22 DIAGNOSIS — J32.9 CHRONIC SINUSITIS, UNSPECIFIED LOCATION: Primary | ICD-10-CM

## 2025-08-22 DIAGNOSIS — R05.3 CHRONIC COUGH: ICD-10-CM

## 2025-08-22 RX ORDER — DOXYCYCLINE 100 MG/1
100 CAPSULE ORAL 2 TIMES DAILY
Qty: 28 CAPSULE | Refills: 0 | Status: SHIPPED | OUTPATIENT
Start: 2025-08-22 | End: 2025-09-05

## 2025-08-31 DIAGNOSIS — G62.9 NEUROPATHY: ICD-10-CM

## 2025-08-31 RX ORDER — GABAPENTIN 300 MG/1
300 CAPSULE ORAL 3 TIMES DAILY
Qty: 270 CAPSULE | Refills: 1 | Status: SHIPPED | OUTPATIENT
Start: 2025-08-31 | End: 2026-02-27

## 2025-09-02 ENCOUNTER — HOSPITAL ENCOUNTER (OUTPATIENT)
Dept: RADIOLOGY | Facility: HOSPITAL | Age: 45
Discharge: HOME | End: 2025-09-02
Attending: NURSE PRACTITIONER
Payer: MEDICAID

## 2025-09-02 DIAGNOSIS — J32.9 CHRONIC SINUSITIS, UNSPECIFIED LOCATION: ICD-10-CM

## 2025-09-02 PROCEDURE — 70486 CT MAXILLOFACIAL W/O DYE: CPT | Mod: TC
